# Patient Record
Sex: FEMALE | Race: WHITE | NOT HISPANIC OR LATINO
[De-identification: names, ages, dates, MRNs, and addresses within clinical notes are randomized per-mention and may not be internally consistent; named-entity substitution may affect disease eponyms.]

---

## 2018-06-21 ENCOUNTER — CHARTING TRANS (OUTPATIENT)
Dept: OTHER | Age: 48
End: 2018-06-21

## 2018-11-01 VITALS
HEIGHT: 69 IN | BODY MASS INDEX: 23.7 KG/M2 | OXYGEN SATURATION: 95 % | TEMPERATURE: 98.9 F | DIASTOLIC BLOOD PRESSURE: 80 MMHG | SYSTOLIC BLOOD PRESSURE: 124 MMHG | WEIGHT: 159.99 LBS | RESPIRATION RATE: 16 BRPM | HEART RATE: 83 BPM

## 2019-07-31 ENCOUNTER — OFFICE VISIT (OUTPATIENT)
Dept: FAMILY MEDICINE CLINIC | Facility: CLINIC | Age: 49
End: 2019-07-31
Payer: COMMERCIAL

## 2019-07-31 ENCOUNTER — TELEPHONE (OUTPATIENT)
Dept: FAMILY MEDICINE CLINIC | Facility: CLINIC | Age: 49
End: 2019-07-31

## 2019-07-31 VITALS
HEART RATE: 74 BPM | TEMPERATURE: 98 F | DIASTOLIC BLOOD PRESSURE: 90 MMHG | BODY MASS INDEX: 29.95 KG/M2 | HEIGHT: 67 IN | SYSTOLIC BLOOD PRESSURE: 146 MMHG | WEIGHT: 190.81 LBS

## 2019-07-31 DIAGNOSIS — F10.20 ALCOHOLISM (HCC): ICD-10-CM

## 2019-07-31 DIAGNOSIS — M79.641 RIGHT HAND PAIN: ICD-10-CM

## 2019-07-31 DIAGNOSIS — F32.A DEPRESSION, UNSPECIFIED DEPRESSION TYPE: ICD-10-CM

## 2019-07-31 DIAGNOSIS — G56.03 BILATERAL CARPAL TUNNEL SYNDROME: ICD-10-CM

## 2019-07-31 DIAGNOSIS — I10 ESSENTIAL HYPERTENSION: Primary | ICD-10-CM

## 2019-07-31 DIAGNOSIS — M65.332 TRIGGER MIDDLE FINGER OF LEFT HAND: ICD-10-CM

## 2019-07-31 PROCEDURE — 99203 OFFICE O/P NEW LOW 30 MIN: CPT | Performed by: FAMILY MEDICINE

## 2019-07-31 RX ORDER — METOPROLOL SUCCINATE 50 MG/1
50 TABLET, EXTENDED RELEASE ORAL DAILY
COMMUNITY
End: 2019-07-31

## 2019-07-31 RX ORDER — ALPRAZOLAM 0.5 MG/1
1 TABLET ORAL 3 TIMES DAILY PRN
Refills: 0 | COMMUNITY
Start: 2019-04-25 | End: 2019-07-31

## 2019-07-31 RX ORDER — ALPRAZOLAM 0.5 MG/1
0.5 TABLET ORAL 3 TIMES DAILY PRN
Qty: 30 TABLET | Refills: 0 | Status: SHIPPED
Start: 2019-07-31 | End: 2019-10-01

## 2019-07-31 RX ORDER — HYDROCHLOROTHIAZIDE 25 MG/1
25 TABLET ORAL DAILY
Qty: 90 TABLET | Refills: 0 | Status: SHIPPED | OUTPATIENT
Start: 2019-07-31 | End: 2019-10-10 | Stop reason: ALTCHOICE

## 2019-07-31 RX ORDER — ESCITALOPRAM OXALATE 10 MG/1
10 TABLET ORAL DAILY
Qty: 90 TABLET | Refills: 0 | Status: SHIPPED | OUTPATIENT
Start: 2019-07-31 | End: 2019-12-02

## 2019-07-31 RX ORDER — BUPROPION HYDROCHLORIDE 150 MG/1
150 TABLET ORAL DAILY
COMMUNITY
End: 2019-07-31

## 2019-07-31 RX ORDER — METOPROLOL SUCCINATE 50 MG/1
50 TABLET, EXTENDED RELEASE ORAL DAILY
Qty: 90 TABLET | Refills: 0 | Status: SHIPPED | OUTPATIENT
Start: 2019-07-31 | End: 2019-11-05

## 2019-07-31 RX ORDER — ESCITALOPRAM OXALATE 10 MG/1
10 TABLET ORAL DAILY
Refills: 0 | COMMUNITY
Start: 2019-03-22 | End: 2019-07-31

## 2019-07-31 RX ORDER — BUPROPION HYDROCHLORIDE 150 MG/1
150 TABLET ORAL DAILY
Qty: 90 TABLET | Refills: 0 | Status: SHIPPED | OUTPATIENT
Start: 2019-07-31 | End: 2019-11-07

## 2019-07-31 RX ORDER — HYDROCHLOROTHIAZIDE 25 MG/1
25 TABLET ORAL DAILY
COMMUNITY
End: 2019-07-31

## 2019-07-31 NOTE — PROGRESS NOTES
HPI: Melissa Grewal is a 46year old female who presents for establishment of care.  with 2 daughters. Due for physical. Got laid off. Diagnosed with HTN. Takes Metoprolol and HCTZ. She has been out of medication.      Followed with Orthopedics for trig medication. Refills given. Depression, unspecified depression type    Refills given. Advised to decrease alcohol use as it may interact with the medication. Trigger middle finger of left hand    Refer to Hand orthopedics.      Bilateral carpal mir

## 2019-10-01 NOTE — TELEPHONE ENCOUNTER
Review pended refill request as it does not fall under a protocol.     Last Rx: 07/31/2019 30  LOV: 07/31/2019

## 2019-10-02 RX ORDER — ALPRAZOLAM 0.5 MG/1
TABLET ORAL
Qty: 30 TABLET | Refills: 1 | Status: SHIPPED | OUTPATIENT
Start: 2019-10-02 | End: 2020-03-04

## 2019-10-10 ENCOUNTER — OFFICE VISIT (OUTPATIENT)
Dept: FAMILY MEDICINE CLINIC | Facility: CLINIC | Age: 49
End: 2019-10-10
Payer: COMMERCIAL

## 2019-10-10 ENCOUNTER — APPOINTMENT (OUTPATIENT)
Dept: LAB | Age: 49
End: 2019-10-10
Attending: FAMILY MEDICINE
Payer: COMMERCIAL

## 2019-10-10 VITALS
SYSTOLIC BLOOD PRESSURE: 100 MMHG | TEMPERATURE: 98 F | DIASTOLIC BLOOD PRESSURE: 66 MMHG | HEART RATE: 62 BPM | HEIGHT: 67.64 IN | BODY MASS INDEX: 28 KG/M2 | RESPIRATION RATE: 16 BRPM | WEIGHT: 182.63 LBS

## 2019-10-10 DIAGNOSIS — I10 ESSENTIAL HYPERTENSION: ICD-10-CM

## 2019-10-10 DIAGNOSIS — Z00.00 ROUTINE PHYSICAL EXAMINATION: ICD-10-CM

## 2019-10-10 DIAGNOSIS — Z12.39 SCREENING FOR BREAST CANCER: ICD-10-CM

## 2019-10-10 DIAGNOSIS — F10.21 ALCOHOL DEPENDENCE IN REMISSION (HCC): ICD-10-CM

## 2019-10-10 DIAGNOSIS — Z00.00 ROUTINE PHYSICAL EXAMINATION: Primary | ICD-10-CM

## 2019-10-10 PROCEDURE — 80053 COMPREHEN METABOLIC PANEL: CPT

## 2019-10-10 PROCEDURE — 90686 IIV4 VACC NO PRSV 0.5 ML IM: CPT | Performed by: FAMILY MEDICINE

## 2019-10-10 PROCEDURE — 82607 VITAMIN B-12: CPT

## 2019-10-10 PROCEDURE — 82746 ASSAY OF FOLIC ACID SERUM: CPT

## 2019-10-10 PROCEDURE — 80061 LIPID PANEL: CPT

## 2019-10-10 PROCEDURE — 84425 ASSAY OF VITAMIN B-1: CPT

## 2019-10-10 PROCEDURE — 84443 ASSAY THYROID STIM HORMONE: CPT

## 2019-10-10 PROCEDURE — 90471 IMMUNIZATION ADMIN: CPT | Performed by: FAMILY MEDICINE

## 2019-10-10 PROCEDURE — 82306 VITAMIN D 25 HYDROXY: CPT

## 2019-10-10 PROCEDURE — 36415 COLL VENOUS BLD VENIPUNCTURE: CPT

## 2019-10-10 PROCEDURE — 85027 COMPLETE CBC AUTOMATED: CPT

## 2019-10-10 PROCEDURE — 99396 PREV VISIT EST AGE 40-64: CPT | Performed by: FAMILY MEDICINE

## 2019-11-06 RX ORDER — METOPROLOL SUCCINATE 50 MG/1
TABLET, EXTENDED RELEASE ORAL
Qty: 90 TABLET | Refills: 1 | Status: SHIPPED | OUTPATIENT
Start: 2019-11-06 | End: 2020-08-03

## 2019-11-07 NOTE — TELEPHONE ENCOUNTER
Refill passed per [facility], [facility] protocol.   Hypertensive Medications  Protocol Criteria:  · Appointment scheduled in the past 6 months or in the next 3 months  · BMP or CMP in the past 12 months  · Creatinine result < 2  Recent Outpatient Visits

## 2019-11-08 RX ORDER — BUPROPION HYDROCHLORIDE 150 MG/1
150 TABLET ORAL DAILY
Qty: 90 TABLET | Refills: 0 | OUTPATIENT
Start: 2019-11-08

## 2019-11-08 RX ORDER — BUPROPION HYDROCHLORIDE 150 MG/1
TABLET ORAL
Qty: 90 TABLET | Refills: 1 | Status: SHIPPED | OUTPATIENT
Start: 2019-11-08 | End: 2020-04-13

## 2019-11-08 NOTE — TELEPHONE ENCOUNTER
Refill passed per [facility], [facility] protocol.   Refill Protocol Appointment Criteria  · Appointment scheduled in the past 6 months or in the next 3 months  Recent Outpatient Visits            4 weeks ago Routine physical examination    [facility]

## 2019-12-03 RX ORDER — ESCITALOPRAM OXALATE 10 MG/1
TABLET ORAL
Qty: 90 TABLET | Refills: 1 | Status: SHIPPED | OUTPATIENT
Start: 2019-12-03 | End: 2020-08-03

## 2019-12-04 RX ORDER — ESCITALOPRAM OXALATE 10 MG/1
10 TABLET ORAL DAILY
Qty: 90 TABLET | Refills: 0 | OUTPATIENT
Start: 2019-12-04

## 2019-12-04 NOTE — TELEPHONE ENCOUNTER
Sent to [facility] out-of-office    Review pended refill request as it does not fall under a protocol.     Last Rx: 7/31/19 #30#0  LOV: 10/10/19

## 2019-12-19 ENCOUNTER — OFFICE VISIT (OUTPATIENT)
Dept: FAMILY MEDICINE CLINIC | Facility: CLINIC | Age: 49
End: 2019-12-19
Payer: COMMERCIAL

## 2019-12-19 VITALS
HEART RATE: 56 BPM | DIASTOLIC BLOOD PRESSURE: 74 MMHG | TEMPERATURE: 98 F | BODY MASS INDEX: 29 KG/M2 | WEIGHT: 186 LBS | SYSTOLIC BLOOD PRESSURE: 111 MMHG | RESPIRATION RATE: 16 BRPM

## 2019-12-19 DIAGNOSIS — I10 ESSENTIAL HYPERTENSION: Primary | ICD-10-CM

## 2019-12-19 PROCEDURE — 99213 OFFICE O/P EST LOW 20 MIN: CPT | Performed by: FAMILY MEDICINE

## 2019-12-19 NOTE — PROGRESS NOTES
HPI: Jeri Bacon is a 44year old female who presents for follow-up. She is still getting some dizzy spells. Checking BP at home and readings have been low- systolic under 445. Eliminated HCTZ at last visit. Denies chest pain, SOB, palpitations, edema.

## 2020-03-04 RX ORDER — ALPRAZOLAM 0.5 MG/1
TABLET ORAL
Qty: 30 TABLET | Refills: 0 | Status: SHIPPED | OUTPATIENT
Start: 2020-03-04 | End: 2020-06-24

## 2020-03-04 NOTE — TELEPHONE ENCOUNTER
· Please advise on refill. Thanks.   Recent Outpatient Visits            2 months ago Essential hypertension    [facility], [facility], [address] , [place], [place]    Office Visit    4 months ago Routine physical examination    [address]

## 2020-04-13 RX ORDER — BUPROPION HYDROCHLORIDE 150 MG/1
TABLET ORAL
Qty: 30 TABLET | Refills: 0 | Status: SHIPPED | OUTPATIENT
Start: 2020-04-13 | End: 2020-06-03

## 2020-06-03 RX ORDER — BUPROPION HYDROCHLORIDE 150 MG/1
TABLET ORAL
Qty: 30 TABLET | Refills: 0 | Status: SHIPPED | OUTPATIENT
Start: 2020-06-03 | End: 2020-06-24

## 2020-06-24 RX ORDER — ALPRAZOLAM 0.5 MG/1
TABLET ORAL
Qty: 30 TABLET | Refills: 0 | Status: SHIPPED | OUTPATIENT
Start: 2020-06-24 | End: 2020-08-03

## 2020-06-24 RX ORDER — BUPROPION HYDROCHLORIDE 150 MG/1
TABLET ORAL
Qty: 30 TABLET | Refills: 0 | Status: SHIPPED | OUTPATIENT
Start: 2020-06-24 | End: 2020-08-03

## 2020-08-03 RX ORDER — ESCITALOPRAM OXALATE 10 MG/1
10 TABLET ORAL DAILY
Qty: 90 TABLET | Refills: 1 | Status: CANCELLED | OUTPATIENT
Start: 2020-08-03

## 2020-08-03 NOTE — TELEPHONE ENCOUNTER
Ladonna with [employer] called and advised she is looking for refills for the patient for the Alprazolam & the Bupropion, Patient is also asking about the Escitaloprm refill. They are looking for updates on these medications. Please Advise.

## 2020-08-06 RX ORDER — ALPRAZOLAM 0.5 MG/1
0.5 TABLET ORAL 3 TIMES DAILY PRN
Qty: 30 TABLET | Refills: 0 | Status: SHIPPED | OUTPATIENT
Start: 2020-08-06 | End: 2020-09-17

## 2020-08-06 RX ORDER — BUPROPION HYDROCHLORIDE 150 MG/1
150 TABLET ORAL DAILY
Qty: 30 TABLET | Refills: 0 | Status: SHIPPED | OUTPATIENT
Start: 2020-08-06 | End: 2020-09-17 | Stop reason: ALTCHOICE

## 2020-09-03 RX ORDER — BUPROPION HYDROCHLORIDE 150 MG/1
TABLET ORAL
Qty: 30 TABLET | Refills: 0 | Status: SHIPPED | OUTPATIENT
Start: 2020-09-03 | End: 2020-11-10

## 2020-09-03 RX ORDER — ALPRAZOLAM 0.5 MG/1
TABLET ORAL
Qty: 30 TABLET | Refills: 0 | Status: SHIPPED | OUTPATIENT
Start: 2020-09-03 | End: 2020-09-17 | Stop reason: CLARIF

## 2020-09-17 ENCOUNTER — OFFICE VISIT (OUTPATIENT)
Dept: FAMILY MEDICINE CLINIC | Facility: CLINIC | Age: 50
End: 2020-09-17
Payer: COMMERCIAL

## 2020-09-17 VITALS
TEMPERATURE: 97 F | DIASTOLIC BLOOD PRESSURE: 70 MMHG | BODY MASS INDEX: 28.7 KG/M2 | HEIGHT: 67.64 IN | OXYGEN SATURATION: 97 % | SYSTOLIC BLOOD PRESSURE: 110 MMHG | HEART RATE: 69 BPM | RESPIRATION RATE: 18 BRPM | WEIGHT: 187.19 LBS

## 2020-09-17 DIAGNOSIS — Z13.21 SCREENING FOR ENDOCRINE, NUTRITIONAL, METABOLIC AND IMMUNITY DISORDER: ICD-10-CM

## 2020-09-17 DIAGNOSIS — K63.5 POLYP OF COLON, UNSPECIFIED PART OF COLON, UNSPECIFIED TYPE: ICD-10-CM

## 2020-09-17 DIAGNOSIS — F41.9 ANXIETY: ICD-10-CM

## 2020-09-17 DIAGNOSIS — Z00.00 ANNUAL PHYSICAL EXAM: Primary | ICD-10-CM

## 2020-09-17 DIAGNOSIS — F51.04 PSYCHOPHYSIOLOGICAL INSOMNIA: ICD-10-CM

## 2020-09-17 DIAGNOSIS — Z12.4 SCREENING FOR CERVICAL CANCER: ICD-10-CM

## 2020-09-17 DIAGNOSIS — Z13.0 SCREENING FOR IRON DEFICIENCY ANEMIA: ICD-10-CM

## 2020-09-17 DIAGNOSIS — Z13.29 SCREENING FOR ENDOCRINE, NUTRITIONAL, METABOLIC AND IMMUNITY DISORDER: ICD-10-CM

## 2020-09-17 DIAGNOSIS — Z13.6 SCREENING FOR HEART DISEASE: ICD-10-CM

## 2020-09-17 DIAGNOSIS — Z12.31 ENCOUNTER FOR SCREENING MAMMOGRAM FOR MALIGNANT NEOPLASM OF BREAST: ICD-10-CM

## 2020-09-17 DIAGNOSIS — Z13.0 SCREENING FOR ENDOCRINE, NUTRITIONAL, METABOLIC AND IMMUNITY DISORDER: ICD-10-CM

## 2020-09-17 DIAGNOSIS — Z12.11 SCREEN FOR COLON CANCER: ICD-10-CM

## 2020-09-17 DIAGNOSIS — Z78.0 MENOPAUSE: ICD-10-CM

## 2020-09-17 DIAGNOSIS — Z13.228 SCREENING FOR ENDOCRINE, NUTRITIONAL, METABOLIC AND IMMUNITY DISORDER: ICD-10-CM

## 2020-09-17 DIAGNOSIS — Z23 NEED FOR VACCINATION: ICD-10-CM

## 2020-09-17 PROBLEM — L02.93 RECURRENT BOILS: Status: ACTIVE | Noted: 2018-02-14

## 2020-09-17 PROBLEM — M19.049 HAND ARTHRITIS: Status: ACTIVE | Noted: 2018-07-25

## 2020-09-17 PROBLEM — E66.3 OVERWEIGHT WITH BODY MASS INDEX (BMI) 25.0-29.9: Status: ACTIVE | Noted: 2017-02-27

## 2020-09-17 PROBLEM — M67.449 MUCOUS CYST OF FINGER: Status: ACTIVE | Noted: 2018-07-25

## 2020-09-17 PROBLEM — G56.03 BILATERAL CARPAL TUNNEL SYNDROME: Status: ACTIVE | Noted: 2018-07-25

## 2020-09-17 PROBLEM — L02.92 RECURRENT BOILS: Status: ACTIVE | Noted: 2018-02-14

## 2020-09-17 LAB
ALBUMIN SERPL-MCNC: 4.1 G/DL (ref 3.4–5)
ALBUMIN/GLOB SERPL: 1.1 {RATIO} (ref 1–2)
ALP LIVER SERPL-CCNC: 102 U/L (ref 41–108)
ALT SERPL-CCNC: 34 U/L (ref 13–56)
ANION GAP SERPL CALC-SCNC: 4 MMOL/L (ref 0–18)
AST SERPL-CCNC: 19 U/L (ref 15–37)
BASOPHILS # BLD AUTO: 0.04 X10(3) UL (ref 0–0.2)
BASOPHILS NFR BLD AUTO: 0.5 %
BILIRUB SERPL-MCNC: 0.4 MG/DL (ref 0.1–2)
BUN BLD-MCNC: 16 MG/DL (ref 7–18)
BUN/CREAT SERPL: 23.9 (ref 10–20)
CALCIUM BLD-MCNC: 10 MG/DL (ref 8.5–10.1)
CHLORIDE SERPL-SCNC: 107 MMOL/L (ref 98–112)
CHOLEST SMN-MCNC: 275 MG/DL (ref ?–200)
CO2 SERPL-SCNC: 27 MMOL/L (ref 21–32)
CREAT BLD-MCNC: 0.67 MG/DL (ref 0.55–1.02)
DEPRECATED RDW RBC AUTO: 42.5 FL (ref 35.1–46.3)
EOSINOPHIL # BLD AUTO: 0.09 X10(3) UL (ref 0–0.7)
EOSINOPHIL NFR BLD AUTO: 1 %
ERYTHROCYTE [DISTWIDTH] IN BLOOD BY AUTOMATED COUNT: 12.3 % (ref 11–15)
GLOBULIN PLAS-MCNC: 3.7 G/DL (ref 2.8–4.4)
GLUCOSE BLD-MCNC: 95 MG/DL (ref 70–99)
HCT VFR BLD AUTO: 45.1 % (ref 35–48)
HDLC SERPL-MCNC: 53 MG/DL (ref 40–59)
HGB BLD-MCNC: 14.2 G/DL (ref 12–16)
IMM GRANULOCYTES # BLD AUTO: 0.04 X10(3) UL (ref 0–1)
IMM GRANULOCYTES NFR BLD: 0.5 %
LDLC SERPL CALC-MCNC: 204 MG/DL (ref ?–100)
LYMPHOCYTES # BLD AUTO: 2.7 X10(3) UL (ref 1–4)
LYMPHOCYTES NFR BLD AUTO: 31.1 %
M PROTEIN MFR SERPL ELPH: 7.8 G/DL (ref 6.4–8.2)
MCH RBC QN AUTO: 29.2 PG (ref 26–34)
MCHC RBC AUTO-ENTMCNC: 31.5 G/DL (ref 31–37)
MCV RBC AUTO: 92.8 FL (ref 80–100)
MONOCYTES # BLD AUTO: 0.62 X10(3) UL (ref 0.1–1)
MONOCYTES NFR BLD AUTO: 7.2 %
NEUTROPHILS # BLD AUTO: 5.18 X10 (3) UL (ref 1.5–7.7)
NEUTROPHILS # BLD AUTO: 5.18 X10(3) UL (ref 1.5–7.7)
NEUTROPHILS NFR BLD AUTO: 59.7 %
NONHDLC SERPL-MCNC: 222 MG/DL (ref ?–130)
OSMOLALITY SERPL CALC.SUM OF ELEC: 287 MOSM/KG (ref 275–295)
PATIENT FASTING Y/N/NP: YES
PATIENT FASTING Y/N/NP: YES
PLATELET # BLD AUTO: 260 10(3)UL (ref 150–450)
POTASSIUM SERPL-SCNC: 4.7 MMOL/L (ref 3.5–5.1)
RBC # BLD AUTO: 4.86 X10(6)UL (ref 3.8–5.3)
SODIUM SERPL-SCNC: 138 MMOL/L (ref 136–145)
TRIGL SERPL-MCNC: 88 MG/DL (ref 30–149)
TSI SER-ACNC: 2.73 MIU/ML (ref 0.36–3.74)
VLDLC SERPL CALC-MCNC: 18 MG/DL (ref 0–30)
WBC # BLD AUTO: 8.7 X10(3) UL (ref 4–11)

## 2020-09-17 PROCEDURE — 3074F SYST BP LT 130 MM HG: CPT | Performed by: FAMILY MEDICINE

## 2020-09-17 PROCEDURE — 80050 GENERAL HEALTH PANEL: CPT | Performed by: FAMILY MEDICINE

## 2020-09-17 PROCEDURE — 87624 HPV HI-RISK TYP POOLED RSLT: CPT | Performed by: FAMILY MEDICINE

## 2020-09-17 PROCEDURE — 99386 PREV VISIT NEW AGE 40-64: CPT | Performed by: FAMILY MEDICINE

## 2020-09-17 PROCEDURE — 88175 CYTOPATH C/V AUTO FLUID REDO: CPT | Performed by: FAMILY MEDICINE

## 2020-09-17 PROCEDURE — 90686 IIV4 VACC NO PRSV 0.5 ML IM: CPT | Performed by: FAMILY MEDICINE

## 2020-09-17 PROCEDURE — 90732 PPSV23 VACC 2 YRS+ SUBQ/IM: CPT | Performed by: FAMILY MEDICINE

## 2020-09-17 PROCEDURE — 90471 IMMUNIZATION ADMIN: CPT | Performed by: FAMILY MEDICINE

## 2020-09-17 PROCEDURE — 36415 COLL VENOUS BLD VENIPUNCTURE: CPT | Performed by: FAMILY MEDICINE

## 2020-09-17 PROCEDURE — 80061 LIPID PANEL: CPT | Performed by: FAMILY MEDICINE

## 2020-09-17 PROCEDURE — 3008F BODY MASS INDEX DOCD: CPT | Performed by: FAMILY MEDICINE

## 2020-09-17 PROCEDURE — 3078F DIAST BP <80 MM HG: CPT | Performed by: FAMILY MEDICINE

## 2020-09-17 RX ORDER — TRAZODONE HYDROCHLORIDE 50 MG/1
50 TABLET ORAL NIGHTLY
Qty: 30 TABLET | Refills: 1 | Status: SHIPPED | OUTPATIENT
Start: 2020-09-17 | End: 2020-09-22 | Stop reason: SINTOL

## 2020-09-17 NOTE — PATIENT INSTRUCTIONS
Perform labs fasting 8 hours with water or black coffee or or black tea diet  soda only prior to exam.    -Encourage healthy diet of whole food and avoid processed food and sugary drinks and sodas.   Diet should include lean meats and vegetables including 5 Screening Who needs it How often   Type 2 diabetes or prediabetes All women beginning at age 39 and women without symptoms at any age who are overweight or obese and have 1 or more additional risk factors for diabetes.  At  least every 3 years   Type 2 diab Some people should be screened using a different schedule because of their personal or family health history. Talk with your healthcare provider about your health history.    Depression All women in this age group At routine exams   Gonorrhea Sexually activ Measles, mumps, rubella (MMR) Women in this age group through their late 46s who have no record of these infections or vaccines 1 dose   Meningococcal Women at increased risk for infection – talk with your healthcare provider 1 or more doses   Pneumococcal Colorectal Cancer Screening    Colorectal cancer is cancer in the colon or rectum. It's a leading cause of cancer deaths in the [place] But when this cancer is found and removed early, the chances of a full recovery are very good.  Because colorectal cancer ra Polyps are growths that form on the inner lining of the colon or rectum. Most are benign, which means they aren’t cancer. But over time, some polyps can become cancer (malignant). This happens when cells in these polyps start growing abnormally.  In time, milton If you have a family history of colon cancer or are at high risk for other reasons, you may need to have screening even earlier. Talk with your provider to find out about your risk factors. Screening advice varies among expert groups.  It's important to al Colonoscopy is the only screening test that lets your healthcare provider see the entire colon and rectum. This test also lets your healthcare provider remove any pieces of tissue that need to be looked at by a lab.  If something suspicious is found using a Talk with your healthcare provider about which tests might be right for you. No matter which test you choose, the most important thing is that you get screened.  Keep in mind that if cancer is found at an early stage during screening, treatment is more like Perimenopause is sometimes called the menopause transition. It happens in the months or years before menopause. It may begin when you reach your mid-40s. During this time, your estrogen levels go up and down and then decrease.  As a result, you may notice s © 6465-0399 [employer] [address]. [address] [facility], [address] [place]. All rights reserved. This information is not intended as a substitute for professional medical care. Always follow your healthcare professional's instructions.         Hormone A woman goes through many stages during her lifetime. These stages are a natural part of being a woman. Physical and emotional changes take place during the menstrual cycle, pregnancy, motherhood, and menopause.  These changes can affect sleep, even cause i · Many new mothers feel a little down for a few weeks. Share your feelings with your loved ones. Talk to your healthcare provider if your feelings get in the way of sleeping or eating. · Try to adjust your baby’s sleep to fit a day-night cycle.  At night, You may wonder how you can improve the health of your heart. If you’re thinking about exercise, you’re on the right track. You don’t need to become an athlete, but you do need a certain amount of brisk exercise to help strengthen your heart.  If you have be Choose one or more activities you enjoy. Walking is one of the easiest things you can do. You can also try swimming, riding a bike, dancing, or taking an exercise class.   Stop exercising and call your doctor if you:  · Have chest pain or feel dizzy or ligh · Reduce sodium (salt) intake. Eating too much salt may increase your blood pressure. Limit your sodium intake to 2,300 milligrams (mg) per day (the amount in 1 teaspoon of salt), or less if your healthcare provider recommends it.  Dining out less often and Healthy eating starts at the grocery store. Be sure to pay attention to food labels on packaged foods. Look for products that are high in fiber and protein, and low in saturated fat, cholesterol, and sodium. Avoid products that contain trans fat.  And pay c During young adulthood, bones become their strongest. This is called peak bone mass. The same good habits that kept bones healthy in childhood help keep bones healthy in adulthood. Age 27 to menopause  Bone mass declines slightly during these years.  Your Your body needs calcium to build and repair bones. But it can't make calcium on its own. That's why it's important to eat calcium-rich foods. Some foods are naturally rich in calcium. Others have calcium added (fortified).  It's best to get calcium from the 25-hydroxyvitamin D (25-high-DROX-ee-VIE-tuh-min D), 25(OH)D  What is this test?  Vitamin D is mainly found in fortified dairy foods, juice, breakfast cereal, and certain fish. This vitamin plays many roles in the body.  But because it helps the body absorb Test results may vary depending on your age, gender, health history, the method used for the test, and other things. Your test results may not mean you have a problem. Ask your healthcare provider what your test results mean for you.    Children and adults © 3795-8418 [employer] [address]. [address] [facility], [address] [place]. All rights reserved. This information is not intended as a substitute for professional medical care. Always follow your healthcare professional's instructions.           Preve If you have osteoporosis, exercise is vital for your health. It can prevent bone fractures and spine changes. It will slow bone loss. Exercise will strengthen your body. It can also be fun.  A variety of exercises is best. See below for exercises that can h

## 2020-09-17 NOTE — PROGRESS NOTES
REASON FOR VISIT:    Tracy Rosen is a 44year old female who [facility] History of anxiety/depression-worse with perimenopause/menopause in the past 2 years.   On escitalopram 10 mg and bupropion b • MAGNESIUM GLYCINATE OR Take 425 mg by mouth nightly. • traZODone HCl 50 MG Oral Tab Take 1 tablet (50 mg total) by mouth nightly.  30 tablet 1   • BUPROPION HCL ER, XL, 150 MG Oral Tablet 24 Hr TAKE ONE TABLET BY MOUTH ONE TIME DAILY  30 tablet 0   • Chlamydia Screening Screen Annually age<25, if sex active/on OCPs; >24 high risk No results found for: CHLAMYDIA    Colonoscopy Screen Every 10 years Colonoscopy due on 12/04/2018    Flex Sigmoidoscopy Screen  Every 5 years No results found for this or any LDL  Annually LDL Cholesterol (mg/dL)   Date Value   09/17/2020 204 (H)    No flowsheet data found. Dilated Eye exam  Annually No flowsheet data found. No flowsheet data found.     Asthma  (Annually between Nov. 1 & Dec. 31)    Date of last AAP/ACT an GI: denies abdominal pain, denies heartburn  : denies dysuria, vaginal discharge or itching.   menopausal, denies vaginal bleeding  MUSCULOSKELETAL: denies back pain  NEURO: Brain fog denies headaches  PSYCHE: Insomnia, fatigue denies depression or anxiet - TSH W REFLEX TO FREE T4; Future  - THINPREP PAP SMEAR B; Future  - HPV HIGH RISK , THIN PREP COLLECTION; Future  - [facility] LUAN 2D+3D SCREENING BILAT (CPT=77067/38525);  Future  - GASTRO - INTERNAL  - PNEUMOCOCCAL IMM (PNEUMOVAX)  - FLULAVAL INFLUENZA VACCINE Suspect anxiety and depression worsening due to loss of estrogen    5. Screen for colon cancer  6. Polyp of colon, unspecified part of colon, unspecified type  - GASTRO - INTERNAL  Schedule for repeat colonoscopy    7.  Need for vaccination  - PNEUMOCOCCAL

## 2020-09-17 NOTE — PROGRESS NOTES
Patient came in for draw of ordered fasting labs. Patient drawn out of left  AC, x 1 attempt and tolerated well. 1 lt grn 1 lav tube drawn.

## 2020-09-18 ENCOUNTER — PATIENT MESSAGE (OUTPATIENT)
Dept: FAMILY MEDICINE CLINIC | Facility: CLINIC | Age: 50
End: 2020-09-18

## 2020-09-18 ENCOUNTER — TELEPHONE (OUTPATIENT)
Dept: FAMILY MEDICINE CLINIC | Facility: CLINIC | Age: 50
End: 2020-09-18

## 2020-09-18 LAB — HPV I/H RISK 1 DNA SPEC QL NAA+PROBE: NEGATIVE

## 2020-09-18 NOTE — TELEPHONE ENCOUNTER
Rec'd flu shot ( left arm ) and pneumonia( left arm) shot 9/17/2020  New medication trazodone started 9/17/2020    Left arm and red and swollen and sensitive to touch yesterday  Today it is just is sore and a little red and warm and sensitive to touch

## 2020-09-18 NOTE — TELEPHONE ENCOUNTER
Patient called and said that she is having a reaction to the shots that she got yesterday or the medication is not sure.

## 2020-09-18 NOTE — TELEPHONE ENCOUNTER
Pneumonia vaccine-about 10% of patients can get of reaction at the injection site and typically its its redness and itching which resolves within a few days.   As long as there is no pus or drainage or the entire arm is not red just an area of a few centime

## 2020-09-18 NOTE — TELEPHONE ENCOUNTER
Patient states dizzy, nauseas and mild headache, body aches, but denies any symptoms of anaphylaxis. Agrees to go to emergency room if she is short of breath, feels like passing out or having chest pain, severe headache, wheezing or feeling worse today.

## 2020-09-21 NOTE — TELEPHONE ENCOUNTER
From: Dina Farrar  To: Nadia Beard DO  Sent: 9/18/2020 2:16 PM CDT  Subject: Visit Follow-up Question    Jarrett Varghese. I seem to be having a reaction to the injections yesterday.  Dizziness, nauseous, low fever, cold sweats/chills, body aches, the

## 2020-09-22 PROBLEM — F99 INSOMNIA DUE TO OTHER MENTAL DISORDER: Status: ACTIVE | Noted: 2020-09-22

## 2020-09-22 PROBLEM — F51.05 INSOMNIA DUE TO OTHER MENTAL DISORDER: Status: ACTIVE | Noted: 2020-09-22

## 2020-09-22 PROBLEM — R22.41 HIP MASS, RIGHT: Status: ACTIVE | Noted: 2020-09-22

## 2020-09-22 NOTE — PROGRESS NOTES
Due to COVID-19 ACTION PLAN, the patient's office visit was converted to a phone or video visit.   Time Spent: 31 min    Subjective     HPI:   Alexandre Alcantar is a 44year old female who presents for follow up side effect to medication- 30 minutes of • Sodium 09/17/2020 138    • Potassium 09/17/2020 4.7    • Chloride 09/17/2020 107    • CO2 09/17/2020 27.0    • Anion Gap 09/17/2020 4    • BUN 09/17/2020 16    • Creatinine 09/17/2020 0.67    • BUN/CREA Ratio 09/17/2020 23.9*   • Calcium, Total 09/17/2 tablet (20 mg total) by mouth daily.  -Risks and benefits of medication discussed including headache, bloating, constipation, diarrhea, weight gain, developing depression, severe depression, dizziness, not feeling like oneself.   Black box warning of becomi crisis/national emergency and because of restrictions of visitation. There are limitations of this visit as no physical exam could be performed. Every conscious effort was taken to allow for sufficient and adequate time.   This billing visit was spent on

## 2020-09-25 NOTE — PROGRESS NOTES
Called and spoke with pt. Informed her of lab and pap results. She states she is due for an appt in 1 month from her last visit and will address cholesterol medication at that visit.

## 2020-10-02 ENCOUNTER — TELEPHONE (OUTPATIENT)
Dept: FAMILY MEDICINE CLINIC | Facility: CLINIC | Age: 50
End: 2020-10-02

## 2020-10-02 ENCOUNTER — OFFICE VISIT (OUTPATIENT)
Dept: FAMILY MEDICINE CLINIC | Facility: CLINIC | Age: 50
End: 2020-10-02
Payer: COMMERCIAL

## 2020-10-02 VITALS
SYSTOLIC BLOOD PRESSURE: 104 MMHG | TEMPERATURE: 97 F | OXYGEN SATURATION: 98 % | HEIGHT: 67.7 IN | WEIGHT: 187.38 LBS | DIASTOLIC BLOOD PRESSURE: 66 MMHG | BODY MASS INDEX: 28.73 KG/M2 | RESPIRATION RATE: 18 BRPM | HEART RATE: 68 BPM

## 2020-10-02 DIAGNOSIS — F51.05 INSOMNIA DUE TO OTHER MENTAL DISORDER: ICD-10-CM

## 2020-10-02 DIAGNOSIS — R06.83 SNORING: ICD-10-CM

## 2020-10-02 DIAGNOSIS — R25.8 NOCTURNAL LEG MOVEMENTS: ICD-10-CM

## 2020-10-02 DIAGNOSIS — D17.1 LIPOMA OF TORSO: ICD-10-CM

## 2020-10-02 DIAGNOSIS — G47.00 FREQUENT NOCTURNAL AWAKENING: Primary | ICD-10-CM

## 2020-10-02 DIAGNOSIS — F51.04 PSYCHOPHYSIOLOGICAL INSOMNIA: ICD-10-CM

## 2020-10-02 DIAGNOSIS — F99 INSOMNIA DUE TO OTHER MENTAL DISORDER: ICD-10-CM

## 2020-10-02 PROCEDURE — 3074F SYST BP LT 130 MM HG: CPT | Performed by: FAMILY MEDICINE

## 2020-10-02 PROCEDURE — 3078F DIAST BP <80 MM HG: CPT | Performed by: FAMILY MEDICINE

## 2020-10-02 PROCEDURE — 99214 OFFICE O/P EST MOD 30 MIN: CPT | Performed by: FAMILY MEDICINE

## 2020-10-02 PROCEDURE — 3008F BODY MASS INDEX DOCD: CPT | Performed by: FAMILY MEDICINE

## 2020-10-02 RX ORDER — GABAPENTIN 300 MG/1
300 CAPSULE ORAL NIGHTLY
Qty: 30 CAPSULE | Refills: 1 | Status: SHIPPED | OUTPATIENT
Start: 2020-10-02 | End: 2020-11-30

## 2020-10-02 NOTE — PATIENT INSTRUCTIONS
As of October 6th 2014, the Drug Enforcement Agency [facility]) is reclassifying all hydrocodone combination medications from Schedule III to Schedule II. This includes medications such as Norco, Vicodin, Lortab, Zohydro, and Vicoprofen.      What this means for prompts. [phone]  Please call radiology in the next couple of days and an order for ultrasound of soft tissue on the abdomen will be placed.

## 2020-10-02 NOTE — TELEPHONE ENCOUNTER
Patient called stating she tried to make an appt with the sleep study dr, but the staff at that office said the referral was put in incorrectly. She said they told her to have us put the referral in under \"pulmonary\" instead of \"sleep study. \"

## 2020-10-02 NOTE — PROGRESS NOTES
CHIEF COMPLAINT: Patient presents with:  Lump: Rt hip, increasing in size, Denies any pain     HPI:     Hawkinsedilson Shipley is a 44year old female presents for right hip mass -  noticed 9/19/20- non tender, movable-feels it has become larger sinc Yes      Frequency: 4 or more times a week      Drinks per session: 5 or 6      Binge frequency: Less than monthly    Drug use: Never       Medications (Active prior to today's visit):  Current Outpatient Medications   Medication Sig Dispense Refill   • es bilaterally. Skin: no acute rashes, right lower abdomen upper hip 7 cm movable soft well-circumscribed mass, skin is pink and normal color no redness with palpation. Neuro: AOx3.   Normal gait  PSYCHE:mild fatigue, fatigued otherwise normal affect, very side effects or complications from the treatments as a result of today.      Problem List:   Patient Active Problem List:     Generalized anxiety disorder     Bilateral carpal tunnel syndrome     Essential hypertension, benign     Hand arthritis     Health

## 2020-10-04 NOTE — TELEPHONE ENCOUNTER
Patient is to complete the sleep study if it is positive she may need to see the sleep  that would be determined based on the results. Please direct her to call central scheduling at [phone] to schedule sleep study.   Thank you this w

## 2020-10-05 NOTE — TELEPHONE ENCOUNTER
LMOM to return call to the office. Provided pt office phone [phone] along with office hours given.     Detailed message left for patient to call  to schedule sleep study first and then if needed she would be referred to Pulmonology

## 2020-10-15 ENCOUNTER — OFFICE VISIT (OUTPATIENT)
Dept: SURGERY | Facility: CLINIC | Age: 50
End: 2020-10-15
Payer: COMMERCIAL

## 2020-10-15 VITALS — BODY MASS INDEX: 29.35 KG/M2 | TEMPERATURE: 97 F | HEIGHT: 67 IN | WEIGHT: 187 LBS

## 2020-10-15 DIAGNOSIS — D17.79 LIPOMA OF OTHER SPECIFIED SITES: Primary | ICD-10-CM

## 2020-10-15 PROCEDURE — 3008F BODY MASS INDEX DOCD: CPT | Performed by: SURGERY

## 2020-10-15 PROCEDURE — 99202 OFFICE O/P NEW SF 15 MIN: CPT | Performed by: SURGERY

## 2020-10-15 RX ORDER — ACETAMINOPHEN 500 MG
1000 TABLET ORAL ONCE
Status: CANCELLED | OUTPATIENT
Start: 2020-10-15 | End: 2020-10-15

## 2020-10-15 RX ORDER — CYANOCOBALAMIN (VITAMIN B-12) 5000 MCG
1 TABLET,DISINTEGRATING ORAL DAILY
COMMUNITY
End: 2021-11-29

## 2020-10-15 RX ORDER — IBUPROFEN 200 MG
200 TABLET ORAL EVERY 6 HOURS PRN
COMMUNITY
End: 2021-12-02

## 2020-10-15 RX ORDER — ACETAMINOPHEN 500 MG
1000 TABLET ORAL EVERY 6 HOURS PRN
COMMUNITY
End: 2021-02-01 | Stop reason: ALTCHOICE

## 2020-10-15 RX ORDER — CHOLECALCIFEROL (VITAMIN D3) 125 MCG
1 CAPSULE ORAL DAILY
COMMUNITY
End: 2021-11-29

## 2020-10-16 ENCOUNTER — APPOINTMENT (OUTPATIENT)
Dept: LAB | Facility: HOSPITAL | Age: 50
End: 2020-10-16
Attending: SURGERY
Payer: COMMERCIAL

## 2020-10-16 DIAGNOSIS — D17.9 LIPOMA: ICD-10-CM

## 2020-10-16 NOTE — H&P (VIEW-ONLY)
New Patient Visit Note       Active Problems      1. Lipoma of other specified sites        Chief Complaint   Patient presents with:  Lump: lipoma right hip consult -- States discomfort. States notices more when laying down for a long period of time.  Aj Barron •  Cyanocobalamin ([employer] VITAMIN B-12) 5000 MCG Sublingual SL Tab, Place 1 tablet under the tongue daily. , Disp: , Rfl:   •  Lactobacillus (PROBIOTIC ACIDOPHILUS OR), Take 1 tablet by mouth daily. , Disp: , Rfl:   •  ibuprofen 200 MG Oral Tab, Take 200 mg by Abdominal: Soft. Bowel sounds are normal. She exhibits no distension. There is no abdominal tenderness. Musculoskeletal: Normal range of motion. General: No deformity. Neurological: She is alert and oriented to person, place, and time.    Skin:

## 2020-10-16 NOTE — H&P
New Patient Visit Note       Active Problems      1. Lipoma of other specified sites        Chief Complaint   Patient presents with:  Lump: lipoma right hip consult -- States discomfort. States notices more when laying down for a long period of time.  Louretta Ring •  Cyanocobalamin ([employer] VITAMIN B-12) 5000 MCG Sublingual SL Tab, Place 1 tablet under the tongue daily. , Disp: , Rfl:   •  Lactobacillus (PROBIOTIC ACIDOPHILUS OR), Take 1 tablet by mouth daily. , Disp: , Rfl:   •  ibuprofen 200 MG Oral Tab, Take 200 mg by Abdominal: Soft. Bowel sounds are normal. She exhibits no distension. There is no abdominal tenderness. Musculoskeletal: Normal range of motion. General: No deformity. Neurological: She is alert and oriented to person, place, and time.    Skin:

## 2020-10-19 ENCOUNTER — HOSPITAL ENCOUNTER (OUTPATIENT)
Facility: HOSPITAL | Age: 50
Setting detail: HOSPITAL OUTPATIENT SURGERY
Discharge: HOME | End: 2020-10-19
Attending: SURGERY | Admitting: SURGERY
Payer: COMMERCIAL

## 2020-10-19 ENCOUNTER — ANESTHESIA (OUTPATIENT)
Dept: SURGERY | Facility: HOSPITAL | Age: 50
End: 2020-10-19
Payer: COMMERCIAL

## 2020-10-19 ENCOUNTER — ANESTHESIA EVENT (OUTPATIENT)
Dept: SURGERY | Facility: HOSPITAL | Age: 50
End: 2020-10-19
Payer: COMMERCIAL

## 2020-10-19 VITALS
TEMPERATURE: 97 F | WEIGHT: 187.19 LBS | BODY MASS INDEX: 29.38 KG/M2 | DIASTOLIC BLOOD PRESSURE: 59 MMHG | HEART RATE: 63 BPM | SYSTOLIC BLOOD PRESSURE: 101 MMHG | OXYGEN SATURATION: 97 % | RESPIRATION RATE: 18 BRPM | HEIGHT: 67 IN

## 2020-10-19 DIAGNOSIS — D17.9 LIPOMA: Primary | ICD-10-CM

## 2020-10-19 DIAGNOSIS — D17.79 LIPOMA OF OTHER SPECIFIED SITES: ICD-10-CM

## 2020-10-19 PROCEDURE — 0JBL0ZZ EXCISION OF RIGHT UPPER LEG SUBCUTANEOUS TISSUE AND FASCIA, OPEN APPROACH: ICD-10-PCS | Performed by: SURGERY

## 2020-10-19 PROCEDURE — 88304 TISSUE EXAM BY PATHOLOGIST: CPT | Performed by: SURGERY

## 2020-10-19 PROCEDURE — 81025 URINE PREGNANCY TEST: CPT | Performed by: SURGERY

## 2020-10-19 RX ORDER — LIDOCAINE HYDROCHLORIDE AND EPINEPHRINE 10; 10 MG/ML; UG/ML
INJECTION, SOLUTION INFILTRATION; PERINEURAL AS NEEDED
Status: DISCONTINUED | OUTPATIENT
Start: 2020-10-19 | End: 2020-10-19 | Stop reason: HOSPADM

## 2020-10-19 RX ORDER — ONDANSETRON 2 MG/ML
4 INJECTION INTRAMUSCULAR; INTRAVENOUS AS NEEDED
Status: DISCONTINUED | OUTPATIENT
Start: 2020-10-19 | End: 2020-10-19

## 2020-10-19 RX ORDER — BUPIVACAINE HYDROCHLORIDE 5 MG/ML
INJECTION, SOLUTION EPIDURAL; INTRACAUDAL AS NEEDED
Status: DISCONTINUED | OUTPATIENT
Start: 2020-10-19 | End: 2020-10-19 | Stop reason: HOSPADM

## 2020-10-19 RX ORDER — CEFAZOLIN SODIUM/WATER 2 G/20 ML
2 SYRINGE (ML) INTRAVENOUS ONCE
Status: COMPLETED | OUTPATIENT
Start: 2020-10-19 | End: 2020-10-19

## 2020-10-19 RX ORDER — HEPARIN SODIUM 5000 [USP'U]/ML
5000 INJECTION, SOLUTION INTRAVENOUS; SUBCUTANEOUS ONCE
Status: COMPLETED | OUTPATIENT
Start: 2020-10-19 | End: 2020-10-19

## 2020-10-19 RX ORDER — HYDROCODONE BITARTRATE AND ACETAMINOPHEN 5; 325 MG/1; MG/1
1 TABLET ORAL AS NEEDED
Status: DISCONTINUED | OUTPATIENT
Start: 2020-10-19 | End: 2020-10-19

## 2020-10-19 RX ORDER — LABETALOL HYDROCHLORIDE 5 MG/ML
5 INJECTION, SOLUTION INTRAVENOUS EVERY 5 MIN PRN
Status: DISCONTINUED | OUTPATIENT
Start: 2020-10-19 | End: 2020-10-19

## 2020-10-19 RX ORDER — METOCLOPRAMIDE HYDROCHLORIDE 5 MG/ML
10 INJECTION INTRAMUSCULAR; INTRAVENOUS AS NEEDED
Status: DISCONTINUED | OUTPATIENT
Start: 2020-10-19 | End: 2020-10-19

## 2020-10-19 RX ORDER — SODIUM CHLORIDE, SODIUM LACTATE, POTASSIUM CHLORIDE, CALCIUM CHLORIDE 600; 310; 30; 20 MG/100ML; MG/100ML; MG/100ML; MG/100ML
INJECTION, SOLUTION INTRAVENOUS CONTINUOUS
Status: DISCONTINUED | OUTPATIENT
Start: 2020-10-19 | End: 2020-10-19

## 2020-10-19 RX ORDER — HYDROMORPHONE HYDROCHLORIDE 1 MG/ML
0.4 INJECTION, SOLUTION INTRAMUSCULAR; INTRAVENOUS; SUBCUTANEOUS EVERY 5 MIN PRN
Status: DISCONTINUED | OUTPATIENT
Start: 2020-10-19 | End: 2020-10-19

## 2020-10-19 RX ORDER — LIDOCAINE HYDROCHLORIDE 10 MG/ML
INJECTION, SOLUTION EPIDURAL; INFILTRATION; INTRACAUDAL; PERINEURAL AS NEEDED
Status: DISCONTINUED | OUTPATIENT
Start: 2020-10-19 | End: 2020-10-19 | Stop reason: SURG

## 2020-10-19 RX ORDER — MIDAZOLAM HYDROCHLORIDE 1 MG/ML
INJECTION INTRAMUSCULAR; INTRAVENOUS AS NEEDED
Status: DISCONTINUED | OUTPATIENT
Start: 2020-10-19 | End: 2020-10-19 | Stop reason: SURG

## 2020-10-19 RX ORDER — NALOXONE HYDROCHLORIDE 0.4 MG/ML
80 INJECTION, SOLUTION INTRAMUSCULAR; INTRAVENOUS; SUBCUTANEOUS AS NEEDED
Status: DISCONTINUED | OUTPATIENT
Start: 2020-10-19 | End: 2020-10-19

## 2020-10-19 RX ORDER — HYDROCODONE BITARTRATE AND ACETAMINOPHEN 5; 325 MG/1; MG/1
2 TABLET ORAL AS NEEDED
Status: DISCONTINUED | OUTPATIENT
Start: 2020-10-19 | End: 2020-10-19

## 2020-10-19 RX ADMIN — CEFAZOLIN SODIUM/WATER 2 G: 2 G/20 ML SYRINGE (ML) INTRAVENOUS at 15:10:00

## 2020-10-19 RX ADMIN — MIDAZOLAM HYDROCHLORIDE 2 MG: 1 INJECTION INTRAMUSCULAR; INTRAVENOUS at 15:03:00

## 2020-10-19 RX ADMIN — LIDOCAINE HYDROCHLORIDE 30 MG: 10 INJECTION, SOLUTION EPIDURAL; INFILTRATION; INTRACAUDAL; PERINEURAL at 15:08:00

## 2020-10-19 RX ADMIN — SODIUM CHLORIDE, SODIUM LACTATE, POTASSIUM CHLORIDE, CALCIUM CHLORIDE: 600; 310; 30; 20 INJECTION, SOLUTION INTRAVENOUS at 15:03:00

## 2020-10-19 RX ADMIN — SODIUM CHLORIDE, SODIUM LACTATE, POTASSIUM CHLORIDE, CALCIUM CHLORIDE: 600; 310; 30; 20 INJECTION, SOLUTION INTRAVENOUS at 15:43:00

## 2020-10-19 NOTE — ANESTHESIA POSTPROCEDURE EVALUATION
[address] [facility] Patient Status:  Hospital Outpatient Surgery   Age/Gender 44year old female MRN [id number]   [facility] SURGERY Attending Earline Ng MD   Hosp Day # 0 PCP Reynold Jackson DO       Anesthesia Post-

## 2020-10-19 NOTE — ANESTHESIA PREPROCEDURE EVALUATION
PRE-OP EVALUATION    Patient Name: Shruthi Shipley    Pre-op Diagnosis: Lipoma of other specified sites [D17.79]    Procedure(s):  EXCISION LIPOMA ON RIGHT HIP    Surgeon(s) and Role:     Chuyita Moreno MD - Primary    Pre-op vitals reviewed. (BMI) 25.0-29.9     Recurrent boils     Insomnia due to other mental disorder     Hip mass, right     Lipoma of torso     Psychophysiological insomnia     Nocturnal leg movements     Snoring     Frequent nocturnal awakening          Past Surgical History:

## 2020-10-19 NOTE — OPERATIVE REPORT
[facility]  Operative Note    Silvana Erm Location: OR   [facility] [id number] MRN [id number]   Admission Date 10/19/2020 Operation Date 10/19/2020   Attending Physician Muriel Mancini MD Operating Physician Rich Angel MD     Date of procedur and the patient does not have any further questions at this time and wishes to proceed with surgery. The patient identified the lesion, confirmed the location of the lesion and was marked in the preoperative holding area. Summary of case:  The patient wa

## 2020-10-19 NOTE — INTERVAL H&P NOTE
Pre-op Diagnosis: Lipoma of other specified sites [D17.79]    The above referenced H&P was reviewed by Sarabjit Comer MD on 10/19/2020, the patient was examined and no significant changes have occurred in the patient's condition since the H&P was performe

## 2020-10-20 ENCOUNTER — PATIENT MESSAGE (OUTPATIENT)
Dept: FAMILY MEDICINE CLINIC | Facility: CLINIC | Age: 50
End: 2020-10-20

## 2020-10-20 NOTE — TELEPHONE ENCOUNTER
From: Nicolle Harrison  To: Suyapa Abdi DO  Sent: 10/20/2020 12:19 PM CDT  Subject: Non-Urgent Medical Question    Hi. I’m wondering if I still need to get an abdominal scan now that [facility] had my surgery to remove the Lipoma?  It’s on my list of thin

## 2020-11-03 ENCOUNTER — OFFICE VISIT (OUTPATIENT)
Dept: SURGERY | Facility: CLINIC | Age: 50
End: 2020-11-03

## 2020-11-03 VITALS
TEMPERATURE: 98 F | SYSTOLIC BLOOD PRESSURE: 125 MMHG | BODY MASS INDEX: 29.35 KG/M2 | WEIGHT: 187 LBS | HEIGHT: 67 IN | DIASTOLIC BLOOD PRESSURE: 71 MMHG | HEART RATE: 80 BPM

## 2020-11-03 DIAGNOSIS — D17.79 LIPOMA OF OTHER SPECIFIED SITES: Primary | ICD-10-CM

## 2020-11-03 PROCEDURE — 3074F SYST BP LT 130 MM HG: CPT | Performed by: PHYSICIAN ASSISTANT

## 2020-11-03 PROCEDURE — 3008F BODY MASS INDEX DOCD: CPT | Performed by: PHYSICIAN ASSISTANT

## 2020-11-03 PROCEDURE — 99024 POSTOP FOLLOW-UP VISIT: CPT | Performed by: PHYSICIAN ASSISTANT

## 2020-11-03 PROCEDURE — 3078F DIAST BP <80 MM HG: CPT | Performed by: PHYSICIAN ASSISTANT

## 2020-11-03 NOTE — PROGRESS NOTES
Post Operative Visit Note       Active Problems  1. Lipoma of other specified sites         Chief Complaint   Patient presents with:  Post-Op: follow up lipoma right hip 10/19/20. Pt states no pain no signs of infection.  Pt denies n/v.         History of P Sexual Activity      Alcohol use: Not Currently        Comment: no alcohol over 1 yr      Drug use: Never       Current Outpatient Medications:   •  Cholecalciferol (VITAMIN D3) 50 MCG (2000 UT) Oral Tab, Take 1 tablet by mouth daily. , Disp: , Rfl:   •  Cy change and rash. Neurological: Negative for tremors, syncope and weakness. Hematological: Negative for adenopathy. Does not bruise/bleed easily. Psychiatric/Behavioral: Negative for behavioral problems and sleep disturbance.        Physical Findings

## 2020-11-10 ENCOUNTER — PATIENT MESSAGE (OUTPATIENT)
Dept: FAMILY MEDICINE CLINIC | Facility: CLINIC | Age: 50
End: 2020-11-10

## 2020-11-10 DIAGNOSIS — F51.05 INSOMNIA DUE TO OTHER MENTAL DISORDER: ICD-10-CM

## 2020-11-10 DIAGNOSIS — F99 INSOMNIA DUE TO OTHER MENTAL DISORDER: ICD-10-CM

## 2020-11-10 DIAGNOSIS — F41.1 GENERALIZED ANXIETY DISORDER: ICD-10-CM

## 2020-11-10 RX ORDER — ESCITALOPRAM OXALATE 20 MG/1
20 TABLET ORAL DAILY
Qty: 90 TABLET | Refills: 0 | Status: SHIPPED | OUTPATIENT
Start: 2020-11-10 | End: 2021-03-08

## 2020-11-11 RX ORDER — BUPROPION HYDROCHLORIDE 150 MG/1
150 TABLET ORAL DAILY
Qty: 30 TABLET | Refills: 0 | Status: SHIPPED | OUTPATIENT
Start: 2020-11-11 | End: 2020-12-31

## 2020-11-13 ENCOUNTER — MED REC SCAN ONLY (OUTPATIENT)
Dept: FAMILY MEDICINE CLINIC | Facility: CLINIC | Age: 50
End: 2020-11-13

## 2020-11-22 ENCOUNTER — OFFICE VISIT (OUTPATIENT)
Dept: SLEEP CENTER | Age: 50
End: 2020-11-22
Attending: INTERNAL MEDICINE
Payer: COMMERCIAL

## 2020-11-22 DIAGNOSIS — G47.00 FREQUENT NOCTURNAL AWAKENING: ICD-10-CM

## 2020-11-22 DIAGNOSIS — R25.8 NOCTURNAL LEG MOVEMENTS: ICD-10-CM

## 2020-11-22 DIAGNOSIS — F51.04 PSYCHOPHYSIOLOGICAL INSOMNIA: ICD-10-CM

## 2020-11-22 DIAGNOSIS — R06.83 SNORING: ICD-10-CM

## 2020-11-22 PROCEDURE — 95810 POLYSOM 6/> YRS 4/> PARAM: CPT

## 2020-11-25 NOTE — TELEPHONE ENCOUNTER
From: Jose Ho  Sent: 11/24/2020 8:17 PM CST  To: [facility] Clinical Staff  Subject: RE: Non-Urgent Medical Question    But I did just have a surgery. Does nobody know my blood type?

## 2020-11-25 NOTE — PROCEDURES
[address]       Accredited by the [facility] ([facility])    PATIENT'S NAME:        Chato Francis  ATTENDING PHYSICIAN:   Tatiana Lauren M.D.   REFERRING PHYSICIAN:   HOLLIE Coombs with arousal index was 7.8. EEG:  With the limited montage recorded, no EEG abnormalities were observed. IMPRESSION:  This study demonstrates primary snoring without evidence of significant obstructive sleep apnea.     RECOMMENDATIONS:  The patient sh

## 2020-11-30 ENCOUNTER — PATIENT MESSAGE (OUTPATIENT)
Dept: FAMILY MEDICINE CLINIC | Facility: CLINIC | Age: 50
End: 2020-11-30

## 2020-11-30 RX ORDER — GABAPENTIN 300 MG/1
CAPSULE ORAL
Qty: 30 CAPSULE | Refills: 0 | OUTPATIENT
Start: 2020-11-30

## 2020-11-30 RX ORDER — ALPRAZOLAM 0.25 MG/1
0.25 TABLET ORAL NIGHTLY PRN
Qty: 30 TABLET | Refills: 1 | Status: SHIPPED | OUTPATIENT
Start: 2020-11-30 | End: 2021-01-28

## 2020-11-30 RX ORDER — GABAPENTIN 300 MG/1
300 CAPSULE ORAL NIGHTLY
Qty: 30 CAPSULE | Refills: 1 | Status: SHIPPED | OUTPATIENT
Start: 2020-11-30 | End: 2021-01-28

## 2020-11-30 RX ORDER — ALPRAZOLAM 0.5 MG/1
TABLET ORAL
Qty: 30 TABLET | Refills: 0 | OUTPATIENT
Start: 2020-11-30

## 2020-11-30 NOTE — TELEPHONE ENCOUNTER
Pt requesting refill of   Requested Prescriptions     Pending Prescriptions Disp Refills   • ALPRAZolam 0.25 MG Oral Tab 30 tablet 1     Sig: Take 1 tablet (0.25 mg total) by mouth nightly as needed for Sleep or Anxiety.    • gabapentin 300 MG Oral Cap 30 c

## 2020-12-01 NOTE — TELEPHONE ENCOUNTER
LM on pt's phone that there was a denial because we received a duplicate request so one was denied, the other approved.   meds sent to her pharmacy  MENDEZ to call and schedule appt for medication monitoring and f/up

## 2020-12-01 NOTE — TELEPHONE ENCOUNTER
From: Carline Messina  To: Ann Silva DO  Sent: 11/30/2020 10:59 PM CST  Subject: Prescription Question    Hi. I put in a request to have my Gabapentin and alprazolam refilled and got denials for both. I'm a little confused.  I called your office

## 2020-12-02 ENCOUNTER — HOSPITAL ENCOUNTER (OUTPATIENT)
Dept: MAMMOGRAPHY | Facility: HOSPITAL | Age: 50
Discharge: HOME | End: 2020-12-02
Attending: FAMILY MEDICINE
Payer: COMMERCIAL

## 2020-12-02 DIAGNOSIS — Z12.31 ENCOUNTER FOR SCREENING MAMMOGRAM FOR MALIGNANT NEOPLASM OF BREAST: ICD-10-CM

## 2020-12-02 DIAGNOSIS — Z00.00 ANNUAL PHYSICAL EXAM: ICD-10-CM

## 2020-12-02 PROCEDURE — 77067 SCR MAMMO BI INCL CAD: CPT | Performed by: FAMILY MEDICINE

## 2020-12-02 PROCEDURE — 77063 BREAST TOMOSYNTHESIS BI: CPT | Performed by: FAMILY MEDICINE

## 2020-12-28 ENCOUNTER — TELEPHONE (OUTPATIENT)
Dept: FAMILY MEDICINE CLINIC | Facility: CLINIC | Age: 50
End: 2020-12-28

## 2020-12-28 NOTE — TELEPHONE ENCOUNTER
Spoke to radiology dept mammogram  They have updated pt's phone number in Epic and are asking office to contact pt to schedule additional imaging after doing mammogram comparison from outside images. Please see mammogram report 12/2/2020.    Recommended pt

## 2020-12-28 NOTE — TELEPHONE ENCOUNTER
Spoke to pt and let her know need for additional mammogram imaging following comparison. Gave her number for central scheduling to call and make appt.  Patient voiced understanding and will call to make appt for additional imaging

## 2020-12-30 ENCOUNTER — PATIENT MESSAGE (OUTPATIENT)
Dept: FAMILY MEDICINE CLINIC | Facility: CLINIC | Age: 50
End: 2020-12-30

## 2020-12-30 NOTE — TELEPHONE ENCOUNTER
From: Jag Held  To: Kelli Boyd DO  Sent: 12/30/2020 4:42 PM CST  Subject: Non-Urgent Medical Question    Hi and Happy New Year! I'm wondering if a Keto diet would be ok for me to start.  I'm over weight and the only diet that has really wo

## 2020-12-31 RX ORDER — BUPROPION HYDROCHLORIDE 150 MG/1
TABLET ORAL
Qty: 30 TABLET | Refills: 0 | Status: SHIPPED | OUTPATIENT
Start: 2020-12-31 | End: 2021-02-13

## 2020-12-31 NOTE — TELEPHONE ENCOUNTER
No keto diet  Low carb less than 50g daily or less.  Mediterranean diet  Her cholesterol is severely elevated- high fat diet may worsen  Needs OV to address  Please inform

## 2021-01-05 ENCOUNTER — HOSPITAL ENCOUNTER (OUTPATIENT)
Dept: MAMMOGRAPHY | Facility: HOSPITAL | Age: 51
Discharge: HOME | End: 2021-01-05
Attending: FAMILY MEDICINE
Payer: COMMERCIAL

## 2021-01-05 DIAGNOSIS — R92.2 INCONCLUSIVE MAMMOGRAM: ICD-10-CM

## 2021-01-05 PROCEDURE — 77061 BREAST TOMOSYNTHESIS UNI: CPT | Performed by: FAMILY MEDICINE

## 2021-01-05 PROCEDURE — 76642 ULTRASOUND BREAST LIMITED: CPT | Performed by: FAMILY MEDICINE

## 2021-01-05 PROCEDURE — 77065 DX MAMMO INCL CAD UNI: CPT | Performed by: FAMILY MEDICINE

## 2021-01-28 RX ORDER — GABAPENTIN 300 MG/1
300 CAPSULE ORAL NIGHTLY
Qty: 30 CAPSULE | Refills: 0 | Status: SHIPPED | OUTPATIENT
Start: 2021-01-28 | End: 2021-02-25

## 2021-01-28 RX ORDER — ALPRAZOLAM 0.25 MG/1
0.25 TABLET ORAL NIGHTLY PRN
Qty: 30 TABLET | Refills: 0 | Status: SHIPPED | OUTPATIENT
Start: 2021-01-28

## 2021-01-28 NOTE — TELEPHONE ENCOUNTER
Pt requesting refill of   Requested Prescriptions     Pending Prescriptions Disp Refills   • GABAPENTIN 300 MG Oral Cap [Pharmacy Med Name: Gabapentin Oral Capsule 300 MG] 30 capsule 0     Sig: TAKE ONE CAPSULE BY MOUTH NIGHTLY AT BEDTIME     No protocol a

## 2021-01-28 NOTE — TELEPHONE ENCOUNTER
Pt called office stating she needs a refill on her sleep and anxiety medication. Pt states she only has 2 days left of it. Pt wants to speak to nurse to give her the names of the medications she needs. Pt has a video call schedule for 02/01/2021.

## 2021-01-29 RX ORDER — GABAPENTIN 300 MG/1
CAPSULE ORAL
Qty: 30 CAPSULE | Refills: 0 | OUTPATIENT
Start: 2021-01-29

## 2021-02-01 ENCOUNTER — TELEMEDICINE (OUTPATIENT)
Dept: FAMILY MEDICINE CLINIC | Facility: CLINIC | Age: 51
End: 2021-02-01
Payer: COMMERCIAL

## 2021-02-01 DIAGNOSIS — G25.81 RESTLESS LEG SYNDROME: ICD-10-CM

## 2021-02-01 DIAGNOSIS — R41.3 MEMORY DIFFICULTIES: ICD-10-CM

## 2021-02-01 DIAGNOSIS — F10.11 HISTORY OF ALCOHOL ABUSE: ICD-10-CM

## 2021-02-01 DIAGNOSIS — E78.00 PURE HYPERCHOLESTEROLEMIA: ICD-10-CM

## 2021-02-01 PROCEDURE — 99214 OFFICE O/P EST MOD 30 MIN: CPT | Performed by: FAMILY MEDICINE

## 2021-02-01 RX ORDER — ATORVASTATIN CALCIUM 40 MG/1
40 TABLET, FILM COATED ORAL NIGHTLY
Qty: 90 TABLET | Refills: 0 | Status: SHIPPED | OUTPATIENT
Start: 2021-02-01 | End: 2021-05-03

## 2021-02-01 RX ORDER — VALACYCLOVIR HYDROCHLORIDE 1 G/1
TABLET, FILM COATED ORAL EVERY 12 HOURS SCHEDULED
COMMUNITY
End: 2021-04-05

## 2021-02-01 RX ORDER — ROPINIROLE 0.5 MG/1
TABLET, FILM COATED ORAL
Qty: 28 TABLET | Refills: 0 | Status: SHIPPED | OUTPATIENT
Start: 2021-02-01 | End: 2021-02-25

## 2021-02-01 NOTE — PROGRESS NOTES
Due to COVID-19 ACTION PLAN, the patient's office visit was converted to a phone or video visit.   Time Spent: 32 mins    Subjective     HPI:   Rosa Gonzales is a 44year old female who presents for occultly losing weight-could not manage a Mediterr bed due to patient's movements. Had sleep study 11/24/2020 that was negative for sleep apnea but periodic limb movement was 38 and arousal index was 7.8.   Spoke with pulmonologist Dr. Toña Cox and patient was just under the threshold for diagnosis of perio SARS-CoV-2 (COVID-19) 10/16/2020 Not Detected    Office Visit on 09/17/2020   Component Date Value   • Glucose 09/17/2020 95    • Sodium 09/17/2020 138    • Potassium 09/17/2020 4.7    • Chloride 09/17/2020 107    • CO2 09/17/2020 27.0    • Anion Gap 09/17 lesion or malignancy    • Specimen Adequacy 09/17/2020 Satisfactory for evaluation.  Endocervical or metaplastic cells present    • General Categorization 09/17/2020 Negative for intraepithelial lesion or malignancy    • HPV High Risk mRNA 09/17/2020 days then increase 0.5mg po 1 hour prior to bedtime.  -     NEURO - INTERNAL  Trial of repairing all risks and benefits of medication reviewed.   If movement is affecting REM sleep, may cause memory issues since poor quality sleep  Sleep study was nadya worsening or changing symptoms. Patient is to call with any side effects or complications from the treatments as a result of today.      Please note that the following visit was completed using two-way, real-time interactive audio and/or video communicatio

## 2021-02-04 ENCOUNTER — LAB ENCOUNTER (OUTPATIENT)
Dept: LAB | Age: 51
End: 2021-02-04
Attending: NURSE PRACTITIONER
Payer: COMMERCIAL

## 2021-02-04 ENCOUNTER — OFFICE VISIT (OUTPATIENT)
Dept: INTERNAL MEDICINE CLINIC | Facility: CLINIC | Age: 51
End: 2021-02-04
Payer: COMMERCIAL

## 2021-02-04 VITALS
RESPIRATION RATE: 16 BRPM | DIASTOLIC BLOOD PRESSURE: 70 MMHG | HEART RATE: 86 BPM | WEIGHT: 192 LBS | SYSTOLIC BLOOD PRESSURE: 110 MMHG | HEIGHT: 67 IN | BODY MASS INDEX: 30.13 KG/M2

## 2021-02-04 DIAGNOSIS — Z51.81 THERAPEUTIC DRUG MONITORING: ICD-10-CM

## 2021-02-04 DIAGNOSIS — F41.1 GENERALIZED ANXIETY DISORDER: ICD-10-CM

## 2021-02-04 DIAGNOSIS — E78.00 PURE HYPERCHOLESTEROLEMIA: ICD-10-CM

## 2021-02-04 DIAGNOSIS — F10.11 HISTORY OF ALCOHOL ABUSE: ICD-10-CM

## 2021-02-04 DIAGNOSIS — E66.9 OBESITY WITH BODY MASS INDEX (BMI) OF 30.0 TO 39.9: ICD-10-CM

## 2021-02-04 DIAGNOSIS — I10 ESSENTIAL HYPERTENSION, BENIGN: ICD-10-CM

## 2021-02-04 DIAGNOSIS — Z51.81 THERAPEUTIC DRUG MONITORING: Primary | ICD-10-CM

## 2021-02-04 LAB
ALBUMIN SERPL-MCNC: 4 G/DL (ref 3.4–5)
ALP LIVER SERPL-CCNC: 100 U/L
ALT SERPL-CCNC: 30 U/L
AST SERPL-CCNC: 24 U/L (ref 15–37)
BILIRUB DIRECT SERPL-MCNC: 0.1 MG/DL (ref 0–0.2)
BILIRUB SERPL-MCNC: 0.4 MG/DL (ref 0.1–2)
CHOLEST SMN-MCNC: 228 MG/DL (ref ?–200)
EST. AVERAGE GLUCOSE BLD GHB EST-MCNC: 114 MG/DL (ref 68–126)
HBA1C MFR BLD HPLC: 5.6 % (ref ?–5.7)
HDLC SERPL-MCNC: 45 MG/DL (ref 40–59)
LDLC SERPL CALC-MCNC: 148 MG/DL (ref ?–100)
M PROTEIN MFR SERPL ELPH: 7.9 G/DL (ref 6.4–8.2)
NONHDLC SERPL-MCNC: 183 MG/DL (ref ?–130)
PATIENT FASTING Y/N/NP: NO
TRIGL SERPL-MCNC: 175 MG/DL (ref 30–149)
VIT B12 SERPL-MCNC: 1448 PG/ML (ref 193–986)
VIT D+METAB SERPL-MCNC: 35.9 NG/ML (ref 30–100)
VLDLC SERPL CALC-MCNC: 35 MG/DL (ref 0–30)

## 2021-02-04 PROCEDURE — 99204 OFFICE O/P NEW MOD 45 MIN: CPT | Performed by: NURSE PRACTITIONER

## 2021-02-04 PROCEDURE — 36415 COLL VENOUS BLD VENIPUNCTURE: CPT | Performed by: NURSE PRACTITIONER

## 2021-02-04 PROCEDURE — 80076 HEPATIC FUNCTION PANEL: CPT | Performed by: FAMILY MEDICINE

## 2021-02-04 PROCEDURE — 82607 VITAMIN B-12: CPT | Performed by: NURSE PRACTITIONER

## 2021-02-04 PROCEDURE — 3078F DIAST BP <80 MM HG: CPT | Performed by: NURSE PRACTITIONER

## 2021-02-04 PROCEDURE — 80061 LIPID PANEL: CPT | Performed by: FAMILY MEDICINE

## 2021-02-04 PROCEDURE — 82306 VITAMIN D 25 HYDROXY: CPT | Performed by: NURSE PRACTITIONER

## 2021-02-04 PROCEDURE — 3074F SYST BP LT 130 MM HG: CPT | Performed by: NURSE PRACTITIONER

## 2021-02-04 PROCEDURE — 83036 HEMOGLOBIN GLYCOSYLATED A1C: CPT | Performed by: NURSE PRACTITIONER

## 2021-02-04 PROCEDURE — 3008F BODY MASS INDEX DOCD: CPT | Performed by: NURSE PRACTITIONER

## 2021-02-04 NOTE — PATIENT INSTRUCTIONS
We are here to support you with weight loss, but please remember that you still need your primary care provider for your routine health maintenance.       PLAN:  intermittent fasting- Dr. Ana Ward 16:8  Go to the lab for blood work   Follow up with me in 1 pedrito ** Daily INPUT> Look at nutrition section-- \"nutrients\" and it will break down your macros for the day (ie. Protein, carbs, fibers, sugars and fats). Try to stay within these numbers daily     2.  \"7 minute workout\" to help with exercis

## 2021-02-04 NOTE — PROGRESS NOTES
HISTORY OF PRESENT ILLNESS  Patient presents with:  Weight Problem: referred by pcp, no previous meds    Boston Huddleston is a 44year old female new to our office today for initiation of medical weight loss program.  Patient presents today with c/o e meals/week    Social hx and lifestyle reviewed:    Work: unemployed   Marital status:     Support: yes  Tobacco use: none  ETOH use:   per/week  Supplements: vitamin e  Exercise: 15 min (stepper at home) 2-3 days per week  Stress level:5 /10  Sleep atraumatic, normocephalic, O/p: Mallampati score- 2  NECK: supple, non tender, no adenopathy, no thyromegaly  LUNGS: CTA in all fields, breathing non labored  CARDIO: RRR without murmur  GI: +BS, soft, no masses, HSM or tenderness  EXTREMITIES: no cyanosis 24 Hr, TAKE ONE TABLET BY MOUTH ONE TIME DAILY , Disp: 30 tablet, Rfl: 0    •  escitalopram 20 MG Oral Tab, Take 1 tablet (20 mg total) by mouth daily. , Disp: 90 tablet, Rfl: 0    •  Cholecalciferol (VITAMIN D3) 50 MCG (2000 UT) Oral Tab, Take 1 tablet by management. · Counseled on comprehensive weight loss plan including attention to nutrition, exercise and behavior/stress management for success. See patient instruction below for more details.     · Weight Loss Contract reviewed and signed today 2/4/2021

## 2021-02-05 ENCOUNTER — PATIENT MESSAGE (OUTPATIENT)
Dept: INTERNAL MEDICINE CLINIC | Facility: CLINIC | Age: 51
End: 2021-02-05

## 2021-02-05 NOTE — TELEPHONE ENCOUNTER
From: Carline Messina  To: PARMJIT Gramajo  Sent: 2/5/2021 7:47 AM CST  Subject: Test Results Question    Hi. I am currently taking a B12 supplement. Should I stop since my test results show it is high?     Thanks,  Kirsty Bill

## 2021-02-05 NOTE — PROGRESS NOTES
a1c 5.6%- normal  Vitamin b12 (over normal range) are you taking extra supplements?  vitamin D level (on lower end)  start daily maintenance vitamin D 2000 Units (OTC)

## 2021-02-07 NOTE — TELEPHONE ENCOUNTER
Patient is taking daily B12 5000 mcg. What do you want her to do? Collected:  2/4/2021  2:02 PM   Status:  Final result   Dx:  History of alcohol abuse; Therapeutic. ..   Component   Ref Range & Units 2/4/21  2:02 PM   Vitamin B12   193 - 986 pg/mL 1,44

## 2021-02-13 RX ORDER — BUPROPION HYDROCHLORIDE 150 MG/1
150 TABLET ORAL DAILY
Qty: 90 TABLET | Refills: 0 | Status: SHIPPED | OUTPATIENT
Start: 2021-02-13 | End: 2021-04-05

## 2021-02-25 DIAGNOSIS — G25.81 RESTLESS LEG SYNDROME: ICD-10-CM

## 2021-02-25 RX ORDER — GABAPENTIN 300 MG/1
CAPSULE ORAL
Qty: 30 CAPSULE | Refills: 0 | Status: SHIPPED | OUTPATIENT
Start: 2021-02-25 | End: 2021-03-31

## 2021-02-25 RX ORDER — ROPINIROLE 0.5 MG/1
TABLET, FILM COATED ORAL
Qty: 28 TABLET | Refills: 0 | Status: SHIPPED | OUTPATIENT
Start: 2021-02-25 | End: 2021-03-31

## 2021-02-25 NOTE — TELEPHONE ENCOUNTER
Pt requesting refill of   Requested Prescriptions     Pending Prescriptions Disp Refills   • GABAPENTIN 300 MG Oral Cap [Pharmacy Med Name: Gabapentin Oral Capsule 300 MG] 30 capsule 0     Sig: TAKE ONE CAPSULE BY MOUTH ONE TIME nightly   • rOPINIRole HCl

## 2021-03-07 DIAGNOSIS — F51.05 INSOMNIA DUE TO OTHER MENTAL DISORDER: ICD-10-CM

## 2021-03-07 DIAGNOSIS — F41.1 GENERALIZED ANXIETY DISORDER: ICD-10-CM

## 2021-03-07 DIAGNOSIS — F99 INSOMNIA DUE TO OTHER MENTAL DISORDER: ICD-10-CM

## 2021-03-08 RX ORDER — ESCITALOPRAM OXALATE 20 MG/1
TABLET ORAL
Qty: 90 TABLET | Refills: 0 | Status: SHIPPED | OUTPATIENT
Start: 2021-03-08 | End: 2021-04-05

## 2021-03-08 NOTE — TELEPHONE ENCOUNTER
Pt requesting refill of   Requested Prescriptions     Pending Prescriptions Disp Refills   • ESCITALOPRAM 20 MG Oral Tab [Pharmacy Med Name: Escitalopram Oxalate Oral Tablet 20 MG] 90 tablet 0     Sig: TAKE ONE TABLET BY MOUTH ONE TIME DAILY        No prot

## 2021-03-11 ENCOUNTER — OFFICE VISIT (OUTPATIENT)
Dept: NEUROLOGY | Facility: CLINIC | Age: 51
End: 2021-03-11
Payer: COMMERCIAL

## 2021-03-11 ENCOUNTER — LAB ENCOUNTER (OUTPATIENT)
Dept: LAB | Age: 51
End: 2021-03-11
Attending: Other
Payer: COMMERCIAL

## 2021-03-11 ENCOUNTER — OFFICE VISIT (OUTPATIENT)
Dept: INTERNAL MEDICINE CLINIC | Facility: CLINIC | Age: 51
End: 2021-03-11
Payer: COMMERCIAL

## 2021-03-11 VITALS
BODY MASS INDEX: 30 KG/M2 | RESPIRATION RATE: 16 BRPM | WEIGHT: 191 LBS | HEART RATE: 78 BPM | DIASTOLIC BLOOD PRESSURE: 76 MMHG | SYSTOLIC BLOOD PRESSURE: 130 MMHG

## 2021-03-11 VITALS
RESPIRATION RATE: 16 BRPM | WEIGHT: 196 LBS | HEART RATE: 76 BPM | DIASTOLIC BLOOD PRESSURE: 80 MMHG | SYSTOLIC BLOOD PRESSURE: 122 MMHG | BODY MASS INDEX: 30.76 KG/M2 | HEIGHT: 67 IN

## 2021-03-11 DIAGNOSIS — E78.00 PURE HYPERCHOLESTEROLEMIA: ICD-10-CM

## 2021-03-11 DIAGNOSIS — I10 ESSENTIAL HYPERTENSION, BENIGN: ICD-10-CM

## 2021-03-11 DIAGNOSIS — F41.9 ANXIETY: ICD-10-CM

## 2021-03-11 DIAGNOSIS — F10.11 HISTORY OF ALCOHOL ABUSE: ICD-10-CM

## 2021-03-11 DIAGNOSIS — G25.81 RESTLESS LEG SYNDROME: Primary | ICD-10-CM

## 2021-03-11 DIAGNOSIS — G47.00 INSOMNIA, UNSPECIFIED TYPE: ICD-10-CM

## 2021-03-11 DIAGNOSIS — R41.3 SHORT-TERM MEMORY LOSS: ICD-10-CM

## 2021-03-11 DIAGNOSIS — E66.9 OBESITY WITH BODY MASS INDEX (BMI) OF 30.0 TO 39.9: ICD-10-CM

## 2021-03-11 DIAGNOSIS — Z51.81 THERAPEUTIC DRUG MONITORING: Primary | ICD-10-CM

## 2021-03-11 DIAGNOSIS — G25.81 RESTLESS LEG SYNDROME: ICD-10-CM

## 2021-03-11 DIAGNOSIS — F41.1 GENERALIZED ANXIETY DISORDER: ICD-10-CM

## 2021-03-11 LAB
DEPRECATED HBV CORE AB SER IA-ACNC: 137.1 NG/ML
IRON SATURATION: 16 %
IRON SERPL-MCNC: 59 UG/DL
TOTAL IRON BINDING CAPACITY: 358 UG/DL (ref 240–450)
TRANSFERRIN SERPL-MCNC: 240 MG/DL (ref 200–360)

## 2021-03-11 PROCEDURE — 83540 ASSAY OF IRON: CPT | Performed by: OTHER

## 2021-03-11 PROCEDURE — 3078F DIAST BP <80 MM HG: CPT | Performed by: OTHER

## 2021-03-11 PROCEDURE — 83550 IRON BINDING TEST: CPT | Performed by: OTHER

## 2021-03-11 PROCEDURE — 99213 OFFICE O/P EST LOW 20 MIN: CPT | Performed by: NURSE PRACTITIONER

## 2021-03-11 PROCEDURE — 99244 OFF/OP CNSLTJ NEW/EST MOD 40: CPT | Performed by: OTHER

## 2021-03-11 PROCEDURE — 3074F SYST BP LT 130 MM HG: CPT | Performed by: NURSE PRACTITIONER

## 2021-03-11 PROCEDURE — 3075F SYST BP GE 130 - 139MM HG: CPT | Performed by: OTHER

## 2021-03-11 PROCEDURE — 82728 ASSAY OF FERRITIN: CPT | Performed by: OTHER

## 2021-03-11 PROCEDURE — 3008F BODY MASS INDEX DOCD: CPT | Performed by: NURSE PRACTITIONER

## 2021-03-11 PROCEDURE — 36415 COLL VENOUS BLD VENIPUNCTURE: CPT | Performed by: OTHER

## 2021-03-11 PROCEDURE — 3079F DIAST BP 80-89 MM HG: CPT | Performed by: NURSE PRACTITIONER

## 2021-03-11 RX ORDER — TOPIRAMATE 25 MG/1
25 TABLET ORAL 2 TIMES DAILY
Qty: 60 TABLET | Refills: 0 | Status: SHIPPED | OUTPATIENT
Start: 2021-03-11 | End: 2021-04-08

## 2021-03-11 NOTE — PROGRESS NOTES
Mikel [address]   Neurology    [facility] Record Marek Patient Status:  No patient class for patient encounter    1970 MRN [id number]   Location [address] [place] [address], [address] [place] [address] Drive, [address] [place], [place] [address]david,  B1 (THIAMINE), WHOLE B      70 - 180 nmol/L   397   FOLATE (FOLIC ACID), SERUM      >=8.7 ng/mL   19.1   Vitamin B12      193 - 986 pg/mL 1,448 (H)  655   TSH      0.358 - 3.740 mIU/mL  2.730      Past Medical History:  Past Medical History:   Diagnosis Da mouth nightly., Disp: 90 tablet, Rfl: 0  •  ALPRAZolam 0.25 MG Oral Tab, Take 1 tablet (0.25 mg total) by mouth nightly as needed for Sleep or Anxiety. , Disp: 30 tablet, Rfl: 0  •  Cholecalciferol (VITAMIN D3) 50 MCG (2000 UT) Oral Tab, Take 1 tablet by mo proximal and distal muscles of the arms and legs. Deep tendon reflexes were 2 at the biceps, brachioradialis, triceps, knee jerk, and ankle jerk. Plantar responses were flexor bilaterally. Sensory exam revealed normal light touch perception.  Vibratory p Keiry Kim DO  Neuromuscular and General Neurology  [facility]

## 2021-03-11 NOTE — PATIENT INSTRUCTIONS
We are here to support you with weight loss, but please remember that you still need your primary care provider for your routine health maintenance.       PLAN:  Topamax: take 1 tablet before dinner for the first week (to decrease side effects) and than the Lose 1.5-2 lbs per week                Activity level: not very active (can't count exercise towards calorie number per day)                   ** Daily INPUT> Look at nutrition section-- \"nutrients\" and it will break down your macros for the day (ie.  P

## 2021-03-11 NOTE — PROGRESS NOTES
HISTORY OF PRESENT ILLNESS  Patient presents with:  Weight Check: up 4 lbs     Vipul Palacios is a 44year old female here for follow up with medical weight loss program for the treatment of overweight, obesity, or morbid obesity.      Up #4 lbs  Was breathing non labored  CARDIO: RRR without murmur  GI: +BS, NT/ND, no masses or HSM  EXTREMITIES: no cyanosis, no clubbing, no edema  PSYCH: pleasant, cooperative, normal mood and affect    Lab Results   Component Value Date    GLU 95 09/17/2020    BUN 16 0  Cholecalciferol (VITAMIN D3) 50 MCG (2000 UT) Oral Tab, Take 1 tablet by mouth daily. , Disp: , Rfl:   Cyanocobalamin ([employer] VITAMIN B-12) 5000 MCG Sublingual SL Tab, Place 1 tablet under the tongue daily. , Disp: , Rfl:   Lactobacillus (PROBIOTIC ACIDOPHIL strategies to overcome barriers to successful weight loss and weight maintenance  · FITTE: ACSM recommendations (150-300 minutes/ week in active weight loss)   · Weight Loss Consent to treat reviewed and signed.     There are no Patient Instructions on file

## 2021-03-17 ENCOUNTER — HOSPITAL ENCOUNTER (OUTPATIENT)
Dept: ULTRASOUND IMAGING | Facility: HOSPITAL | Age: 51
Discharge: HOME | End: 2021-03-17
Attending: FAMILY MEDICINE
Payer: COMMERCIAL

## 2021-03-17 DIAGNOSIS — D17.1 LIPOMA OF TORSO: ICD-10-CM

## 2021-03-17 PROCEDURE — 76705 ECHO EXAM OF ABDOMEN: CPT | Performed by: FAMILY MEDICINE

## 2021-03-25 ENCOUNTER — NURSE ONLY (OUTPATIENT)
Dept: ELECTROPHYSIOLOGY | Facility: HOSPITAL | Age: 51
End: 2021-03-25
Attending: Other
Payer: COMMERCIAL

## 2021-03-25 ENCOUNTER — APPOINTMENT (OUTPATIENT)
Dept: MRI IMAGING | Facility: HOSPITAL | Age: 51
End: 2021-03-25
Attending: Other
Payer: COMMERCIAL

## 2021-03-25 DIAGNOSIS — R41.3 SHORT-TERM MEMORY LOSS: ICD-10-CM

## 2021-03-25 PROCEDURE — 95816 EEG AWAKE AND DROWSY: CPT | Performed by: OTHER

## 2021-03-25 NOTE — PROCEDURES
ELECTROENCEPHALOGRAM REPORT      Patient Name: Silvana Neville   : 1970   Requesting Physician: Dr. Martha Mcgarry   Date of Test: 3/25/2021   History: 44year old female with a few year history of short term memory loss.     TECHNICAL ASPECTS OF EE

## 2021-03-27 DIAGNOSIS — G25.81 RESTLESS LEG SYNDROME: ICD-10-CM

## 2021-03-29 DIAGNOSIS — G25.81 RESTLESS LEG SYNDROME: ICD-10-CM

## 2021-03-29 NOTE — TELEPHONE ENCOUNTER
Pt requesting refill of   Requested Prescriptions     Pending Prescriptions Disp Refills   • rOPINIRole HCl 0.5 MG Oral Tab [Pharmacy Med Name: rOPINIRole HCl Oral Tablet 0.5 MG] 28 tablet 0     Sig: TAKE 1/2 TABLET BY MOUTH ONE HOUR BEFORE BEDTIME FOR 2 D

## 2021-03-30 RX ORDER — GABAPENTIN 300 MG/1
300 CAPSULE ORAL NIGHTLY
Qty: 30 CAPSULE | Refills: 0 | OUTPATIENT
Start: 2021-03-30

## 2021-03-30 NOTE — TELEPHONE ENCOUNTER
Please verify and pain to 1 mg dose if patient taking-patient was started on titrating dose.   Or is taking half milligram

## 2021-03-30 NOTE — TELEPHONE ENCOUNTER
Pt requesting refill of   Requested Prescriptions     Pending Prescriptions Disp Refills   • GABAPENTIN 300 MG Oral Cap [Pharmacy Med Name: Gabapentin Oral Capsule 300 MG] 30 capsule 0     Sig: TAKE ONE CAPSULE BY MOUTH ONE TIME DAILY NIGHTLY        No pro

## 2021-03-31 ENCOUNTER — PATIENT MESSAGE (OUTPATIENT)
Dept: FAMILY MEDICINE CLINIC | Facility: CLINIC | Age: 51
End: 2021-03-31

## 2021-03-31 DIAGNOSIS — G25.81 RESTLESS LEG SYNDROME: ICD-10-CM

## 2021-03-31 RX ORDER — GABAPENTIN 300 MG/1
300 CAPSULE ORAL NIGHTLY
Qty: 30 CAPSULE | Refills: 0 | Status: CANCELLED | OUTPATIENT
Start: 2021-03-31

## 2021-03-31 RX ORDER — ROPINIROLE 0.5 MG/1
TABLET, FILM COATED ORAL
Qty: 28 TABLET | Refills: 2 | OUTPATIENT
Start: 2021-03-31

## 2021-03-31 RX ORDER — ROPINIROLE 0.5 MG/1
TABLET, FILM COATED ORAL
Qty: 28 TABLET | Refills: 0 | Status: CANCELLED | OUTPATIENT
Start: 2021-03-31

## 2021-03-31 RX ORDER — ROPINIROLE 1 MG/1
1 TABLET, FILM COATED ORAL NIGHTLY
Qty: 30 TABLET | Refills: 0 | Status: SHIPPED | OUTPATIENT
Start: 2021-03-31 | End: 2021-04-05

## 2021-03-31 RX ORDER — GABAPENTIN 300 MG/1
CAPSULE ORAL
Qty: 30 CAPSULE | Refills: 0 | Status: SHIPPED | OUTPATIENT
Start: 2021-03-31 | End: 2021-04-05 | Stop reason: ALTCHOICE

## 2021-03-31 RX ORDER — ROPINIROLE 0.5 MG/1
TABLET, FILM COATED ORAL
Qty: 28 TABLET | Refills: 0 | OUTPATIENT
Start: 2021-03-31

## 2021-03-31 NOTE — TELEPHONE ENCOUNTER
Pt requesting refill of   Requested Prescriptions     Pending Prescriptions Disp Refills   • gabapentin 300 MG Oral Cap 30 capsule 0     Sig: Take 1 capsule (300 mg total) by mouth nightly.    • rOPINIRole HCl 0.5 MG Oral Tab 28 tablet 0     Sig: TAKE ONE-H

## 2021-03-31 NOTE — TELEPHONE ENCOUNTER
From: Eduardo Doe  To: Serina Suarez  Sent: 3/31/2021 8:35 AM CDT  Subject: Appointment      Dear Ms. Jara,     We have received a request from your pharmacy for a refill on your medication.  Following a review of your records, we have determined th

## 2021-03-31 NOTE — TELEPHONE ENCOUNTER
Signed Rx ropinirole 1 mg 1 hour prior to bedtime #30-needs virtual visit if blood pressure normal at prior and office visit. This is new medication for patient and needs follow-up.   Please call

## 2021-04-05 ENCOUNTER — TELEMEDICINE (OUTPATIENT)
Dept: FAMILY MEDICINE CLINIC | Facility: CLINIC | Age: 51
End: 2021-04-05

## 2021-04-05 VITALS — DIASTOLIC BLOOD PRESSURE: 69 MMHG | SYSTOLIC BLOOD PRESSURE: 121 MMHG

## 2021-04-05 DIAGNOSIS — E66.9 OBESITY WITH BODY MASS INDEX (BMI) OF 30.0 TO 39.9: ICD-10-CM

## 2021-04-05 DIAGNOSIS — F41.1 GENERALIZED ANXIETY DISORDER: ICD-10-CM

## 2021-04-05 DIAGNOSIS — I10 ESSENTIAL HYPERTENSION, BENIGN: ICD-10-CM

## 2021-04-05 DIAGNOSIS — Z12.11 SCREEN FOR COLON CANCER: ICD-10-CM

## 2021-04-05 DIAGNOSIS — F51.05 INSOMNIA DUE TO OTHER MENTAL DISORDER: ICD-10-CM

## 2021-04-05 DIAGNOSIS — Z51.81 THERAPEUTIC DRUG MONITORING: ICD-10-CM

## 2021-04-05 DIAGNOSIS — G25.81 RESTLESS LEG SYNDROME: ICD-10-CM

## 2021-04-05 DIAGNOSIS — F99 INSOMNIA DUE TO OTHER MENTAL DISORDER: ICD-10-CM

## 2021-04-05 DIAGNOSIS — B00.1 RECURRENT COLD SORES: Primary | ICD-10-CM

## 2021-04-05 DIAGNOSIS — E78.00 PURE HYPERCHOLESTEROLEMIA: ICD-10-CM

## 2021-04-05 PROBLEM — R25.8 NOCTURNAL LEG MOVEMENTS: Status: RESOLVED | Noted: 2020-10-02 | Resolved: 2021-04-05

## 2021-04-05 PROCEDURE — 99214 OFFICE O/P EST MOD 30 MIN: CPT | Performed by: FAMILY MEDICINE

## 2021-04-05 PROCEDURE — 3078F DIAST BP <80 MM HG: CPT | Performed by: FAMILY MEDICINE

## 2021-04-05 PROCEDURE — 3074F SYST BP LT 130 MM HG: CPT | Performed by: FAMILY MEDICINE

## 2021-04-05 RX ORDER — BUPROPION HYDROCHLORIDE 150 MG/1
150 TABLET ORAL DAILY
Qty: 90 TABLET | Refills: 1 | Status: SHIPPED | OUTPATIENT
Start: 2021-04-05 | End: 2021-11-12

## 2021-04-05 RX ORDER — VALACYCLOVIR HYDROCHLORIDE 1 G/1
2 TABLET, FILM COATED ORAL EVERY 12 HOURS SCHEDULED
Qty: 4 TABLET | Refills: 3 | Status: SHIPPED | OUTPATIENT
Start: 2021-04-05 | End: 2021-12-02

## 2021-04-05 RX ORDER — ROPINIROLE 1 MG/1
1 TABLET, FILM COATED ORAL NIGHTLY
Qty: 90 TABLET | Refills: 1 | Status: SHIPPED | OUTPATIENT
Start: 2021-04-05 | End: 2021-11-02

## 2021-04-05 RX ORDER — ESCITALOPRAM OXALATE 20 MG/1
20 TABLET ORAL DAILY
Qty: 90 TABLET | Refills: 1 | Status: SHIPPED | OUTPATIENT
Start: 2021-04-05 | End: 2021-11-29

## 2021-04-05 NOTE — PROGRESS NOTES
Due to COVID-19 ACTION PLAN, the patient's office visit was converted to a video visit.   Time Spent: 21 mins    Patient presents with:  Medication Follow-Up  Hypertension    Subjective     HPI:   Carline Messina is a 44year old female who presents f effects to medications.       Blood Pressure 4/5/2021 4/5/2021 3/11/2021 3/11/2021 2/4/2021   /69 140/94 122/80 130/76 110/70       Chief Complaint Reviewed and Verified  No Further Nursing Notes to Review    Tobacco Reviewed  Allergies Reviewed  Medi alert and cooperative, well-nourished/well-hydrated, no no pallor or tachypnea, appropriately groomed, speaking in full sentences comfortably and Normal work of breathing, answers questions appropriately      [facility] Lab Encounter on 03/11/2021   Component 10/19/2020                      Value: This result contains rich text formatting which cannot be displayed here. • Clinical Information 10/19/2020                      Value: This result contains rich text formatting which cannot be displayed here.    • [facility] nearest ER and call office  Exercise 3 x weekly x 30-60min  Continue Wellbutrin  Sleeps well since restless leg controlled    Obesity with body mass index (BMI) of 30.0 to 39.9  Continue follow-up at weight loss clinic    Screen for colon cancer  -     GAS

## 2021-04-08 ENCOUNTER — OFFICE VISIT (OUTPATIENT)
Dept: INTERNAL MEDICINE CLINIC | Facility: CLINIC | Age: 51
End: 2021-04-08
Payer: COMMERCIAL

## 2021-04-08 ENCOUNTER — LAB ENCOUNTER (OUTPATIENT)
Dept: LAB | Age: 51
End: 2021-04-08
Attending: FAMILY MEDICINE
Payer: COMMERCIAL

## 2021-04-08 VITALS
DIASTOLIC BLOOD PRESSURE: 70 MMHG | WEIGHT: 192 LBS | HEIGHT: 67 IN | SYSTOLIC BLOOD PRESSURE: 110 MMHG | HEART RATE: 66 BPM | BODY MASS INDEX: 30.13 KG/M2

## 2021-04-08 DIAGNOSIS — I10 ESSENTIAL HYPERTENSION, BENIGN: ICD-10-CM

## 2021-04-08 DIAGNOSIS — E66.9 OBESITY WITH BODY MASS INDEX (BMI) OF 30.0 TO 39.9: ICD-10-CM

## 2021-04-08 DIAGNOSIS — E78.00 PURE HYPERCHOLESTEROLEMIA: ICD-10-CM

## 2021-04-08 DIAGNOSIS — Z51.81 THERAPEUTIC DRUG MONITORING: Primary | ICD-10-CM

## 2021-04-08 DIAGNOSIS — F41.1 GENERALIZED ANXIETY DISORDER: ICD-10-CM

## 2021-04-08 PROCEDURE — 3074F SYST BP LT 130 MM HG: CPT | Performed by: NURSE PRACTITIONER

## 2021-04-08 PROCEDURE — 80061 LIPID PANEL: CPT | Performed by: FAMILY MEDICINE

## 2021-04-08 PROCEDURE — 3008F BODY MASS INDEX DOCD: CPT | Performed by: NURSE PRACTITIONER

## 2021-04-08 PROCEDURE — 3078F DIAST BP <80 MM HG: CPT | Performed by: NURSE PRACTITIONER

## 2021-04-08 PROCEDURE — 36415 COLL VENOUS BLD VENIPUNCTURE: CPT | Performed by: FAMILY MEDICINE

## 2021-04-08 PROCEDURE — 99214 OFFICE O/P EST MOD 30 MIN: CPT | Performed by: NURSE PRACTITIONER

## 2021-04-08 RX ORDER — TOPIRAMATE 50 MG/1
50 TABLET, FILM COATED ORAL 2 TIMES DAILY
Qty: 60 TABLET | Refills: 1 | Status: SHIPPED | OUTPATIENT
Start: 2021-04-08 | End: 2021-05-08

## 2021-04-08 NOTE — PATIENT INSTRUCTIONS
We are here to support you with weight loss, but please remember that you still need your primary care provider for your routine health maintenance.       PLAN:  Will increase topamax 50mg bid  Check out intermittent fasting- dr. Crawford Days 16:8  Follow up with milton number per day)                   ** Daily INPUT> Look at nutrition section-- \"nutrients\" and it will break down your macros for the day (ie. Protein, carbs, fibers, sugars and fats). Try to stay within these numbers daily     2.  \"7 minute workout\" to

## 2021-04-08 NOTE — PROGRESS NOTES
HISTORY OF PRESENT ILLNESS  Patient presents with:  Weight Check: down 4, pt states PCP suggested increase topiramate.  Pt states she no longer has a desire to drink soda water or any diet sodas    Soumya Dias is a 44year old female here for foll rashes, no suspicious lesions  HEENT: conjunctiva pink, sclera non icteric, PERRLA  NECK: supple, no adenopathy  LUNGS: CTA in all fields, breathing non labored  CARDIO: RRR without murmur  GI: +BS, NT/ND, no masses or HSM  EXTREMITIES: no cyanosis, no clu nightly., Disp: 90 tablet, Rfl: 0  ALPRAZolam 0.25 MG Oral Tab, Take 1 tablet (0.25 mg total) by mouth nightly as needed for Sleep or Anxiety. , Disp: 30 tablet, Rfl: 0  Cholecalciferol (VITAMIN D3) 50 MCG (2000 UT) Oral Tab, Take 1 tablet by mouth daily. , including attention to nutrition, exercise and behavior/stress management for success. See patient instruction below for more details.   · Discussed strategies to overcome barriers to successful weight loss and weight maintenance  · FITTE: ACSM recommendati good night of sleep  7. Try to decrease stress in life     Please download apps:  1.  \"My Fitness Pal\" (other option is Lose it)) to help you to monitor daily dietary intake and you will be able to see if you are eating the right amount of calories, prote

## 2021-04-10 DIAGNOSIS — G25.81 RESTLESS LEG SYNDROME: ICD-10-CM

## 2021-04-10 DIAGNOSIS — E66.9 OBESITY WITH BODY MASS INDEX (BMI) OF 30.0 TO 39.9: ICD-10-CM

## 2021-04-10 DIAGNOSIS — Z51.81 THERAPEUTIC DRUG MONITORING: ICD-10-CM

## 2021-04-10 RX ORDER — TOPIRAMATE 50 MG/1
50 TABLET, FILM COATED ORAL 2 TIMES DAILY
Qty: 60 TABLET | Refills: 1 | Status: CANCELLED | OUTPATIENT
Start: 2021-04-10 | End: 2021-05-10

## 2021-04-12 RX ORDER — VALACYCLOVIR HYDROCHLORIDE 1 G/1
2 TABLET, FILM COATED ORAL EVERY 12 HOURS SCHEDULED
Qty: 4 TABLET | Refills: 3 | OUTPATIENT
Start: 2021-04-12

## 2021-04-12 RX ORDER — TOPIRAMATE 25 MG/1
TABLET ORAL
Qty: 60 TABLET | Refills: 0 | OUTPATIENT
Start: 2021-04-12

## 2021-04-12 NOTE — TELEPHONE ENCOUNTER
Pt requesting refill of   Requested Prescriptions     Pending Prescriptions Disp Refills   • valACYclovir HCl 1 G Oral Tab 4 tablet 3     Sig: Take 2 tablets (2,000 mg total) by mouth every 12 (twelve) hours.        Passed protocol, refill denied - refill

## 2021-04-12 NOTE — TELEPHONE ENCOUNTER
Requesting topiramate 25 mg  LOV: 4/8/21  RTC: one month  Last Relevant Labs: na  Filled: DENIED THIS DOSE SINCE 50 MG WAS SENT ON 4/8/21 TO [facility]    Future Appointments   Date Time Provider Jhonny Meraz   5/21/2021 11:40 AM PARMJIT Christianson

## 2021-05-03 DIAGNOSIS — E78.00 PURE HYPERCHOLESTEROLEMIA: ICD-10-CM

## 2021-05-04 RX ORDER — ATORVASTATIN CALCIUM 40 MG/1
40 TABLET, FILM COATED ORAL NIGHTLY
Qty: 90 TABLET | Refills: 0 | Status: SHIPPED | OUTPATIENT
Start: 2021-05-04 | End: 2021-08-04

## 2021-05-04 RX ORDER — ATORVASTATIN CALCIUM 40 MG/1
TABLET, FILM COATED ORAL
Qty: 90 TABLET | Refills: 0 | OUTPATIENT
Start: 2021-05-04

## 2021-05-04 NOTE — TELEPHONE ENCOUNTER
Pt requesting refill of   Requested Prescriptions     Signed Prescriptions Disp Refills   • atorvastatin 40 MG Oral Tab 90 tablet 0     Sig: Take 1 tablet (40 mg total) by mouth nightly.      Authorizing Provider: Kaylynn Krishnan     Ordering User: Daniel Helms

## 2021-05-21 ENCOUNTER — PATIENT MESSAGE (OUTPATIENT)
Dept: FAMILY MEDICINE CLINIC | Facility: CLINIC | Age: 51
End: 2021-05-21

## 2021-05-21 ENCOUNTER — TELEMEDICINE (OUTPATIENT)
Dept: INTERNAL MEDICINE CLINIC | Facility: CLINIC | Age: 51
End: 2021-05-21

## 2021-05-21 VITALS — WEIGHT: 186 LBS | BODY MASS INDEX: 29 KG/M2

## 2021-05-21 DIAGNOSIS — F41.1 GENERALIZED ANXIETY DISORDER: ICD-10-CM

## 2021-05-21 DIAGNOSIS — E78.00 PURE HYPERCHOLESTEROLEMIA: ICD-10-CM

## 2021-05-21 DIAGNOSIS — Z51.81 THERAPEUTIC DRUG MONITORING: Primary | ICD-10-CM

## 2021-05-21 DIAGNOSIS — I10 ESSENTIAL HYPERTENSION, BENIGN: ICD-10-CM

## 2021-05-21 DIAGNOSIS — E66.9 OBESITY WITH BODY MASS INDEX (BMI) OF 30.0 TO 39.9: ICD-10-CM

## 2021-05-21 DIAGNOSIS — Z12.11 SCREEN FOR COLON CANCER: Primary | ICD-10-CM

## 2021-05-21 PROCEDURE — 99213 OFFICE O/P EST LOW 20 MIN: CPT | Performed by: NURSE PRACTITIONER

## 2021-05-21 RX ORDER — TOPIRAMATE 50 MG/1
50 TABLET, FILM COATED ORAL 2 TIMES DAILY
Qty: 60 TABLET | Refills: 1 | Status: SHIPPED | OUTPATIENT
Start: 2021-05-21 | End: 2021-06-20

## 2021-05-21 NOTE — TELEPHONE ENCOUNTER
From: Nicole Dang  To: Tita Goodman DO  Sent: 5/21/2021 1:41 PM CDT  Subject: Other    Hi Dr. Isaiah Bernabe.   I had to cancel my colonoscopy because my insurance was going to charge me $1800 out of pocket and a possible $4k more on top of that to me

## 2021-05-21 NOTE — PATIENT INSTRUCTIONS
We are here to support you with weight loss, but please remember that you still need your primary care provider for your routine health maintenance.       PLAN:  Will continue with topamax 50mg   Follow up with me in 1-2 month  Schedule follow up appointmen INPUT> Look at nutrition section-- \"nutrients\" and it will break down your macros for the day (ie. Protein, carbs, fibers, sugars and fats). Try to stay within these numbers daily     2.  \"7 minute workout\" to help with exercise/activity which takes 7 m

## 2021-05-21 NOTE — PROGRESS NOTES
[facility] WEIGHT MANAGEMENT VIRTUAL ENCOUNTER     Tracy Rosen verbally consents to a Virtual/Telephone Check-In service on 05/21/21   Patient understands and accepts financial responsibility for any deductible, co-insurance and/or co-pays a comfortably   SKIN: warm, pink, dry without rashes to exposed area   EYES: conjunctiva pink  HEENT: atraumatic, normocephalic  LUNGS: normal work of breathing, non labored  CARDIO: normal work, no exertion  EXTREMITIES: no cyanosis, no clubbing, no edema tablet (20 mg total) by mouth daily. , Disp: 90 tablet, Rfl: 1  buPROPion HCl ER, XL, 150 MG Oral Tablet 24 Hr, Take 1 tablet (150 mg total) by mouth daily. , Disp: 90 tablet, Rfl: 1  rOPINIRole HCl 1 MG Oral Tab, Take 1 tablet (1 mg total) by mouth nightly. to eat breakfast daily/ regular protein intake  · Follow up with dietitian and psychologist as recommended. · Discussed the role of sleep and stress in weight management.   · Counseled on comprehensive weight loss plan including attention to nutrition, exe

## 2021-05-21 NOTE — TELEPHONE ENCOUNTER
And should complete FIT testing which is a sampling of her stool    Please mail her the cards and will need to complete annually.     Please inform

## 2021-06-12 DIAGNOSIS — F99 INSOMNIA DUE TO OTHER MENTAL DISORDER: ICD-10-CM

## 2021-06-12 DIAGNOSIS — F41.1 GENERALIZED ANXIETY DISORDER: ICD-10-CM

## 2021-06-12 DIAGNOSIS — F51.05 INSOMNIA DUE TO OTHER MENTAL DISORDER: ICD-10-CM

## 2021-06-14 ENCOUNTER — PATIENT MESSAGE (OUTPATIENT)
Dept: NEUROLOGY | Facility: CLINIC | Age: 51
End: 2021-06-14

## 2021-06-14 RX ORDER — ESCITALOPRAM OXALATE 20 MG/1
20 TABLET ORAL DAILY
Qty: 90 TABLET | Refills: 1 | OUTPATIENT
Start: 2021-06-14

## 2021-06-14 NOTE — TELEPHONE ENCOUNTER
Pt requesting refill of   Requested Prescriptions     Pending Prescriptions Disp Refills   • escitalopram 20 MG Oral Tab 90 tablet 1     Sig: Take 1 tablet (20 mg total) by mouth daily.         No protocol available    Last Time Medication was Filled:  4/5/

## 2021-06-15 NOTE — TELEPHONE ENCOUNTER
From: Blanka Briceno  To: Adrianne Corral DO  Sent: 6/14/2021 2:56 PM CDT  Subject: Visit Sae Ruggiero. I have a follow up appointment with you this Thursday however I have not had two of the three tests you asked for.

## 2021-06-25 ENCOUNTER — TELEMEDICINE (OUTPATIENT)
Dept: INTERNAL MEDICINE CLINIC | Facility: CLINIC | Age: 51
End: 2021-06-25

## 2021-06-25 VITALS — BODY MASS INDEX: 29 KG/M2 | WEIGHT: 184 LBS

## 2021-06-25 DIAGNOSIS — F41.1 GENERALIZED ANXIETY DISORDER: ICD-10-CM

## 2021-06-25 DIAGNOSIS — Z51.81 THERAPEUTIC DRUG MONITORING: Primary | ICD-10-CM

## 2021-06-25 DIAGNOSIS — F10.11 HISTORY OF ALCOHOL ABUSE: ICD-10-CM

## 2021-06-25 DIAGNOSIS — E66.9 OBESITY WITH BODY MASS INDEX (BMI) OF 30.0 TO 39.9: ICD-10-CM

## 2021-06-25 DIAGNOSIS — E78.00 PURE HYPERCHOLESTEROLEMIA: ICD-10-CM

## 2021-06-25 DIAGNOSIS — I10 ESSENTIAL HYPERTENSION, BENIGN: ICD-10-CM

## 2021-06-25 PROCEDURE — 99213 OFFICE O/P EST LOW 20 MIN: CPT | Performed by: NURSE PRACTITIONER

## 2021-06-25 NOTE — PATIENT INSTRUCTIONS
We are here to support you with weight loss, but please remember that you still need your primary care provider for your routine health maintenance.       PLAN:  Will continue with topamax 50mg, but if you want to increase- can do 2 tablets= 100mg (in the A Activity level: not very active (can't count exercise towards calorie number per day)                   ** Daily INPUT> Look at nutrition section-- \"nutrients\" and it will break down your macros for the day (ie. Protein, carbs, fibers, sugars and fats).

## 2021-06-25 NOTE — PROGRESS NOTES
[facility] WEIGHT MANAGEMENT VIRTUAL ENCOUNTER     John Sellers verbally consents to a Virtual/Telephone Check-In service on 06/25/21   Patient understands and accepts financial responsibility for any deductible, co-insurance and/or co-pays a distress, speaking in full sentences comfortably   SKIN: warm, pink, dry without rashes to exposed area   EYES: conjunctiva pink  HEENT: atraumatic, normocephalic  LUNGS: normal work of breathing, non labored  CARDIO: normal work, no exertion  EXTREMITIES: 3  escitalopram 20 MG Oral Tab, Take 1 tablet (20 mg total) by mouth daily. , Disp: 90 tablet, Rfl: 1  buPROPion HCl ER, XL, 150 MG Oral Tablet 24 Hr, Take 1 tablet (150 mg total) by mouth daily. , Disp: 90 tablet, Rfl: 1  rOPINIRole HCl 1 MG Oral Tab, Take and pulse at home/ given parameters to review and contact provider. · Nutrition: low carb diet/ recommended to eat breakfast daily/ regular protein intake  · Follow up with dietitian and psychologist as recommended.   · Discussed the role of sleep and stre

## 2021-07-05 ENCOUNTER — LAB ENCOUNTER (OUTPATIENT)
Dept: LAB | Facility: HOSPITAL | Age: 51
End: 2021-07-05
Attending: FAMILY MEDICINE
Payer: COMMERCIAL

## 2021-07-05 DIAGNOSIS — Z12.11 SCREEN FOR COLON CANCER: ICD-10-CM

## 2021-07-05 PROCEDURE — 82274 ASSAY TEST FOR BLOOD FECAL: CPT

## 2021-07-08 ENCOUNTER — PATIENT MESSAGE (OUTPATIENT)
Dept: INTERNAL MEDICINE CLINIC | Facility: CLINIC | Age: 51
End: 2021-07-08

## 2021-07-08 NOTE — TELEPHONE ENCOUNTER
Patient is asking to increase her topamax to 100 mg from 50 mg bid. As discussed. I have pended new dose with a refill if okay?

## 2021-07-08 NOTE — TELEPHONE ENCOUNTER
From: Jack Golden  To: PARMJIT Mccain  Sent: 7/8/2021 11:45 AM CDT  Subject: Prescription Question    Hi Dr. Nickolas Slade.   When we talked a couple of weeks ago you asked me to let you know if I'd like to go up to a 100mg prescription, and I w

## 2021-07-09 LAB — HEMOCCULT STL QL: NEGATIVE

## 2021-07-09 RX ORDER — TOPIRAMATE 100 MG/1
100 TABLET, FILM COATED ORAL 2 TIMES DAILY
Qty: 60 TABLET | Refills: 1 | Status: SHIPPED | OUTPATIENT
Start: 2021-07-09 | End: 2021-08-08

## 2021-07-12 ENCOUNTER — TELEPHONE (OUTPATIENT)
Dept: FAMILY MEDICINE CLINIC | Facility: CLINIC | Age: 51
End: 2021-07-12

## 2021-07-12 NOTE — TELEPHONE ENCOUNTER
Occult blood, fecal results     LMOM to return call to the office.  Provided pt office phone [phone]

## 2021-07-12 NOTE — TELEPHONE ENCOUNTER
----- Message from Derrek Livingston MD sent at 7/12/2021  8:04 AM CDT -----  Results reviewed. Screening negative, repeat in 1 year please inform patient.

## 2021-07-15 NOTE — TELEPHONE ENCOUNTER
Spoke to pt and let them know results and recommendation per Dr Wagner Po covering for Dr Silvaan Scott. Patient voiced understanding.

## 2021-07-27 ENCOUNTER — TELEPHONE (OUTPATIENT)
Dept: FAMILY MEDICINE CLINIC | Facility: CLINIC | Age: 51
End: 2021-07-27

## 2021-07-27 NOTE — TELEPHONE ENCOUNTER
Detailed message left on pt's voicemail letting her know referral for her to see GI Dr Ashkan Elena was entered as screening for colon cancer, which is considered preventative.  Call back with any other questions

## 2021-07-27 NOTE — TELEPHONE ENCOUNTER
Pt wants to know if order for colonoscopy was a preventative or diagnostic for her insurance company

## 2021-08-03 DIAGNOSIS — E78.00 PURE HYPERCHOLESTEROLEMIA: ICD-10-CM

## 2021-08-04 RX ORDER — ATORVASTATIN CALCIUM 40 MG/1
TABLET, FILM COATED ORAL
Qty: 90 TABLET | Refills: 0 | Status: SHIPPED | OUTPATIENT
Start: 2021-08-04 | End: 2021-11-12

## 2021-08-04 NOTE — TELEPHONE ENCOUNTER
Pt requesting refill of   Requested Prescriptions     Pending Prescriptions Disp Refills   • ATORVASTATIN 40 MG Oral Tab [Pharmacy Med Name: Atorvastatin Calcium Oral Tablet 40 MG] 90 tablet 0     Sig: TAKE ONE TABLET BY MOUTH EVERY NIGHT     Passed prot

## 2021-10-01 ENCOUNTER — TELEMEDICINE (OUTPATIENT)
Dept: INTERNAL MEDICINE CLINIC | Facility: CLINIC | Age: 51
End: 2021-10-01

## 2021-10-01 DIAGNOSIS — I10 ESSENTIAL HYPERTENSION, BENIGN: ICD-10-CM

## 2021-10-01 DIAGNOSIS — E66.9 OBESITY WITH BODY MASS INDEX (BMI) OF 30.0 TO 39.9: ICD-10-CM

## 2021-10-01 DIAGNOSIS — F41.1 GENERALIZED ANXIETY DISORDER: ICD-10-CM

## 2021-10-01 DIAGNOSIS — E78.00 PURE HYPERCHOLESTEROLEMIA: ICD-10-CM

## 2021-10-01 DIAGNOSIS — Z51.81 THERAPEUTIC DRUG MONITORING: Primary | ICD-10-CM

## 2021-10-01 PROCEDURE — 99213 OFFICE O/P EST LOW 20 MIN: CPT | Performed by: NURSE PRACTITIONER

## 2021-10-01 RX ORDER — TOPIRAMATE 100 MG/1
100 TABLET, FILM COATED ORAL 2 TIMES DAILY
Qty: 180 TABLET | Refills: 0 | Status: SHIPPED | OUTPATIENT
Start: 2021-10-01 | End: 2021-10-31

## 2021-10-01 NOTE — PROGRESS NOTES
[facility] WEIGHT MANAGEMENT VIRTUAL ENCOUNTER     Willy Avendaño verbally consents to a Virtual/Telephone Check-In service on 10/01/21   Patient understands and accepts financial responsibility for any deductible, co-insurance and/or co-pays a distress, speaking in full sentences comfortably   SKIN: warm, pink, dry without rashes to exposed area   EYES: conjunctiva pink  HEENT: atraumatic, normocephalic  LUNGS: normal work of breathing, non labored  CARDIO: normal work, no exertion  EXTREMITIES: 20 MG Oral Tab, Take 1 tablet (20 mg total) by mouth daily. , Disp: 90 tablet, Rfl: 1  buPROPion HCl ER, XL, 150 MG Oral Tablet 24 Hr, Take 1 tablet (150 mg total) by mouth daily. , Disp: 90 tablet, Rfl: 1  rOPINIRole HCl 1 MG Oral Tab, Take 1 tablet (1 mg t as recommended. · Discussed the role of sleep and stress in weight management. · Counseled on comprehensive weight loss plan including attention to nutrition, exercise and behavior/stress management for success.  See patient instruction below for more det

## 2021-10-01 NOTE — PATIENT INSTRUCTIONS
We are here to support you with weight loss, but please remember that you still need your primary care provider for your routine health maintenance.       PLAN:  Will continue with topamax 100mg bid  Follow up with me in 12 weeks  Schedule follow up appoint INPUT> Look at nutrition section-- \"nutrients\" and it will break down your macros for the day (ie. Protein, carbs, fibers, sugars and fats). Try to stay within these numbers daily     2.  \"7 minute workout\" to help with exercise/activity which takes 7 m

## 2021-10-27 DIAGNOSIS — E78.00 PURE HYPERCHOLESTEROLEMIA: ICD-10-CM

## 2021-10-28 RX ORDER — ATORVASTATIN CALCIUM 40 MG/1
TABLET, FILM COATED ORAL
Qty: 90 TABLET | Refills: 0 | OUTPATIENT
Start: 2021-10-28

## 2021-10-28 NOTE — TELEPHONE ENCOUNTER
Refill requested too soon:     Pt requesting refill of   Requested Prescriptions     Refused Prescriptions Disp Refills   • ATORVASTATIN 40 MG Oral Tab [Pharmacy Med Name: Atorvastatin Calcium Oral Tablet 40 MG] 90 tablet 0     Sig: TAKE ONE TABLET BY MOUT

## 2021-11-02 DIAGNOSIS — G25.81 RESTLESS LEG SYNDROME: ICD-10-CM

## 2021-11-03 RX ORDER — ROPINIROLE 1 MG/1
1 TABLET, FILM COATED ORAL NIGHTLY
Qty: 90 TABLET | Refills: 0 | Status: SHIPPED | OUTPATIENT
Start: 2021-11-03 | End: 2021-11-29

## 2021-11-12 DIAGNOSIS — E78.00 PURE HYPERCHOLESTEROLEMIA: ICD-10-CM

## 2021-11-12 RX ORDER — BUPROPION HYDROCHLORIDE 150 MG/1
TABLET ORAL
Qty: 90 TABLET | Refills: 0 | Status: SHIPPED | OUTPATIENT
Start: 2021-11-12 | End: 2021-11-29

## 2021-11-12 RX ORDER — ATORVASTATIN CALCIUM 40 MG/1
TABLET, FILM COATED ORAL
Qty: 90 TABLET | Refills: 0 | Status: SHIPPED | OUTPATIENT
Start: 2021-11-12 | End: 2022-02-07

## 2021-11-12 NOTE — TELEPHONE ENCOUNTER
Refill Passed Protocol:     Pt requesting refill of   Requested Prescriptions     Pending Prescriptions Disp Refills   • BUPROPION 150 MG Oral Tablet 24 Hr [Pharmacy Med Name: buPROPion HCl ER (XL) Oral Tablet Extended Release 24 Hour 150 MG] 90 tablet 0

## 2021-11-29 ENCOUNTER — OFFICE VISIT (OUTPATIENT)
Dept: FAMILY MEDICINE CLINIC | Facility: CLINIC | Age: 51
End: 2021-11-29
Payer: COMMERCIAL

## 2021-11-29 VITALS
TEMPERATURE: 97 F | OXYGEN SATURATION: 96 % | HEIGHT: 67 IN | RESPIRATION RATE: 16 BRPM | WEIGHT: 183 LBS | DIASTOLIC BLOOD PRESSURE: 76 MMHG | BODY MASS INDEX: 28.72 KG/M2 | SYSTOLIC BLOOD PRESSURE: 126 MMHG | HEART RATE: 76 BPM

## 2021-11-29 DIAGNOSIS — F99 INSOMNIA DUE TO OTHER MENTAL DISORDER: ICD-10-CM

## 2021-11-29 DIAGNOSIS — F51.05 INSOMNIA DUE TO OTHER MENTAL DISORDER: ICD-10-CM

## 2021-11-29 DIAGNOSIS — F41.1 GENERALIZED ANXIETY DISORDER: ICD-10-CM

## 2021-11-29 DIAGNOSIS — E78.00 PURE HYPERCHOLESTEROLEMIA: Primary | ICD-10-CM

## 2021-11-29 DIAGNOSIS — Z12.11 SCREEN FOR COLON CANCER: ICD-10-CM

## 2021-11-29 DIAGNOSIS — Z12.31 BREAST CANCER SCREENING BY MAMMOGRAM: ICD-10-CM

## 2021-11-29 DIAGNOSIS — G25.81 RESTLESS LEG SYNDROME: ICD-10-CM

## 2021-11-29 PROBLEM — M67.449 MUCOUS CYST OF FINGER: Status: RESOLVED | Noted: 2018-07-25 | Resolved: 2021-11-29

## 2021-11-29 PROBLEM — G47.00 FREQUENT NOCTURNAL AWAKENING: Status: RESOLVED | Noted: 2020-10-02 | Resolved: 2021-11-29

## 2021-11-29 PROBLEM — Z00.00 HEALTH CARE MAINTENANCE: Status: RESOLVED | Noted: 2017-02-27 | Resolved: 2021-11-29

## 2021-11-29 PROCEDURE — 3008F BODY MASS INDEX DOCD: CPT | Performed by: FAMILY MEDICINE

## 2021-11-29 PROCEDURE — 3078F DIAST BP <80 MM HG: CPT | Performed by: FAMILY MEDICINE

## 2021-11-29 PROCEDURE — 3074F SYST BP LT 130 MM HG: CPT | Performed by: FAMILY MEDICINE

## 2021-11-29 PROCEDURE — 99214 OFFICE O/P EST MOD 30 MIN: CPT | Performed by: FAMILY MEDICINE

## 2021-11-29 RX ORDER — ROPINIROLE 1 MG/1
1 TABLET, FILM COATED ORAL NIGHTLY
Qty: 90 TABLET | Refills: 1 | Status: SHIPPED | OUTPATIENT
Start: 2021-11-29

## 2021-11-29 RX ORDER — BUPROPION HYDROCHLORIDE 150 MG/1
150 TABLET ORAL DAILY
Qty: 90 TABLET | Refills: 0 | Status: SHIPPED | OUTPATIENT
Start: 2021-11-29

## 2021-11-29 RX ORDER — TOPIRAMATE 100 MG/1
TABLET, FILM COATED ORAL
COMMUNITY
Start: 2021-11-26

## 2021-11-29 RX ORDER — ESCITALOPRAM OXALATE 20 MG/1
20 TABLET ORAL DAILY
Qty: 90 TABLET | Refills: 1 | Status: SHIPPED | OUTPATIENT
Start: 2021-11-29

## 2021-11-29 RX ORDER — ATORVASTATIN CALCIUM 40 MG/1
40 TABLET, FILM COATED ORAL NIGHTLY
Qty: 90 TABLET | Refills: 0 | Status: CANCELLED | OUTPATIENT
Start: 2021-11-29

## 2021-11-29 NOTE — PROGRESS NOTES
CHIEF COMPLAINT: Patient presents with:  Medication Follow-Up      HPI:     Serina Suarez is a 44year old female presents for medication monitoring. Pt presents for recheck of hyperlipidemia.  Patient reports taking medications as instructed, no Past Surgical History:   Procedure Laterality Date   • COLONOSCOPY  01/01/2014   • COLONOSCOPY  Tummy problems    Getting better   • HAND/FINGER SURGERY UNLISTED  2009   • OTHER SURGICAL HISTORY  Finger-pinky.  Lipoma removed      Family History   Adop elements reviewed from today and agreed except as otherwise stated in HPI.   PHYSICAL EXAM:   /76 (BP Location: Left arm, Patient Position: Sitting, Cuff Size: adult)   Pulse 76   Temp 97.1 °F (36.2 °C) (Temporal)   Resp 16   Ht 5' 7\" (1.702 m)   Wt 30-60min    3. Restless leg syndrome  -Continue rOPINIRole HCl 1 MG Oral Tab; Take 1 tablet (1 mg total) by mouth nightly. ONE HOUR BEFORE BEDTIME  Dispense: 90 tablet; Refill: 1  Controlled    4.  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

## 2021-11-29 NOTE — PATIENT INSTRUCTIONS
Understanding Restless Legs Syndrome  Are you ever annoyed by a creeping or itching feeling in your legs? Do you often feel an urge to move your legs while sitting or lying in bed? This can keep you from falling asleep at night.  You may then feel tired d Do  Symptoms of restless leg syndrome (RLS) can be treated. Together, you and your healthcare provider can work on your treatment plan. If needed, medicines may be prescribed. Also learn what you can do to ease your discomfort.  Good sleep habits and a heal Nighttime exercise may affect how well you sleep. Jean-Claude last reviewed this educational content on 3/1/2020  © 9041-1796 [employer]. All rights reserved. This information is not intended as a substitute for professional medical care.  Racquel Vidal vitamins and minerals, you may also need to take supplements. · Manage stress and learn ways to relax. Deep breathing techniques and visualization can help to relax your muscles and calm your mind. · Exercise regularly. It can help reduce stress.  Also, y

## 2021-12-03 ENCOUNTER — NURSE ONLY (OUTPATIENT)
Dept: FAMILY MEDICINE CLINIC | Facility: CLINIC | Age: 51
End: 2021-12-03
Payer: COMMERCIAL

## 2021-12-03 DIAGNOSIS — E78.00 PURE HYPERCHOLESTEROLEMIA: ICD-10-CM

## 2021-12-03 PROCEDURE — 36415 COLL VENOUS BLD VENIPUNCTURE: CPT | Performed by: FAMILY MEDICINE

## 2021-12-03 PROCEDURE — 80061 LIPID PANEL: CPT | Performed by: FAMILY MEDICINE

## 2021-12-03 PROCEDURE — 80053 COMPREHEN METABOLIC PANEL: CPT | Performed by: FAMILY MEDICINE

## 2021-12-03 NOTE — PROGRESS NOTES
Patient came in for draw of ordered fasting labs. Patient drawn out of R AC, x 1 attempt and tolerated well. 1 lt grn tube drawn.

## 2021-12-07 ENCOUNTER — LAB ENCOUNTER (OUTPATIENT)
Dept: LAB | Facility: HOSPITAL | Age: 51
End: 2021-12-07
Attending: INTERNAL MEDICINE
Payer: COMMERCIAL

## 2021-12-07 DIAGNOSIS — Z01.818 PRE-OP TESTING: ICD-10-CM

## 2021-12-10 PROBLEM — D12.3 BENIGN NEOPLASM OF TRANSVERSE COLON: Status: ACTIVE | Noted: 2021-12-10

## 2021-12-10 PROBLEM — D12.5 BENIGN NEOPLASM OF SIGMOID COLON: Status: ACTIVE | Noted: 2021-12-10

## 2021-12-10 PROBLEM — Z86.0101 HISTORY OF ADENOMATOUS POLYP OF COLON: Status: ACTIVE | Noted: 2021-12-10

## 2021-12-10 PROBLEM — Z86.010 HISTORY OF ADENOMATOUS POLYP OF COLON: Status: ACTIVE | Noted: 2021-12-10

## 2021-12-14 ENCOUNTER — OFFICE VISIT (OUTPATIENT)
Dept: INTERNAL MEDICINE CLINIC | Facility: CLINIC | Age: 51
End: 2021-12-14
Payer: COMMERCIAL

## 2021-12-14 VITALS
SYSTOLIC BLOOD PRESSURE: 122 MMHG | HEIGHT: 67 IN | RESPIRATION RATE: 16 BRPM | DIASTOLIC BLOOD PRESSURE: 80 MMHG | WEIGHT: 176 LBS | OXYGEN SATURATION: 96 % | BODY MASS INDEX: 27.62 KG/M2 | HEART RATE: 64 BPM

## 2021-12-14 DIAGNOSIS — E78.00 PURE HYPERCHOLESTEROLEMIA: ICD-10-CM

## 2021-12-14 DIAGNOSIS — E66.3 OVERWEIGHT (BMI 25.0-29.9): ICD-10-CM

## 2021-12-14 DIAGNOSIS — I10 ESSENTIAL HYPERTENSION, BENIGN: ICD-10-CM

## 2021-12-14 DIAGNOSIS — Z51.81 THERAPEUTIC DRUG MONITORING: Primary | ICD-10-CM

## 2021-12-14 PROCEDURE — 3008F BODY MASS INDEX DOCD: CPT | Performed by: NURSE PRACTITIONER

## 2021-12-14 PROCEDURE — 3074F SYST BP LT 130 MM HG: CPT | Performed by: NURSE PRACTITIONER

## 2021-12-14 PROCEDURE — 99214 OFFICE O/P EST MOD 30 MIN: CPT | Performed by: NURSE PRACTITIONER

## 2021-12-14 PROCEDURE — 3079F DIAST BP 80-89 MM HG: CPT | Performed by: NURSE PRACTITIONER

## 2021-12-14 RX ORDER — TOPIRAMATE 100 MG/1
TABLET, FILM COATED ORAL
Refills: 0 | Status: CANCELLED | OUTPATIENT
Start: 2021-12-14

## 2021-12-14 NOTE — PATIENT INSTRUCTIONS
We are here to support you with weight loss, but please remember that you still need your primary care provider for your routine health maintenance. PLAN:  Will continue with topamax 100mg bid   Try to add in strength training- ie.  Yoga, barre, piliat towards calorie number per day)                   ** Daily INPUT> Look at nutrition section-- \"nutrients\" and it will break down your macros for the day (ie. Protein, carbs, fibers, sugars and fats). Try to stay within these numbers daily     2.  \"7 kacie

## 2021-12-14 NOTE — PROGRESS NOTES
HISTORY OF PRESENT ILLNESS  Patient presents with:  Weight Check: down 3    Vipul Palacios is a 46year old female here for follow up with medical weight loss program for the treatment of overweight, obesity, or morbid obesity.      Down 3 lbs  Compl labored  CARDIO: RRR without murmur  GI: +BS, NT/ND, no masses or HSM  EXTREMITIES: no cyanosis, no clubbing, no edema    Lab Results   Component Value Date     (H) 12/03/2021    BUN 17 12/03/2021    BUNCREA 23.9 (H) 09/17/2020    CREATSERUM 0.84 12 Loss:  Initial consult: #192 lbs on 2/2021  Weight Calculations  Initial Weight: 192 lbs  Initial Weight Date: 02/04/21  Today's Weight: 176 lbs  5% Goal: 9.6  10% Goal: 19.2  Total Weight Loss: 16 lbs  Total weight loss: Down 3 lbs total, Net loss 16 lbs

## 2021-12-24 NOTE — TELEPHONE ENCOUNTER
Refills may have been denied either were duplicates or perhaps is due for appointment. Patient was due for medication monitoring and follow-up. Refills have been sent but needs virtual visit or in person either are okay.   Thank you [facility]requent PVCs

## 2022-01-12 ENCOUNTER — PATIENT MESSAGE (OUTPATIENT)
Dept: FAMILY MEDICINE CLINIC | Facility: CLINIC | Age: 52
End: 2022-01-12

## 2022-01-12 DIAGNOSIS — G25.81 RESTLESS LEG SYNDROME: ICD-10-CM

## 2022-01-12 RX ORDER — VALACYCLOVIR HYDROCHLORIDE 1 G/1
2000 TABLET, FILM COATED ORAL EVERY 12 HOURS SCHEDULED
Qty: 4 TABLET | Refills: 3 | Status: SHIPPED | OUTPATIENT
Start: 2022-01-12

## 2022-01-12 NOTE — TELEPHONE ENCOUNTER
Refill Passed Protocol:     Pt requesting refill of   Requested Prescriptions     Pending Prescriptions Disp Refills   • valACYclovir 1 G Oral Tab 4 tablet 3     Sig: Take 2 tablets (2,000 mg total) by mouth every 12 (twelve) hours.          Refill was appr

## 2022-01-12 NOTE — TELEPHONE ENCOUNTER
From: Jeronimo Bonilla  To: Juanita Acevedo DO  Sent: 1/12/2022 12:20 PM CST  Subject: Otilia acyclovir Hcl    Hi! I’m in need of a refill dosage of my Otilia acyclovir and for some reason I don’t see it on my list of medications.  Is there any way you could

## 2022-02-07 RX ORDER — ATORVASTATIN CALCIUM 40 MG/1
TABLET, FILM COATED ORAL
Qty: 90 TABLET | Refills: 0 | Status: SHIPPED | OUTPATIENT
Start: 2022-02-07

## 2022-02-25 ENCOUNTER — TELEMEDICINE (OUTPATIENT)
Dept: INTERNAL MEDICINE CLINIC | Facility: CLINIC | Age: 52
End: 2022-02-25

## 2022-02-25 DIAGNOSIS — Z51.81 THERAPEUTIC DRUG MONITORING: Primary | ICD-10-CM

## 2022-02-25 DIAGNOSIS — I10 ESSENTIAL HYPERTENSION, BENIGN: ICD-10-CM

## 2022-02-25 DIAGNOSIS — E78.00 PURE HYPERCHOLESTEROLEMIA: ICD-10-CM

## 2022-02-25 DIAGNOSIS — E66.3 OVERWEIGHT (BMI 25.0-29.9): ICD-10-CM

## 2022-02-25 PROCEDURE — 99213 OFFICE O/P EST LOW 20 MIN: CPT | Performed by: NURSE PRACTITIONER

## 2022-02-25 RX ORDER — TOPIRAMATE 100 MG/1
100 TABLET, FILM COATED ORAL DAILY
Qty: 180 TABLET | Refills: 1 | Status: SHIPPED | OUTPATIENT
Start: 2022-02-25

## 2022-02-25 NOTE — PATIENT INSTRUCTIONS
We are here to support you with weight loss, but please remember that you still need your primary care provider for your routine health maintenance. PLAN:  Will continue with topamax 100mg bid  Try out yoga  Can think about trying out interm. Fasting (dr. Emeka Street)  Follow up with me in 8-12 weeks  Schedule follow up appointments: Juan Jose Lemons (dietitian) or Jerrell Ruiz (presurgery dietitian)   Check for insurance coverage for dietitian and labwork prior to scheduling appointment. Please try to work on the following dietary changes:  1. Goals: Aim for 20-30 grams of protein/ meal  i. Aim for 100 grams of carbohydrates/day  ii. Eat 4-6 vegetables/day  iii. Avoid skipping meals- eat every 4-5 hours  iv. Aim for 3 meals/day  2. Drink lots of water and cut down on soda/juice consumption if soda/juice drinker  3. Focus on protein: (15-30 grams with each meal) ie. greek yogurt, cottage cheese, string cheese, hard boiled eggs  4. Healthy snacks: peanut butter and apples, hummus and carrots, berries, nuts (1/4 cup), tuna and crackers                 Protein Shakes: Premier protein or Core Power                Protein Bars: Rx Bars, Oatmega, Power Crunch                 [place] balanced breaks (cheese and nuts)- without chocolate  5. Reduce carbohydrates which includes sweets as well as rice, pasta, potatoes, bread, corn and instead choose whole grain options or more protein or vegetables (4-6 servings of vegetables per day)  6. Get a good night of sleep  7. Try to decrease stress in life     Please download apps:  1. \"My Fitness Pal\" (other option is Lose it)) to help you to monitor daily dietary intake and you will be able to see if you are eating the right amount of calories, protein, carbs                With My Fitness Pal-->When you set-up the eva or need to adjust settings:                Goals should include:                  Lose 1.5-2 lbs per week                Activity level: not very active (can't count exercise towards calorie number per day)                   ** Daily INPUT> Look at nutrition section-- \"nutrients\" and it will break down your macros for the day (ie. Protein, carbs, fibers, sugars and fats). Try to stay within these numbers daily     2. \"7 minute workout\" to help with exercise/activity which takes 7 minutes of your day and that you can do at home! 3. \"Calm\" or \"Headspace\" which helps with mindfulness, meditation, clarity, sleep, and kell to your daily life. 4. [employer] blog for healthy recipe ideas  5. [employer] for low carb resources    HIGH PROTEIN SNACK IDEAS  -cottage cheese  -plain yogurt  -kefir  -hard-boiled eggs  -natural cheeses  -nuts (measure portion size)   -unsweetened nut butters  -dried edamame   -alfonso seeds soaked in water or almond milk  -soy nuts  -cured meats (monitor for sodium issues)   -hummus with vegetables  -bean dip with vegetables     FRUIT  Low carb fruit options   Raspberries: Half a cup (60 grams) contains 3 grams of carbs. Blackberries: Half a cup (70 grams) contains 4 grams of carbs. Strawberries: Half a cup (100 grams) contains 6 grams of carbs. Blueberries: Half a cup (50 grams) contains 6 grams of carbs. Plum: One medium-sized (80 grams) contains 6 grams of carbs.      VEGETABLES  Low carb vegetables

## 2022-03-24 ENCOUNTER — HOSPITAL ENCOUNTER (OUTPATIENT)
Dept: MAMMOGRAPHY | Facility: HOSPITAL | Age: 52
Discharge: HOME | End: 2022-03-24
Attending: FAMILY MEDICINE
Payer: COMMERCIAL

## 2022-03-24 DIAGNOSIS — Z12.31 BREAST CANCER SCREENING BY MAMMOGRAM: ICD-10-CM

## 2022-03-24 PROCEDURE — 77063 BREAST TOMOSYNTHESIS BI: CPT | Performed by: FAMILY MEDICINE

## 2022-03-24 PROCEDURE — 77067 SCR MAMMO BI INCL CAD: CPT | Performed by: FAMILY MEDICINE

## 2022-05-06 RX ORDER — ATORVASTATIN CALCIUM 40 MG/1
TABLET, FILM COATED ORAL
Qty: 90 TABLET | Refills: 0 | Status: SHIPPED | OUTPATIENT
Start: 2022-05-06

## 2022-05-17 RX ORDER — BUPROPION HYDROCHLORIDE 150 MG/1
TABLET ORAL
Qty: 90 TABLET | Refills: 0 | Status: SHIPPED | OUTPATIENT
Start: 2022-05-17

## 2022-05-24 ENCOUNTER — OFFICE VISIT (OUTPATIENT)
Dept: FAMILY MEDICINE CLINIC | Facility: CLINIC | Age: 52
End: 2022-05-24
Payer: COMMERCIAL

## 2022-05-24 VITALS
RESPIRATION RATE: 16 BRPM | DIASTOLIC BLOOD PRESSURE: 64 MMHG | BODY MASS INDEX: 27.88 KG/M2 | OXYGEN SATURATION: 98 % | HEART RATE: 71 BPM | HEIGHT: 67 IN | WEIGHT: 177.63 LBS | TEMPERATURE: 97 F | SYSTOLIC BLOOD PRESSURE: 118 MMHG

## 2022-05-24 DIAGNOSIS — M25.561 CHRONIC PAIN OF BOTH KNEES: ICD-10-CM

## 2022-05-24 DIAGNOSIS — Z13.0 SCREENING FOR ENDOCRINE, NUTRITIONAL, METABOLIC AND IMMUNITY DISORDER: ICD-10-CM

## 2022-05-24 DIAGNOSIS — F41.9 ANXIETY AND DEPRESSION: ICD-10-CM

## 2022-05-24 DIAGNOSIS — F32.A ANXIETY AND DEPRESSION: ICD-10-CM

## 2022-05-24 DIAGNOSIS — M22.41 CHONDROMALACIA OF BOTH PATELLAE: ICD-10-CM

## 2022-05-24 DIAGNOSIS — Z13.21 SCREENING FOR ENDOCRINE, NUTRITIONAL, METABOLIC AND IMMUNITY DISORDER: ICD-10-CM

## 2022-05-24 DIAGNOSIS — H69.82 ACUTE DYSFUNCTION OF LEFT EUSTACHIAN TUBE: ICD-10-CM

## 2022-05-24 DIAGNOSIS — G89.29 CHRONIC PAIN OF BOTH KNEES: ICD-10-CM

## 2022-05-24 DIAGNOSIS — Z12.31 SCREENING MAMMOGRAM, ENCOUNTER FOR: ICD-10-CM

## 2022-05-24 DIAGNOSIS — Z12.11 SCREEN FOR COLON CANCER: ICD-10-CM

## 2022-05-24 DIAGNOSIS — M22.42 CHONDROMALACIA OF BOTH PATELLAE: ICD-10-CM

## 2022-05-24 DIAGNOSIS — G25.81 RESTLESS LEG SYNDROME: ICD-10-CM

## 2022-05-24 DIAGNOSIS — Z00.00 ANNUAL PHYSICAL EXAM: Primary | ICD-10-CM

## 2022-05-24 DIAGNOSIS — E78.00 PURE HYPERCHOLESTEROLEMIA: ICD-10-CM

## 2022-05-24 DIAGNOSIS — M25.562 CHRONIC PAIN OF BOTH KNEES: ICD-10-CM

## 2022-05-24 DIAGNOSIS — Z13.228 SCREENING FOR ENDOCRINE, NUTRITIONAL, METABOLIC AND IMMUNITY DISORDER: ICD-10-CM

## 2022-05-24 DIAGNOSIS — Z13.29 SCREENING FOR ENDOCRINE, NUTRITIONAL, METABOLIC AND IMMUNITY DISORDER: ICD-10-CM

## 2022-05-24 DIAGNOSIS — F51.04 PSYCHOPHYSIOLOGICAL INSOMNIA: ICD-10-CM

## 2022-05-24 PROBLEM — H69.92 ACUTE DYSFUNCTION OF LEFT EUSTACHIAN TUBE: Status: ACTIVE | Noted: 2022-05-24

## 2022-05-24 PROCEDURE — 3074F SYST BP LT 130 MM HG: CPT | Performed by: FAMILY MEDICINE

## 2022-05-24 PROCEDURE — 3078F DIAST BP <80 MM HG: CPT | Performed by: FAMILY MEDICINE

## 2022-05-24 PROCEDURE — 99396 PREV VISIT EST AGE 40-64: CPT | Performed by: FAMILY MEDICINE

## 2022-05-24 PROCEDURE — 3008F BODY MASS INDEX DOCD: CPT | Performed by: FAMILY MEDICINE

## 2022-05-24 RX ORDER — ROPINIROLE 1 MG/1
1 TABLET, FILM COATED ORAL NIGHTLY
Qty: 90 TABLET | Refills: 3 | Status: SHIPPED | OUTPATIENT
Start: 2022-05-24

## 2022-05-24 RX ORDER — FLUTICASONE PROPIONATE 50 MCG
2 SPRAY, SUSPENSION (ML) NASAL DAILY
Qty: 18.2 ML | Refills: 0 | Status: SHIPPED | OUTPATIENT
Start: 2022-05-24 | End: 2023-05-19

## 2022-05-24 RX ORDER — HYDROCODONE BITARTRATE AND ACETAMINOPHEN 5; 325 MG/1; MG/1
TABLET ORAL
COMMUNITY
Start: 2022-04-14 | End: 2022-05-24

## 2022-05-24 RX ORDER — BUPROPION HYDROCHLORIDE 150 MG/1
150 TABLET ORAL DAILY
Qty: 90 TABLET | Refills: 3 | Status: SHIPPED | OUTPATIENT
Start: 2022-05-24

## 2022-05-24 RX ORDER — ESCITALOPRAM OXALATE 20 MG/1
20 TABLET ORAL DAILY
Qty: 90 TABLET | Refills: 1 | Status: SHIPPED | OUTPATIENT
Start: 2022-05-24 | End: 2022-05-24

## 2022-05-24 RX ORDER — CHLORHEXIDINE GLUCONATE 0.12 MG/ML
RINSE ORAL
COMMUNITY
Start: 2022-04-14 | End: 2022-05-24

## 2022-05-24 RX ORDER — AMOXICILLIN 500 MG/1
CAPSULE ORAL
COMMUNITY
Start: 2022-04-14 | End: 2022-05-24

## 2022-05-24 RX ORDER — ESCITALOPRAM OXALATE 20 MG/1
20 TABLET ORAL DAILY
Qty: 90 TABLET | Refills: 3 | Status: SHIPPED | OUTPATIENT
Start: 2022-05-24

## 2022-05-25 RX ORDER — TOPIRAMATE 100 MG/1
100 TABLET, FILM COATED ORAL DAILY
Qty: 180 TABLET | Refills: 1 | OUTPATIENT
Start: 2022-05-25

## 2022-08-07 DIAGNOSIS — E78.00 PURE HYPERCHOLESTEROLEMIA: ICD-10-CM

## 2022-08-08 RX ORDER — ATORVASTATIN CALCIUM 40 MG/1
TABLET, FILM COATED ORAL
Qty: 90 TABLET | Refills: 0 | Status: SHIPPED | OUTPATIENT
Start: 2022-08-08

## 2022-09-06 ENCOUNTER — TELEPHONE (OUTPATIENT)
Dept: ORTHOPEDICS CLINIC | Facility: CLINIC | Age: 52
End: 2022-09-06

## 2022-09-06 DIAGNOSIS — M25.562 LEFT KNEE PAIN, UNSPECIFIED CHRONICITY: Primary | ICD-10-CM

## 2022-09-06 DIAGNOSIS — M25.561 RIGHT KNEE PAIN, UNSPECIFIED CHRONICITY: ICD-10-CM

## 2022-09-06 NOTE — TELEPHONE ENCOUNTER
Patient is scheduled with Dr. Ignacio Shafer for bilateral knee pain. Please advise if imaging is needed.

## 2022-09-06 NOTE — TELEPHONE ENCOUNTER
Reviewed patients chart, xray orders are required. Order placed for bilateral knee xrays.  Please contact patient advise to arrive 30 mins prior to patients appt to complete x-ray order and schedule patients xray appt-Thank you

## 2022-10-05 ENCOUNTER — OFFICE VISIT (OUTPATIENT)
Dept: ORTHOPEDICS CLINIC | Facility: CLINIC | Age: 52
End: 2022-10-05
Payer: COMMERCIAL

## 2022-10-05 ENCOUNTER — TELEPHONE (OUTPATIENT)
Dept: ORTHOPEDICS CLINIC | Facility: CLINIC | Age: 52
End: 2022-10-05

## 2022-10-05 ENCOUNTER — HOSPITAL ENCOUNTER (OUTPATIENT)
Dept: GENERAL RADIOLOGY | Age: 52
Discharge: HOME | End: 2022-10-05
Attending: ORTHOPAEDIC SURGERY
Payer: COMMERCIAL

## 2022-10-05 VITALS — HEIGHT: 69 IN | BODY MASS INDEX: 26.69 KG/M2 | WEIGHT: 180.19 LBS | OXYGEN SATURATION: 99 % | HEART RATE: 66 BPM

## 2022-10-05 DIAGNOSIS — M25.562 LEFT KNEE PAIN, UNSPECIFIED CHRONICITY: ICD-10-CM

## 2022-10-05 DIAGNOSIS — M17.0 PRIMARY OSTEOARTHRITIS OF BOTH KNEES: Primary | ICD-10-CM

## 2022-10-05 DIAGNOSIS — M25.561 RIGHT KNEE PAIN, UNSPECIFIED CHRONICITY: ICD-10-CM

## 2022-10-05 PROCEDURE — 73564 X-RAY EXAM KNEE 4 OR MORE: CPT | Performed by: ORTHOPAEDIC SURGERY

## 2022-10-05 PROCEDURE — 3008F BODY MASS INDEX DOCD: CPT | Performed by: ORTHOPAEDIC SURGERY

## 2022-10-05 PROCEDURE — 20610 DRAIN/INJ JOINT/BURSA W/O US: CPT | Performed by: ORTHOPAEDIC SURGERY

## 2022-10-05 PROCEDURE — 99213 OFFICE O/P EST LOW 20 MIN: CPT | Performed by: ORTHOPAEDIC SURGERY

## 2022-10-05 RX ORDER — TRIAMCINOLONE ACETONIDE 40 MG/ML
80 INJECTION, SUSPENSION INTRA-ARTICULAR; INTRAMUSCULAR ONCE
Status: COMPLETED | OUTPATIENT
Start: 2022-10-05 | End: 2022-10-05

## 2022-10-05 RX ORDER — MELOXICAM 7.5 MG/1
7.5 TABLET ORAL DAILY PRN
Qty: 30 TABLET | Refills: 0 | Status: SHIPPED | OUTPATIENT
Start: 2022-10-05

## 2022-10-05 RX ADMIN — TRIAMCINOLONE ACETONIDE 80 MG: 40 INJECTION, SUSPENSION INTRA-ARTICULAR; INTRAMUSCULAR at 09:21:00

## 2022-10-05 NOTE — PROCEDURES
After informed consent, the patient's right and left knees were marked, locally anesthetized with skin refrigerant, prepped with topical antiseptic, and injected with a mixture of 1mL 40mg/mL Kenalog, 2mL 1% lidocaine and 2mL 0.5% marcaine through the inferolateral portal.  A band-aid was applied. The patient tolerated the procedure well.     Waldemar Ricci MD, [address] [facility]  Phone [phone]  Fax [phone]

## 2022-10-05 NOTE — TELEPHONE ENCOUNTER
Meloxicam was prescribed today and they are calling to make Dr. Eric Carter aware of a possible drug interaction with another medication the patient is taking:  Escitalopram.  There in an Increased risk of stomach bleeding     Please call back [facility] , [phone]

## 2022-11-21 DIAGNOSIS — E78.00 PURE HYPERCHOLESTEROLEMIA: ICD-10-CM

## 2022-11-22 RX ORDER — ATORVASTATIN CALCIUM 40 MG/1
TABLET, FILM COATED ORAL
Qty: 90 TABLET | Refills: 0 | Status: SHIPPED | OUTPATIENT
Start: 2022-11-22

## 2022-12-05 ENCOUNTER — APPOINTMENT (OUTPATIENT)
Dept: URGENT CARE | Age: 52
End: 2022-12-05

## 2022-12-19 ENCOUNTER — TELEPHONE (OUTPATIENT)
Dept: ORTHOPEDICS CLINIC | Facility: CLINIC | Age: 52
End: 2022-12-19

## 2023-02-20 DIAGNOSIS — E78.00 PURE HYPERCHOLESTEROLEMIA: ICD-10-CM

## 2023-02-21 RX ORDER — ATORVASTATIN CALCIUM 40 MG/1
TABLET, FILM COATED ORAL
Qty: 90 TABLET | Refills: 0 | OUTPATIENT
Start: 2023-02-21

## 2023-05-17 DIAGNOSIS — G25.81 RESTLESS LEG SYNDROME: ICD-10-CM

## 2023-05-17 RX ORDER — ROPINIROLE 1 MG/1
1 TABLET, FILM COATED ORAL NIGHTLY
Qty: 30 TABLET | Refills: 1 | Status: SHIPPED | OUTPATIENT
Start: 2023-05-17

## 2023-05-17 NOTE — TELEPHONE ENCOUNTER
Patient requesting ropinirole 1 mg nightly. LOV was 5/24/2022. It is standard of care patient be seen annually with her physical and if any chronic medications that are stable that need to be refilled can be addressed at that time. Given 30-day supply and 1 refill but no further refills to be given until she is seen. Please inform.

## 2023-07-12 DIAGNOSIS — G25.81 RESTLESS LEG SYNDROME: ICD-10-CM

## 2023-07-12 RX ORDER — ROPINIROLE 1 MG/1
TABLET, FILM COATED ORAL
Qty: 30 TABLET | Refills: 0 | OUTPATIENT
Start: 2023-07-12

## 2023-07-12 NOTE — TELEPHONE ENCOUNTER
Pt requested this in May 2023 and was told at that time no more refills until seen:      From that TE:   Given 30-day supply and 1 refill but no further refills to be given until seen. No refills sent today. Needs to schedule appt with Dr. Jesse Garner. Thanks.

## 2023-07-12 NOTE — TELEPHONE ENCOUNTER
Refill no protocol available:     Pt requesting refill of   Requested Prescriptions     Pending Prescriptions Disp Refills    ROPINIROLE 1 MG Oral Tab [Pharmacy Med Name: rOPINIRole HCl Oral Tablet 1 MG] 30 tablet 0     Sig: TAKE ONE TABLET BY MOUTH EVERY NIGHT. MUST SEE MD FOR FURTHER REFILLS     Sent to Provider for review:    Last Time Medication was Filled:  5/17/2023    Last Office Visit with Provider: 5/24/2023    No future appointments.

## 2023-07-12 NOTE — TELEPHONE ENCOUNTER
Pt informed that she needs appt before more refills can be sent. Pt v/u. Will call us back to set up appt.

## 2024-01-23 PROBLEM — M75.02 ADHESIVE CAPSULITIS OF LEFT SHOULDER: Status: ACTIVE | Noted: 2024-01-23

## 2024-01-23 PROBLEM — E04.1 THYROID NODULE: Status: ACTIVE | Noted: 2023-06-27

## 2024-01-23 PROBLEM — B35.1 ONYCHOMYCOSIS OF LEFT GREAT TOE: Status: ACTIVE | Noted: 2024-01-23

## 2024-01-23 PROBLEM — R41.3 MEMORY LOSS: Status: ACTIVE | Noted: 2023-03-02

## 2024-01-23 PROBLEM — B00.9 HERPES SIMPLEX: Status: ACTIVE | Noted: 2023-03-02

## 2024-01-23 PROBLEM — L60.0 INGROWN TOENAIL: Status: ACTIVE | Noted: 2023-04-06

## 2024-01-23 PROBLEM — E78.2 MIXED HYPERLIPIDEMIA: Status: ACTIVE | Noted: 2023-03-02

## 2024-01-23 PROBLEM — E55.9 VITAMIN D DEFICIENCY: Status: ACTIVE | Noted: 2023-06-27

## 2024-01-23 PROBLEM — M54.32 LEFT SIDED SCIATICA: Status: ACTIVE | Noted: 2024-01-23

## 2024-01-25 ENCOUNTER — TELEPHONE (OUTPATIENT)
Dept: FAMILY MEDICINE CLINIC | Facility: CLINIC | Age: 54
End: 2024-01-25

## 2024-01-25 NOTE — TELEPHONE ENCOUNTER
LMOM to return call to the office. Provided pt office phone [phone] along with office hours.        Patent would need a nurse visit for flu vaccine.

## 2024-01-27 ENCOUNTER — HOSPITAL ENCOUNTER (OUTPATIENT)
Dept: BONE DENSITY | Age: 54
Discharge: HOME | End: 2024-01-27
Attending: FAMILY MEDICINE
Payer: COMMERCIAL

## 2024-01-27 DIAGNOSIS — Z78.0 ASYMPTOMATIC MENOPAUSE: ICD-10-CM

## 2024-01-27 PROCEDURE — 77080 DXA BONE DENSITY AXIAL: CPT | Performed by: FAMILY MEDICINE

## 2024-02-02 NOTE — PROGRESS NOTES
Results reviewed.  Notified by Kishore Jara,  DEXA scan indicates osteopenia.  Treatment of osteopenia includes taking vitamin D3 1000 international units or 25 mcg daily, 600 mg of calcium carbonate or Os-Niranjan twice daily and weightbearing exercises 30 minutes 3 times a week.  DEXA scan should be repeated to monitor every 2 years.  Please let me know if you have any questions.  Sincerely,  Dr. Galindo

## 2024-02-13 RX ORDER — ATORVASTATIN CALCIUM 40 MG/1
TABLET, FILM COATED ORAL
COMMUNITY

## 2024-02-15 ENCOUNTER — OFFICE VISIT (OUTPATIENT)
Dept: FAMILY MEDICINE CLINIC | Facility: CLINIC | Age: 54
End: 2024-02-15
Payer: COMMERCIAL

## 2024-02-15 VITALS
OXYGEN SATURATION: 97 % | BODY MASS INDEX: 25.17 KG/M2 | WEIGHT: 160.38 LBS | HEART RATE: 70 BPM | TEMPERATURE: 97 F | SYSTOLIC BLOOD PRESSURE: 116 MMHG | HEIGHT: 67 IN | RESPIRATION RATE: 20 BRPM | DIASTOLIC BLOOD PRESSURE: 70 MMHG

## 2024-02-15 DIAGNOSIS — Z13.0 SCREENING FOR ENDOCRINE, NUTRITIONAL, METABOLIC AND IMMUNITY DISORDER: ICD-10-CM

## 2024-02-15 DIAGNOSIS — R41.3 SHORT-TERM MEMORY LOSS: ICD-10-CM

## 2024-02-15 DIAGNOSIS — Z12.4 SCREENING FOR CERVICAL CANCER: ICD-10-CM

## 2024-02-15 DIAGNOSIS — F41.9 ANXIETY AND DEPRESSION: ICD-10-CM

## 2024-02-15 DIAGNOSIS — Z13.21 SCREENING FOR ENDOCRINE, NUTRITIONAL, METABOLIC AND IMMUNITY DISORDER: ICD-10-CM

## 2024-02-15 DIAGNOSIS — Z13.228 SCREENING FOR ENDOCRINE, NUTRITIONAL, METABOLIC AND IMMUNITY DISORDER: ICD-10-CM

## 2024-02-15 DIAGNOSIS — B00.1 RECURRENT COLD SORES: ICD-10-CM

## 2024-02-15 DIAGNOSIS — E78.00 PURE HYPERCHOLESTEROLEMIA: ICD-10-CM

## 2024-02-15 DIAGNOSIS — F32.A ANXIETY AND DEPRESSION: ICD-10-CM

## 2024-02-15 DIAGNOSIS — Z00.00 ANNUAL PHYSICAL EXAM: Primary | ICD-10-CM

## 2024-02-15 DIAGNOSIS — Z13.29 SCREENING FOR ENDOCRINE, NUTRITIONAL, METABOLIC AND IMMUNITY DISORDER: ICD-10-CM

## 2024-02-15 DIAGNOSIS — E04.1 THYROID NODULE: ICD-10-CM

## 2024-02-15 DIAGNOSIS — F51.04 PSYCHOPHYSIOLOGICAL INSOMNIA: ICD-10-CM

## 2024-02-15 DIAGNOSIS — G25.81 RESTLESS LEG SYNDROME: ICD-10-CM

## 2024-02-15 DIAGNOSIS — Z12.31 ENCOUNTER FOR SCREENING MAMMOGRAM FOR MALIGNANT NEOPLASM OF BREAST: ICD-10-CM

## 2024-02-15 DIAGNOSIS — M85.859 OSTEOPENIA OF NECK OF FEMUR, UNSPECIFIED LATERALITY: ICD-10-CM

## 2024-02-15 PROBLEM — R22.41 HIP MASS, RIGHT: Status: RESOLVED | Noted: 2020-09-22 | Resolved: 2024-02-15

## 2024-02-15 PROBLEM — L60.0 INGROWN TOENAIL: Status: RESOLVED | Noted: 2023-04-06 | Resolved: 2024-02-15

## 2024-02-15 PROBLEM — B35.1 ONYCHOMYCOSIS OF LEFT GREAT TOE: Status: RESOLVED | Noted: 2024-01-23 | Resolved: 2024-02-15

## 2024-02-15 PROBLEM — B00.9 HERPES SIMPLEX: Status: RESOLVED | Noted: 2023-03-02 | Resolved: 2024-02-15

## 2024-02-15 PROCEDURE — 99396 PREV VISIT EST AGE 40-64: CPT | Performed by: FAMILY MEDICINE

## 2024-02-15 PROCEDURE — 90471 IMMUNIZATION ADMIN: CPT | Performed by: FAMILY MEDICINE

## 2024-02-15 PROCEDURE — 87624 HPV HI-RISK TYP POOLED RSLT: CPT | Performed by: FAMILY MEDICINE

## 2024-02-15 PROCEDURE — 90686 IIV4 VACC NO PRSV 0.5 ML IM: CPT | Performed by: FAMILY MEDICINE

## 2024-02-15 PROCEDURE — 88175 CYTOPATH C/V AUTO FLUID REDO: CPT | Performed by: FAMILY MEDICINE

## 2024-02-15 RX ORDER — VALACYCLOVIR HYDROCHLORIDE 1 G/1
2000 TABLET, FILM COATED ORAL EVERY 12 HOURS SCHEDULED
Qty: 4 TABLET | Refills: 3 | Status: SHIPPED | OUTPATIENT
Start: 2024-02-15

## 2024-02-15 RX ORDER — ESCITALOPRAM OXALATE 20 MG/1
20 TABLET ORAL DAILY
Qty: 90 TABLET | Refills: 3 | Status: SHIPPED | OUTPATIENT
Start: 2024-02-15

## 2024-02-15 RX ORDER — ROPINIROLE 1 MG/1
1 TABLET, FILM COATED ORAL NIGHTLY
Qty: 90 TABLET | Refills: 3 | Status: SHIPPED | OUTPATIENT
Start: 2024-02-15

## 2024-02-15 RX ORDER — BUPROPION HYDROCHLORIDE 300 MG/1
300 TABLET ORAL DAILY
Qty: 90 TABLET | Refills: 3 | Status: SHIPPED | OUTPATIENT
Start: 2024-02-15

## 2024-02-15 NOTE — PATIENT INSTRUCTIONS
Perform labs fasting 8 hours with water or black coffee or or black tea diet  soda only prior to exam.    -Encourage healthy diet of whole food and avoid processed food and sugary drinks and sodas.  Diet should include lean meats and vegetables including 5-7 servings of fruit and vegetables total in 1 day.  Never skip breakfast.  -Encouraged exercise 30 minutes to 60 minutes 3-5 times weekly for 150minutes or more to prevent obesity and chronic disease and eliminate stress and its effect on the body.  -encouraged to continue not smoking or vaping  - recommend condom use per CDC recommendation for all  or unmarried couples  -mammogram order given if 40years old or older  - immunizations-annual flu shot recommended  -Vitamin D3  2000 units daily recommended- buy Over-the-counter  -Recommend 1000mg of calcium daily for osteoporosis prevention discussed. Need to ingest 1000mg of calcium daily to prevent osteoporosis later in life.  I.e. one 8 ounce glass of silk Evant milk has 450 mg of calcium and label states 45%.  Labels list calcium percentages not milligrams.  To calculate milligrams per serving remove the percentage and add a zero (0).  I.e. 9% calcium equals 90 mg  -thin prep pap recommended every 3 years-If previous pap was normal  sooner as directed by your doctor.  If vaginal bleeding occurs after menopause or 1 year after your period stopped, please contact your primary care physician or OB/GYN and schedule an appointment as soon as possible to rule out endometrial cancer    Exercise for a Healthier Heart     Exercise with a friend. When activity is fun, you're more likely to stick with it.   You may wonder how you can improve the health of your heart. If you’re thinking about exercise, you’re on the right track. You don’t need to become an athlete, but you do need a certain amount of brisk exercise to help strengthen your heart. If you have been diagnosed with a heart condition, your doctor may  recommend exercise to help stabilize your condition. To help make exercise a habit, choose safe, fun activities.  Be sure to check with your healthcare provider before starting an exercise program.   Why exercise?  Exercising regularly offers many healthy rewards. It can help you do all of the following:  Improve your blood cholesterol level to help prevent further heart trouble  Lower your blood pressure to help prevent a stroke or heart attack  Control diabetes, or reduce your risk of getting this disease  Improve your heart and lung function  Reach and maintain a healthy weight  Make your muscles stronger and more limber so you can stay active  Prevent falls and fractures by slowing the loss of bone mass (osteoporosis)  Manage stress better  Reduce your blood pressure  Improve your sense of self and your body image  Exercise tips  Ease into your routine. Set small goals. Then build on them.  Exercise on most days. Aim for a total of 150 or more minutes of moderate to  vigorous intensity activity each week. Consider 40 minutes, 3 to 4 times a week. For best results, activity should last for 40 minutes on average. It is OK to work up to the 40 minute period over time. Examples of moderate-intensity activity is walking 1 mile in 15 minutes or 30 to 45 minutes of yard work.  Step up your daily activity level. Along with your exercise program, try being more active throughout the day. Walk instead of drive. Do more household tasks or yard work.  Choose one or more activities you enjoy. Walking is one of the easiest things you can do. You can also try swimming, riding a bike, dancing, or taking an exercise class.  Stop exercising and call your doctor if you:  Have chest pain or feel dizzy or lightheaded  Feel burning, tightness, pressure, or heaviness in your chest, neck, shoulders, back, or arms  Have unusual shortness of breath  Have increased joint or muscle pain  Have palpitations or an irregular heartbeat   Date  Last Reviewed: 5/1/2016  © 9684-2420 [employer]. [address] [place] [address], [place] [address]. All rights reserved. This information is not intended as a substitute for professional medical care. Always follow your healthcare professional's instructions.      Eating Heart-Healthy Foods  Eating has a big impact on your heart health. In fact, eating healthier can improve several of your heart risks at once. For instance, it helps you manage weight, cholesterol, and blood pressure. Here are ideas to help you make heart-healthy changes without giving up all the foods and flavors you love.  Getting started  Talk with your healthcare provider about eating plans, such as the DASH or Mediterranean diet. You may also be referred to a dietitian.  Change a few things at a time. Give yourself time to get used to a few eating changes before adding more.  Work to create a tasty, healthy eating plan that you can stick to for the rest of your life.    Goals for healthy eating  Below are some tips to improve your eating habits:  Limit saturated fats and trans fats. Saturated fats raise your levels of cholesterol, so keep these fats to a minimum. They are found in foods such as fatty meats, whole milk, cheese, and palm and coconut oils. Avoid trans fats because they lower good cholesterol as well as raise bad cholesterol. Trans fats are most often found in processed foods.  Reduce sodium (salt) intake. Eating too much salt may increase your blood pressure. Limit your sodium intake to 2,300 milligrams (mg) per day (the amount in 1 teaspoon of salt), or less if your healthcare provider recommends it. Dining out less often and eating fewer processed foods are two great ways to decrease the amount of salt you consume.  Managing calories. A calorie is a unit of energy. Your body burns calories for fuel, but if you eat more calories than your body burns, the extras are stored as fat. Your healthcare provider can help you create  a diet plan to manage your calories. This will likely include eating healthier foods as well as exercising regularly. To help you track your progress, keep a diary to record what you eat and how often you exercise.  Choose the right foods  Aim to make these foods staples of your diet. If you have diabetes, you may have different recommendations than what is listed here:  Fruits and vegetables provide plenty of nutrients without a lot of calories. At meals, fill half your plate with these foods. Split the other half of your plate between whole grains and lean protein.  Whole grains are high in fiber and rich in vitamins and nutrients. Good choices include whole-wheat bread, pasta, and brown rice.  Lean proteins give you nutrition with less fat. Good choices include fish, skinless chicken, and beans.  Low-fat or nonfat dairy provides nutrients without a lot of fat. Try low-fat or nonfat milk, cheese, or yogurt.  Healthy fats can be good for you in small amounts. These are unsaturated fats, such as olive oil, nuts, and fish. Try to have at least 2 servings per week of fatty fish, such as salmon, sardines, mackerel, rainbow trout, and albacore tuna. These contain omega-3 fatty acids, which are good for your heart. Flaxseed is another source of a heart-healthy fat.  More on heart-healthy eating  Read food labels  Healthy eating starts at the grocery store. Be sure to pay attention to food labels on packaged foods. Look for products that are high in fiber and protein, and low in saturated fat, cholesterol, and sodium. Avoid products that contain trans fat. And pay close attention to serving size. For instance, if you plan to eat two servings, double all the numbers on the label.  Prepare food right  A key part of healthy cooking is cutting down on added fat and salt. Look on the internet for lower-fat, lower-sodium recipes. Also, try these tips:  Remove fat from meat and skin from poultry before cooking.  Skim fat from  the surface of soups and sauces.  Broil, boil, bake, steam, grill, and microwave food without added fats.  Choose ingredients that spice up your food without adding calories, fat, or sodium. Try these items: horseradish, hot sauce, lemon, mustard, nonfat salad dressings, and vinegar. For salt-free herbs and spices, try basil, cilantro, cinnamon, pepper, and rosemary.  Date Last Reviewed: 10/1/2017  © 6594-7728 [employer]. [address] [place] [id number]. All rights reserved. This information is not intended as a substitute for professional medical care. Always follow your healthcare professional's instructions.       What Is Osteoporosis?  Osteoporosis is a disease that weakens the bones. Weakened bones are more likely to break (fracture). Osteoporosis affects both men and women. But postmenopausal women are most at risk. To help prevent osteoporosis, you need to exercise and nourish your bones throughout your life.    Childhood  The body builds the most bone during these years. That's why boys and girls need foods rich in calcium. They also need plenty of exercise. A healthy diet and exercise helps bones grow strong.  Young adulthood to age 30  During young adulthood, bones become their strongest. This is called peak bone mass. The same good habits that kept bones healthy in childhood help keep bones healthy in adulthood.  Age 30 to menopause  Bone mass declines slightly during these years. Your body makes just enough new bone to maintain peak bone mass. To keep your bones at their peak mass, be sure to exercise and get plenty of calcium.  After menopause  Menopause is when a woman stops having monthly periods. After menopause, the body makes less estrogen (female hormone). This increases bone loss. At this point, treatment may be needed to reduce the risk for fracture. Exercise and calcium can also help keep your bones strong.  Later in life  In later years, both men and women need to take  extra care of their bones. By this point, the body loses more bone than it makes. If too much bone is lost, you may be at increased risk for fractures. With age, the quality and quantity of bone declines. You can lessen bone loss by staying active and increasing your calcium intake. Calcium supplements and other osteoporosis treatments do have risks. So talk with your healthcare provider if you have concerns. If you have osteoporosis, you can also learn ways to increase everyday safety.  Date Last Reviewed: 5/1/2018 © 2000-2018 [employer]. [address] [place] [id number]. All rights reserved. This information is not intended as a substitute for professional medical care. Always follow your healthcare professional's instructions.          Preventing Osteoporosis: Meeting Your Calcium Needs    Your body needs calcium to build and repair bones. But it can't make calcium on its own. That's why it's important to eat calcium-rich foods. Some foods are naturally rich in calcium. Others have calcium added (fortified). It's best to get calcium from the foods you eat. But if you can't get enough, you may want to take calcium supplements. To meet your daily calcium needs, try the foods listed below.               Dairy Fish & beans Other sources   Source   Calcium (mg) per serving   Source   Calcium (mg) per serving   Source   Calcium (mg) per serving   Low-fat yogurt, plain   415 mg/8 oz.   Sardines, Atlantic, canned, with bones   351 mg/3 oz.   Oatmeal, instant, fortified   215 mg/1 cup   Nonfat milk   302 mg/1 cup   [place], sockeye, canned, with bones   239 mg/3 oz.   Tofu made with calcium sulfate   204 mg/3 oz.   Low-fat milk   297 mg/1 cup   Soybeans, fresh, boiled   131 mg/1/2 cup   Collards   179 mg/1/2 cup   Swiss cheese   272 mg/1 oz.   White beans, cooked   81 mg/1/2 cup   English muffin, whole wheat   175 mg/1 muffin   Cheddar cheese   205 mg/1 oz.   Navy beans, cooked   79 mg/1/2 cup    Kale   90 mg/1/2 cup   Ice cream strawberry   79 mg/1/2 cup           Orange, navel   56 mg/1 medium   Note: Calcium levels may vary depending on brand and size.  Daily calcium needs  14 to 18 years old: 1,300 mg  19 to 30 years old: 1,000 mg  31 to 50 years old: 1,000 mg  51 to 70 years old, women: 1,200 mg  51 to 70 years old, men: 1,000 mg  Pregnant or nursin to 18 years old: 1,300 mg, 19 to 50 years old: 1,000 mg  Older than 70 (women and men): 1,200 mg   Date Last Reviewed: 2018  © 2264-3782 [employer]. [address] [place] [address], [place] [address]. All rights reserved. This information is not intended as a substitute for professional medical care. Always follow your healthcare professional's instructions.          Vitamin D  Does this test have other names?  25-hydroxyvitamin D (25-high-DROX-ee-VIE-tuh-min D), 25(OH)D  What is this test?  Vitamin D is mainly found in fortified dairy foods, juice, breakfast cereal, and certain fish. This vitamin plays many roles in the body. But because it helps the body absorb calcium from foods and supplements, it's particularly important for bone health. Vitamin D has many additional roles in the body.  Vitamin D comes in several forms. When ultraviolet light, such as sunlight, hits your skin, it creates vitamin D3. D2 is used to fortify dairy foods. Both of these are further processed by your liver and kidneys into a form your body can use. Most tests for vitamin D check the level of a form circulating in the body called 25-hydroxyvitamin D, also called 25(OH)D.   Why do I need this test?  You may need this test if your healthcare provider wants to check your vitamin D levels to find out if you have any risks to bone health. These might be:  Low calcium  Soft bones caused by low vitamin D or problems using it (osteomalacia)  Osteopenia  Osteoporosis  Rickets, in children  You may also need this test if you are at risk for low vitamin D levels. Risks  include:  Being an older adult  Having difficulty absorbing fat from your diet  Having chronic kidney disease  Have dark skin pigmentation  Being a  baby  Vitamin D has many effects in the body. You may need this test to help your healthcare provider diagnose or treat:  Problems with the parathyroid gland  Cancer  Autoimmune diseases, such as multiple sclerosis and Crohn's disease  Psoriasis  Asthma  Weakness or falls    What other tests might I have along with this test?  A healthcare provider may also want to check your parathyroid hormone levels and your calcium levels.   What do my test results mean?  Test results may vary depending on your age, gender, health history, the method used for the test, and other things. Your test results may not mean you have a problem. Ask your healthcare provider what your test results mean for you.   Children and adults need more than 30 nanograms per milliliter (ng/ml) of vitamin D. The optimal level of 25(OH)D is usually between 30 and 60 ng/mL. Recommended daily amounts range from 400 to 800 international units (IU) per day based on your age.  Levels lower than normal can mean you are:  Not making enough vitamin D on your own  Not getting enough vitamin D in your diet  Not absorbing vitamin D from your food as you should  Lower levels may also mean that your body is not converting the vitamin as it should. This might be because of kidney or liver disease.  Above-normal levels may be a sign that you're taking too much in supplement form.   How is this test done?  The test is done with a blood sample. A needle is used to draw blood from a vein in your arm or hand.   Does this test pose any risks?  Having a blood test with a needle carries some risks. These include bleeding, infection, bruising, and feeling lightheaded. When the needle pricks your arm or hand, you may feel a slight sting or pain. Afterward, the site may be sore.   What might affect my test results?  The  amount of time you spend in the sunlight, your diet, and whether you take vitamin D in supplement form can affect your vitamin D levels. Ask your healthcare provider if any health conditions you have or medicines you take could affect your results.  How do I get ready for this test?  Tell your healthcare provider if you take vitamin D supplements. Be sure your healthcare provider knows about all medicines, herbs, vitamins, and supplements you are taking. This includes medicines that don't need a prescription and any illicit drugs you may use.   © 1482-0949 [employer]. [address] [place] [address]. All rights reserved. This information is not intended as a substitute for professional medical care. Always follow your healthcare professional's instructions.          Preventing Osteoporosis: Avoiding Bone Loss  Certain factors can speed up bone loss or decrease bone growth. For example, alcohol, cigarettes, and certain medicines reduce bone mass. Some foods make it hard for your body to absorb calcium.    Things to avoid  Here are things to avoid to help prevent osteoporosis:  Alcohol. This is toxic to bones. It is a major cause of bone loss. Heavy drinking can cause osteoporosis even if you have no other risk factors.  Smoking. This reduces bone mass. Smoking may also interfere with estrogen levels and cause early menopause.  Inactivity. Not being active makes your bones lose strength and become thinner. Over time, thin bones may break. Women who aren't active are at a high risk for osteoporosis.  Certain medicines. Some medicines, such as cortisone, increase bone loss. They also decrease bone growth. Ask your healthcare provider about any side effects of your medicines, and how to prevent them.  Protein-rich or salty foods. Eaten in large amounts, these foods may deplete calcium.  Caffeine. This increases calcium loss. People who drink a lot of coffee, tea, or soda lose more calcium than  those who don't.  Date Last Reviewed: 5/1/2018 © 2000-2018 [employer]. [address] [place] [address]. All rights reserved. This information is not intended as a substitute for professional medical care. Always follow your healthcare professional's instructions.          Living with Osteoporosis: Regular Exercise  If you have osteoporosis, exercise is vital for your health. It can prevent bone fractures and spine changes. It will slow bone loss. Exercise will strengthen your body. It can also be fun. A variety of exercises is best. See below for exercises that can help you. But before you start, talk with your healthcare provider to be sure these exercises are right for you.    Resistance exercises. These build muscle strength and maintain bone mass. They also make you less prone to injury. Exercises include lifting small weights, doing push-ups and sit-ups, using elastic exercise bands, and using weight machines.  Weight-bearing activities. These help your whole body. They also help you maintain bone mass. Activities include walking, dancing, and housework.  Non-weight-bearing exercises. These help prevent back strain and pain. They do this by building the trunk and leg muscles. Exercises that help with flexibility can prevent falls. Examples include swimming, water exercise, and stretching.  Staying safe  Here are tips to stay safe:   Always check with your healthcare provider before starting any new exercise program.  Use weights only as instructed.  Stop any exercise that causes pain.   Date Last Reviewed: 5/1/2018  © 5871-7466 [employer]. [address] [place] [address]. All rights reserved. This information is not intended as a substitute for professional medical care. Always follow your healthcare professional's instructions.

## 2024-02-15 NOTE — PROGRESS NOTES
REASON FOR VISIT:    Felicita Jara is a 53 year old female who presents for an Annual Health Assessment.      Pt presents for recheck of hyperlipidemia. Patient reports taking medications as instructed, no medication side effects noted. Denies any generalized muscle aches.  Taking rosuvastatin.  Overdue for lab work glues to complete in the next week for further refills    History of anxiety/depression-worse with perimenopause/menopause in the past 3-4 years.  On escitalopram 20 mg and bupropion- feels controlled.  Denies feeling depressed, hopeless or sad.  Denies anhedonia and brain fog.  Sleeping well.   Denies SI or HI.      History of restless leg syndrome on ropinirole nightly.  Sleeps well.  Denied medication side effects.  Wants to continue.  Has no insomnia when taking ropinirole.      Thyroid nodule-had ultrasound in 3/23/2023 in [facility] had a subcentimeter nodule.  No difficulty swallowing.  Has family history of thyroid disease no known cancer.    , monogamous    menses: Menopause age 47-denies vaginal bleeding.  Hot flashes controlled  Last pap: 2020--normal, negative HPV.    History of abnormal pap: Denies  On vit D daily -1000IU daily   MVI- no  Calcium-not monitoring, some dairy  Colonoscopy-12/10/2021-recall in 10 years-hyperplastic polyp-performed by Dr. Prieto,\                          2014 3-4 polyps-[facility]  DEXA: 2024-osteopenia in the femoral neck with a T-score of -2.0-other parameters normal  Mammogram 3/24/9421-JK-IYIN 2-scheduled 2023  Exercise-none   Diet- healthy keto lean proteins, fish 2x weekly, chicken, rare red meat  Dentist regularly- yes  Annual eye exam-yes  Etoh: Abstinent from alcohol  Cigs: former smoker quit 2017-15-pack-year history  Illicits: Denies, no marijuana  Immunizations: Needs COVID-19 booster, Shingrix  FH significant: Adopted    Patient Active Problem List   Diagnosis     Bilateral carpal tunnel syndrome    Hand arthritis    Hot flashes    BMI 27.0-27.9,adult    Recurrent boils    Insomnia due to other mental disorder    Hip mass, right    Lipoma of torso    Psychophysiological insomnia    Snoring    Restless leg syndrome    Short-term memory loss    History of alcohol abuse    Pure hypercholesterolemia    Recurrent cold sores    History of adenomatous polyp of colon    Benign neoplasm of transverse colon    Benign neoplasm of sigmoid colon    Anxiety and depression    Chronic pain of both knees    Acute dysfunction of left eustachian tube    Herpes simplex    Ingrown toenail    Memory loss    Mixed hyperlipidemia    Thyroid nodule    Vitamin D deficiency    BMI 23.0-23.9, adult    Onychomycosis of left great toe    Adhesive capsulitis of left shoulder    Left sided sciatica     Current Outpatient Medications   Medication Sig Dispense Refill    atorvastatin 40 MG Oral Tab       Cholecalciferol 50 MCG (2000 UT) Oral Cap Take by mouth daily.      rOPINIRole 1 MG Oral Tab Take 1 tablet (1 mg total) by mouth nightly. 90 tablet 1    buPROPion  MG Oral Tablet 24 Hr Take 1 tablet (300 mg total) by mouth daily. 90 tablet 1    escitalopram 20 MG Oral Tab Take 1 tablet (20 mg total) by mouth daily. 90 tablet 1    topiramate 100 MG Oral Tab Take 1 tablet (100 mg total) by mouth daily. 180 tablet 1    rosuvastatin 20 MG Oral Tab Take 1 tablet (20 mg total) by mouth nightly. 90 tablet 0    valACYclovir 1 G Oral Tab Take 2 tablets (2,000 mg total) by mouth every 12 (twelve) hours. 4 tablet 3     Wt Readings from Last 6 Encounters:   02/15/24 160 lb 6.4 oz (72.8 kg)   01/23/24 162 lb (73.5 kg)   10/05/22 180 lb 3.2 oz (81.7 kg)   05/24/22 177 lb 9.6 oz (80.6 kg)   12/14/21 176 lb (79.8 kg)   12/02/21 180 lb (81.6 kg)     Body mass index is 25.12 kg/m².    No results found for: \"GLUCOSE\"  Lab Results   Component Value Date    CHOLEST 144 12/03/2021    CHOLEST 145 04/08/2021    CHOLEST 228 (H)  02/04/2021     Lab Results   Component Value Date    HDL 48 12/03/2021    HDL 49 04/08/2021    HDL 45 02/04/2021     No results found for: \"TRIGLY\"  Lab Results   Component Value Date    LDL 84 12/03/2021    LDL 83 04/08/2021     (H) 02/04/2021     Lab Results   Component Value Date    AST 22 12/03/2021    AST 24 02/04/2021    AST 19 09/17/2020     Lab Results   Component Value Date    ALT 40 12/03/2021    ALT 30 02/04/2021    ALT 34 09/17/2020     Lab Results   Component Value Date    TSH 2.730 09/17/2020    TSH 1.640 10/10/2019     Lab Results   Component Value Date    BUN 17 12/03/2021    BUN 16 09/17/2020    BUN 19 (H) 10/10/2019    CREATSERUM 0.84 12/03/2021    CREATSERUM 0.67 09/17/2020    CREATSERUM 0.82 10/10/2019       General Health     How would you describe your current health state?: Good    Type of Diet: Balanced    How do you maintain positive mental well-being?: Visiting Friends;Visiting Family    How would you describe your daily physical activity?: Moderate    If you are a male age 45-79 or a female age 55-79, do you take aspirin?: No    Have you had any immunizations at another office such as Influenza, Hepatitis B, Tetanus, or Pneumococcal?: No    At any time do you feel concerned for the safety/well-being of yourself and/or your children, in your home or elsewhere?: No     CAGE:     Cut: Have you ever felt you should Cut down on your drinking?: No    Annoyed: Have people Annoyed you by criticizing your drinking?: No    Guilty: Have you ever felt bad or Guilty about your drinking?: No    Eye Opener: Have you ever had a drink first thing in the morning to steady your nerves or to get rid of a hangover (Eye opener)?: No    Scoring  Total Score: 0     Depression Screening (PHQ-2/PHQ-9): Over the LAST 2 WEEKS   Little interest or pleasure in doing things (over the last two weeks)?: Not at all    Feeling down, depressed, or hopeless (over the last two weeks)?: Not at all    PHQ-2 SCORE: 0         PREVENTATIVE SERVICES  INDICATIONS AND SCHEDULE Recommendation Internal Lab or Procedure External Lab or Procedure   Breast Cancer Screening   Every 2 yrs age 50-74 Health Maintenance   Topic Date Due    Mammogram  03/24/2023       Pap Every 3 yrs age 21-65 or Pap and HPV every 5 yrs age 30-65 Health Maintenance   Topic Date Due    Pap Smear  09/17/2023       Chlamydia Screening Screen Annually age<25, if sex active/on OCPs; >24 high risk No results found for: \"CHLAMYDIA\"    Colonoscopy Screen Every 10 years Health Maintenance   Topic Date Due    Colorectal Cancer Screening  12/10/2031       Flex Sigmoidoscopy Screen  Every 5 years No results found for this or any previous visit.    Fecal Occult Blood  Annually Occult Blood (no units)   Date Value   07/05/2021 Negative       Obesity Screening Screen all adults annually Body mass index is 25.12 kg/m².      Preventive Services for Which Recommendations Vary with Risk Recommendation Internal Lab or Procedure External Lab or Procedure   Cholesterol Screening Recommended screening varies with age, risk and gender LDL Cholesterol (mg/dL)   Date Value   12/03/2021 84       Diabetes Screening  if history of high blood pressure or other  risk factors HgbA1C (%)   Date Value   02/04/2021 5.6     Glucose (mg/dL)   Date Value   12/03/2021 103 (H)         Gonorrhea Screening if high risk No results found for: \"GONOCOCCUS\"    HIV Screening For all adults age 18-65, older adults at increased risk No results found for: \"HIV\"    Syphilis Screening Screen if pregnant or high risk No results found for: \"RPR\"    Hepatitis C Screening Screen those at high risk plus screen one time for adults born 1947-1 965 No results found for: \"HCVAB\"    Tuberculosis Screen if high risk No components found for: \"PPDINDURAT\"      Disease Monitoring:    SPECIFIC DISEASE MONITORING Internal Lab or Procedure External Lab or Procedure   Annual Monitoring of Persistent     Medications (ACE/ARB, digoxin,  diuretics)    Potassium  Annually Potassium (mmol/L)   Date Value   12/03/2021 4.2         No data to display                Creatinine  Annually Creatinine (mg/dL)   Date Value   12/03/2021 0.84         No data to display                Digoxin Serum Conc  Annually No results found for: \"DIGOXIN\"      No data to display                Diabetes      HgbA1C  Annually HgbA1C (%)   Date Value   02/04/2021 5.6         No data to display                Creat/alb ratio  Annually Creatinine (mg/dL)   Date Value   12/03/2021 0.84        LDL  Annually LDL Cholesterol (mg/dL)   Date Value   12/03/2021 84         No data to display                 Dilated Eye exam  Annually      No data to display                   No data to display                Asthma  (Annually between Nov. 1 & Dec. 31)    Date of last AAP/ACT and counseling given on importance of controller meds.                 ALLERGIES:     Allergies   Allergen Reactions    Niacin FACE FLUSHING    Gluten Meal DIARRHEA     Gluten sensitivity        CURRENT MEDICATIONS:   Current Outpatient Medications   Medication Sig Dispense Refill    atorvastatin 40 MG Oral Tab       Cholecalciferol 50 MCG (2000 UT) Oral Cap Take by mouth daily.      rOPINIRole 1 MG Oral Tab Take 1 tablet (1 mg total) by mouth nightly. 90 tablet 1    buPROPion  MG Oral Tablet 24 Hr Take 1 tablet (300 mg total) by mouth daily. 90 tablet 1    escitalopram 20 MG Oral Tab Take 1 tablet (20 mg total) by mouth daily. 90 tablet 1    topiramate 100 MG Oral Tab Take 1 tablet (100 mg total) by mouth daily. 180 tablet 1    rosuvastatin 20 MG Oral Tab Take 1 tablet (20 mg total) by mouth nightly. 90 tablet 0    valACYclovir 1 G Oral Tab Take 2 tablets (2,000 mg total) by mouth every 12 (twelve) hours. 4 tablet 3      MEDICAL INFORMATION:   Past Medical History:   Diagnosis Date    Abdominal distention     Alcoholism (HCC)     Allergic rhinitis 2 years +    Sinus headaches    Anxiety     Arthritis      Bloating     Colon polyp     Decorative tattoo     Depression     Diarrhea, unspecified     Essential hypertension     Hemorrhoids     IBS (irritable bowel syndrome)     Irregular bowel habits     Nausea     Obesity     Im working on it    Osteoarthritis Fingers    Stress     Trigger finger     Visual impairment     glasses    Weight gain       Past Surgical History:   Procedure Laterality Date    COLONOSCOPY  2014    COLONOSCOPY  Tummy problems    Getting better    HAND/FINGER SURGERY UNLISTED      OTHER SURGICAL HISTORY  Finger-pinky. Lipoma removed      Family History   Adopted: Yes   Problem Relation Age of Onset    Thyroid Disorder Mother     Thyroid disease Mother     Other (gallbladder removal) Mother     Other (TIA) Mother     Thyroid Disorder Maternal Grandmother     Brain Cancer Maternal Grandmother     Breast Cancer Maternal Grandmother     Other (smoker) Maternal Grandmother     Heart Disease Maternal Grandfather     Brain Cancer Maternal Aunt     Cancer Maternal Aunt         lung    Other (smoker) Maternal Aunt     Cancer Maternal Aunt     No Known Problems Maternal Aunt       SOCIAL HISTORY:   Social History     Socioeconomic History    Marital status:    Tobacco Use    Smoking status: Former     Packs/day: 0.25     Years: 15.00     Additional pack years: 0.00     Total pack years: 3.75     Types: Cigarettes     Quit date: 2017     Years since quittin.5    Smokeless tobacco: Never   Vaping Use    Vaping Use: Never used   Substance and Sexual Activity    Alcohol use: Not Currently     Alcohol/week: 0.0 standard drinks of alcohol     Comment: Quit 2019    Drug use: Never   Other Topics Concern    Caffeine Concern Yes     Comment: Trying to quit drinking diet coke    Stress Concern No    Weight Concern Yes     Comment: Hard time losing weight    Special Diet Yes     Comment: Not doing well on Mediterranean diet    Exercise No    Seat Belt No     Occ:    : yes       REVIEW OF SYSTEMS:   GENERAL: feels well otherwise  SKIN: denies any unusual skin lesions  EYES: denies blurred vision or double vision  HEENT: denies nasal congestion, sinus pain or ST  LUNGS: denies shortness of breath with exertion  CARDIOVASCULAR: denies chest pain on exertion  GI: denies abdominal pain, denies heartburn  : denies dysuria, vaginal discharge or itching.  menopausal, denies vaginal bleeding  MUSCULOSKELETAL: denies back pain  NEURO: Brain fog denies headaches  PSYCHE: Insomnia, fatigue denies depression or anxiety  HEMATOLOGIC: denies hx of anemia  ENDOCRINE: Vasomotor symptoms hot then cold denies thyroid history  ALL/ASTHMA: denies hx of allergy or asthma    EXAM:   /70 (BP Location: Left arm, Patient Position: Sitting, Cuff Size: adult)   Pulse 70   Temp 97.3 °F (36.3 °C) (Temporal)   Resp 20   Ht 5' 7\" (1.702 m)   Wt 160 lb 6.4 oz (72.8 kg)   LMP  (LMP Unknown)   SpO2 97%   BMI 25.12 kg/m²    No LMP recorded (lmp unknown). (Menstrual status: Menopause).   GENERAL: well developed, well nourished, in no apparent distress  SKIN: no rashes, no suspicious lesions  HEENT: atraumatic, normocephalic, ears clear bilateral-left TM is intact slightly retracted no redness.  EACs are clear bilateral tragus nontender bilateral  EYES:PERRLA, EOMI, normal optic disk, conjunctiva are clear  NECK: supple, no adenopathy, no bruits  CHEST: no chest tenderness  BREAST: no dominant or suspicious mass bilateral, axilla clear bilateral  LUNGS: clear to auscultation  CARDIO: RRR without murmur  GI: good BS's, no masses, HSM or tenderness  : introitus is normal, scant discharge, no adnexal masses or tenderness bilateral,   RECTAL: good rectal tone  MUSCULOSKELETAL: back is not tender, FROM of the back  EXTREMITIES: no cyanosis, clubbing or edema  NEURO: Oriented times three, cranial nerves are intact, motor and sensory are grossly intact  Psych: Slightly flat affect, very friendly,  appropriate answers to questions.  Good insight.  Well-groomed, normal speech, denies SI or HI  ASSESSMENT AND OTHER RELEVANT CHRONIC CONDITIONS:   Felicita Jara is a 53 year old female who presents for an Annual Health Assessment.     PLAN SUMMARY:   1. Annual physical exam  -Encourage Mediterranean diet  -Encouraged exercise 30 minutes to 60 minutes daily x 3-5x weekly for 150 minutes or more to prevent obesity and chronic disease and eliminate stress and its effect on the body.  -encouraged to continue not smoking  -safe sex practices - recommend condom use  -mammogram order placed- if age 40y or older, recommend annual  -self breast exams encouraged monthly  -immunizations- needs flu shot, Shingrix, recommend annual influenza shot in fall  -Vitamin D3  2000 units daily recommended  -Needs 1000 mg of calcium daily for osteoporosis prevention discussed  -thin prep pap recommended every 3 years if normal  - CBC With Differential With Platelet  - Comp Metabolic Panel (14)  - Lipid Panel  - TSH W Reflex To Free T4  - ThinPrep PAP Smear; Future  - Hpv Dna  High Risk , Thin Prep Collect; Future  - GUSTAVO LUAN 2D+3D SCREENING BILAT (CPT=77067/36452); Future  - Fluzone Quadrivalent 6mo+ 0.5mL  - ThinPrep PAP Smear  - Hpv Dna  High Risk , Thin Prep Collect    2. Pure hypercholesterolemia  - atorvastatin 40 MG Oral Tab  - Lipid Panel  The patient's ASCVD 10 year risk score is 1.3.  Continue rosuvastatin-overdue for labs  encourage mediterranean diet  Exercise brisk walk 30-60 mins at least 5 days weekly  Lose weight if overweight  Recheck fasting labs annually if repeat labs are stable    3. Anxiety and depression  - escitalopram 20 MG Oral Tab; Take 1 tablet (20 mg total) by mouth daily.  Dispense: 90 tablet; Refill: 3  - buPROPion  MG Oral Tablet 24 Hr; Take 1 tablet (300 mg total) by mouth daily.  Dispense: 90 tablet; Refill: 3  -contracts for safety- if suicidal or homicidal, call 911 or go to nearest ER and  call office  -increased suicide risk on  SNRI   Exercise 3 x weekly x 30-60min  Doing well-controlled  Follow-up annually sooner if symptoms worsen or change    4. Restless leg syndrome  - rOPINIRole 1 MG Oral Tab; Take 1 tablet (1 mg total) by mouth nightly.  Dispense: 90 tablet; Refill: 3  Controlled  Repeat annually    5. Recurrent cold sores  - valACYclovir 1 G Oral Tab; Take 2 tablets (2,000 mg total) by mouth every 12 (twelve) hours.  Dispense: 4 tablet; Refill: 3  Controlled    6. Osteopenia of neck of femur, unspecified laterality  -Start Calcium Carbonate-Vit D-Min (CALCIUM-VITAMIN D-MINERALS) 600-800 MG-UNIT Oral Chew Tab; Chew 1 tablet by mouth in the morning and 1 tablet before bedtime.  Dispense: 180 tablet; Refill: 3  Start MVI nightly  Weightbearing exercises at least 30 minutes 3 times a week  Repeat DEXA scan every to 2 years not on treatment  Abstinent from alcohol    7. BMI 25.0-25.9,adult   normal  encouraged regular exercise 100 5300 minutes weekly, Mediterranean diet    8. Short-term memory loss  - PSYCHOLOGY - INTERNAL   schedule neuropsych testing  Hx of ADHD  Sleep is well-controlled since restless leg is controlled    9. Psychophysiological insomnia  - escitalopram 20 MG Oral Tab; Take 1 tablet (20 mg total) by mouth daily.  Dispense: 90 tablet; Refill: 3  - rOPINIRole 1 MG Oral Tab; Take 1 tablet (1 mg total) by mouth nightly.  Dispense: 90 tablet; Refill: 3  Controlled  Continue sleep hygiene    10. Thyroid nodule  - US THYROID (CPT=76536); Future  Due annual Bruno 3/2024    11. Screening for endocrine, nutritional, metabolic and immunity disorder  - CBC With Differential With Platelet  - Comp Metabolic Panel (14)  - TSH W Reflex To Free T4    12. Screening for cervical cancer  - ThinPrep PAP Smear; Future  - Hpv Dna  High Risk , Thin Prep Collect; Future  - ThinPrep PAP Smear  - Hpv Dna  High Risk , Thin Prep Collect    13. Encounter for screening mammogram for malignant neoplasm of  breast  - GUSTAVO LUAN 2D+3D SCREENING BILAT (CPT=77067/30102); Future      The patient indicates understanding of these issues and agrees to the plan.  Return in about 1 year (around 2/15/2025) for annual physical, medication monitoring,. Complete fasting labs ASAP for refill of rosuvastatin BP.    Diet counseling perfomed  Exercise counseling perfomed  Endometrial cancer awareness counseling performed    SUGGESTED VACCINATIONS - Influenza, Pneumococcal, Zoster, Tetanus     Immunization History   Administered Date(s) Administered    Covid-19 Vaccine Moderna 100 mcg/0.5 ml 04/05/2021, 05/03/2021, 01/07/2022    FLULAVAL 6 months & older 0.5 ml Prefilled syringe ([id number]) 10/10/2019, 09/17/2020    FLUZONE 6 months and older PFS 0.5 ml ([id number]) 11/17/2016, 10/10/2019, 09/17/2020, 09/30/2021    Fluvirin, 3 Years & >, Im 10/03/2013, 10/01/2014    Fluzone Vaccine Medicare () 11/22/2011    Influenza 01/19/2018, 09/21/2022    Pneumovax 23 09/17/2020    TDAP 11/22/2011    Td 06/21/2018       Influenza Annually   Pneumococcal if high risk   Td/Tdap once then every 10 years   HPV Females 11-26: 3 doses   Zoster (Shingles) 60 and older: one dose   Varicella 2 doses if not immune   MMR 1-2 doses if born after 1958 and not immune

## 2024-02-16 LAB — HPV I/H RISK 1 DNA SPEC QL NAA+PROBE: NEGATIVE

## 2024-02-19 ENCOUNTER — HOSPITAL ENCOUNTER (OUTPATIENT)
Dept: MAMMOGRAPHY | Facility: HOSPITAL | Age: 54
Discharge: HOME | End: 2024-02-19
Attending: FAMILY MEDICINE
Payer: COMMERCIAL

## 2024-02-19 DIAGNOSIS — Z12.31 SCREENING MAMMOGRAM, ENCOUNTER FOR: ICD-10-CM

## 2024-02-19 PROCEDURE — 77067 SCR MAMMO BI INCL CAD: CPT | Performed by: FAMILY MEDICINE

## 2024-02-19 PROCEDURE — 77063 BREAST TOMOSYNTHESIS BI: CPT | Performed by: FAMILY MEDICINE

## 2024-02-21 LAB
.: NORMAL
.: NORMAL

## 2024-02-22 ENCOUNTER — TELEPHONE (OUTPATIENT)
Dept: FAMILY MEDICINE CLINIC | Facility: CLINIC | Age: 54
End: 2024-02-22

## 2024-02-22 NOTE — TELEPHONE ENCOUNTER
LMOM to return call to the office. Provided pt office phone [phone] along with office hours.      Jael Galindo DO  2/21/2024  6:33 PM CST   Results reviewed. Released to [employer]  Jarrett Mims,  I have reviewed your test results.  Tests show no significant abnormality.  HPV is negative.  Pap smear result is still pending.  Sincerely,  Jael Galindo DO

## 2024-02-26 ENCOUNTER — TELEPHONE (OUTPATIENT)
Dept: PHYSICAL THERAPY | Facility: HOSPITAL | Age: 54
End: 2024-02-26

## 2024-02-28 ENCOUNTER — OFFICE VISIT (OUTPATIENT)
Dept: PHYSICAL THERAPY | Age: 54
End: 2024-02-28
Attending: FAMILY MEDICINE
Payer: COMMERCIAL

## 2024-02-28 DIAGNOSIS — M54.32 LEFT SIDED SCIATICA: Primary | ICD-10-CM

## 2024-02-28 DIAGNOSIS — M75.02 ADHESIVE CAPSULITIS OF LEFT SHOULDER: ICD-10-CM

## 2024-02-28 PROCEDURE — 97161 PT EVAL LOW COMPLEX 20 MIN: CPT

## 2024-02-28 NOTE — PROGRESS NOTES
SPINE EVALUATION:     Diagnosis:   Left sided sciatica (M54.32)  Adhesive capsulitis of left shoulder (M75.02)      Referring Provider: Josie  Date of Evaluation:    2/28/2024    Precautions:  None Next MD visit:   none scheduled  Date of Surgery: n/a     PATIENT SUMMARY   Felicita Jara is a 53 year old female who presents to therapy today with complaints of left sided lumbar pain that travels into the hip and around the front of the thigh stopping at the knee. Patient reports that the pain back pain began about 1 year ago, with the shooting pain starting shortly after. The only traumatic event that occurred relatively close to onset of symptoms was a fall six months prior to the onset of pain. She complains of flare ups occurring throughout the last year, with them becoming more intense in the last few months.   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 She is able to stand for roughly 30 minutes before symptoms increase. Sitting down relieves some of the pain right away, although there is lingering pain. Patient reports one instance of right anterior thigh numbness, described as her leg \"fell asleep\".    Pt describes pain level current 3/10, at best 1/10, at worst 7/10. Pain described as sharp, shooting, numbness/tingling   Current functional limitations include standing for prolonged periods of time for class,  Severity: moderate  Irritability: moderate  Nature of symptoms: inflammatory  Stage: acute flare up of chronic condition  Stability: moderate    24 hour pattern: lumbar tightness in the morning, loosens up throughout the day  Aggravating factors: prolonged standing  Relieving factors: sitting, medications    P's: P1 left lumbar pain P2 left hip pain P3 left anterior thigh pain     Felicita describes prior level of function fairly inactive. Pt goals include strengthening, pain relief.  Past medical history was reviewed with Felicita. Significant  findings include   Past Medical History:   Diagnosis Date    Abdominal distention     Alcoholism (HCC)     Allergic rhinitis 2 years +    Sinus headaches    Anxiety     Arthritis     Bloating     Colon polyp     Decorative tattoo     Depression     Diarrhea, unspecified     Essential hypertension     Hemorrhoids     IBS (irritable bowel syndrome)     Irregular bowel habits     Nausea     Obesity     Im working on it    Osteoarthritis Fingers    Stress     Trigger finger     Visual impairment     glasses    Weight gain    No previous back injuries, hip injuries.     Pt denies bowel/bladder changes, saddle anesthesia, and LENORE LE N/T.    ASSESSMENT  Felicita presents to physical therapy evaluation with primary c/o left sided lumbar pain and pain into the left anterior thigh. The results of the objective tests and measures show gross hip weakness, decreased AROM of hp ER and IR, and tightness of posterior chain.  Functional deficits include but are not limited to prolonged standing to complete classes.  Signs and symptoms are consistent with diagnosis of left sided lumbar radiculopathy. Pt and PT discussed evaluation findings, pathology, POC and HEP.  Pt voiced understanding and performs HEP correctly without reported pain. Skilled Physical Therapy is medically necessary to address the above impairments and reach functional goals.     OBJECTIVE:   Observation/Posture: Seated posture, slight rounded shoulders, increased thoracic kyphosis  Neuro Screen: SLR: R 60(improvement with abd, 65- not her symptoms) L 50 (improvement with abd, 60 - mild anterior thigh symptoms)  Slump test: R - L + 30 degrees from full knee extension, her symptoms were reproduced, improved with cervical extension  Light touch intact bilateral   Reflexes: Patellar R 2+ L 2+ Achilles R 2+ L 2+   Clonus R - L -   Hoffmans R - L -     Lumbar AROM: (* denotes performed with pain)  Flexion: 2 inches from floor (tightness in the hamstring), midrange flexion  causing reproduction of back pain  Extension: WFL  Sidebending: R WFL; L WFL  Rotation: R WFL; L WFL    Hip AROM:  Flexion: R WFL  L    Extension: R 10  L  Abduction: R WFL  L  Adduction: R WFL  L  ER: R 45  L 30* lateral hip/low back pain  IR:  R 20  L 10* lateral hip/low back pain    Palpation: tightness of bilateral erector spinae, mild tenderness to palpation     Strength: (* denotes performed with pain)  LE   Hip flexion (L2): R 3+/5; L 3+/5  Hip abduction: R 3+/5; L 3+/5  Hip Extension: R 3+/5; L 3+/5   Hip ER: R 3+/5; L 3+/5  Hip IR: R 3+/5; L 3+/5  Knee Flexion: R 5/5; L 5/5   Knee extension (L3): R 5/5; L 5/5   DF (L4): R 5/5; L 5/5  Great Toe Ext (L5): R 5/5, L 5/5  PF (S1): R 5/5; L 5/5     Flexibility:   LE   Hamstrings: R SLR 60 ; L SLR 50      Special tests:   Scour R - L -     Gait: pt ambulates on level ground with normal mechanics.  Balance: SLS R 40, L 40    Today’s Treatment and Response:   Pt education was provided on exam findings, treatment diagnosis, treatment plan, expectations, and prognosis. Pt was also provided recommendations for activity modifications, possible soreness after evaluation, and modalities as needed [ice/heat]  Patient was instructed in and issued a HEP for: Access Code: [email or web address]  URL: [email or web address]  Date: 02/28/2024  Prepared by: Yaakov Betancourt    Program Notes  Felicita Jara    Exercises  - Supine Lower Trunk Rotation  - 2 x daily - 7 x weekly - 2 sets - 10 reps  - Supine Single Knee to Chest Stretch  - 2 x daily - 7 x weekly - 4 sets - 15 hold  - Seated Hamstring Stretch  - 2 x daily - 7 x weekly - 4 sets - 30 hold  - Hooklying Lumbar Traction  - 2 x daily - 7 x weekly - 3 sets - 10 reps    Charges: PT Eval Low Complexity,    Total Treatment Time: 45 min     Based on clinical rationale and outcome measures, this evaluation involved Low Complexity decision making due to 1-2 personal factors/comorbidities, 3 body structures involved/activity limitations,  and evolving symptoms including changing pain levels.  PLAN OF CARE:    Goals: (to be met in 8 visits)   Patient will be independent with HEP in one week.  Patient will report decreased intensity of left anterior thigh symptoms for three consecutive days in 2 weeks to allow for completion of class work.   Patient will improve left hip ER to 45 degrees without reproduction of left lumbar and hip symptoms in 4 weeks.  Patient will demonstrate ability to stand for 45 minutes or more without increased lumbar symptoms to allow for completion of class in 4 weeks.  Patient will demonstrate ability to stand for 45 minutes or more without increased anterior thigh symptoms to allow for completion of class in 4 weeks.    Frequency / Duration: Patient will be seen for 2 x/week or a total of 8 visits over a 90 day period. Treatment will include: Manual Therapy, Mechanical Traction, Neuromuscular Re-education, Therapeutic Activities, Therapeutic Exercise, and Home Exercise Program instruction    Education or treatment limitation: None  Rehab Potential:good    Oswestry Disability Index Score  Score: 28 % (2/28/2024 10:42 AM)      Patient/Family/Caregiver was advised of these findings, precautions, and treatment options and has agreed to actively participate in planning and for this course of care.    Thank you for your referral. Please co-sign or sign and return this letter via fax as soon as possible to [phone]. If you have any questions, please contact me at Dept: [phone]    Sincerely,  Electronically signed by therapist: Yaakov Betancourt, PT    Physician's certification required: Yes  I certify the need for these services furnished under this plan of treatment and while under my care.    X___________________________________________________ Date____________________    Certification From: 2/28/2024  To:5/28/2024

## 2024-03-07 ENCOUNTER — APPOINTMENT (OUTPATIENT)
Dept: PHYSICAL THERAPY | Age: 54
End: 2024-03-07
Attending: FAMILY MEDICINE
Payer: COMMERCIAL

## 2024-03-07 ENCOUNTER — TELEPHONE (OUTPATIENT)
Dept: PHYSICAL THERAPY | Age: 54
End: 2024-03-07

## 2024-03-12 ENCOUNTER — APPOINTMENT (OUTPATIENT)
Dept: PHYSICAL THERAPY | Age: 54
End: 2024-03-12
Attending: FAMILY MEDICINE
Payer: COMMERCIAL

## 2024-03-14 ENCOUNTER — APPOINTMENT (OUTPATIENT)
Dept: PHYSICAL THERAPY | Age: 54
End: 2024-03-14
Attending: FAMILY MEDICINE
Payer: COMMERCIAL

## 2024-03-19 ENCOUNTER — OFFICE VISIT (OUTPATIENT)
Dept: PHYSICAL THERAPY | Age: 54
End: 2024-03-19
Attending: FAMILY MEDICINE
Payer: COMMERCIAL

## 2024-03-19 PROCEDURE — 97110 THERAPEUTIC EXERCISES: CPT

## 2024-03-19 NOTE — PROGRESS NOTES
Diagnosis:   Left sided sciatica (M54.32)  Adhesive capsulitis of left shoulder (M75.02)         Referring Provider: Josie  Date of Evaluation:    2/28/2024    Precautions:  None Next MD visit:   none scheduled  Date of Surgery: n/a   Insurance Primary/Secondary: [employer] / N/A     # Auth Visits: no auth, no limit            Subjective: I have been traveling lately, and haven't been sleeping in the most comfortable. Since I have been back in town, I have been feeling good. I try to do my stretches throughout my classes.    Pain: 2/10 L side lumbar ache      Objective: Slump: L - no reproduction   R - 60 L 65 (no reproduction of symptoms)  L ER 25   IR 30 no reproduction    Hip flexion: 4/5   Hip abduction: 4/5  Prone instability test: -       Assessment: Patient tolerated session well. Patient presented to session with mild complaints of left lumbar pain described as an ache. Patient reports that her pain has decreased since initial evaluation and that the stretches are helping. Patient states that she no longer has left lumbar sharp pain and that is mostly an ache at this time. Completion of the seated hamstring stretch lessens the intensity of the shooting left anterior thigh pain that seems to occur with prolonged sitting. Left anterior thigh symptoms were unable to be reproduced during session. Patient reports feeling of \"clunk\" in the left lumbar region, although no reports of pain. Negative prone instability test. Completed transversus abdominis activation exercise to address, which were added to HEP.       Goals: (to be met in 8 visits)   Patient will be independent with HEP in one week.MET  Patient will report decreased intensity of left anterior thigh symptoms for three consecutive days in 2 weeks to allow for completion of class work.   Patient will improve left hip ER to 45 degrees without reproduction of left lumbar and hip symptoms in 4 weeks.  Patient will demonstrate ability to stand  for 45 minutes or more without increased lumbar symptoms to allow for completion of class in 4 weeks.  Patient will demonstrate ability to stand for 45 minutes or more without increased anterior thigh symptoms to allow for completion of class in 4 weeks.    Plan: Continue with stretching and reassess progress with core activation.  Date: 3/19/2024  TX#: 2/8 Date:                 TX#: 3/ Date:                 TX#: 4/ Date:                 TX#: 5/ Date:   Tx#: 6/   TherEx - 45 min  Supine lower trunk rotations 2 x 10  Seated hamstring stretch 3 x 30 sec holds ea leg  Single knee to chest 2 x 10, 5 sec holds ea leg  Supine hamstring stretch with strap 3 x 30 sec holds   Transversus abdominis activation x 5, 5-10 sec holds                               HEP: Access Code: [email or web address]  URL: [email or web address]  Date: 03/19/2024  Prepared by: Yaakov Betancourt    Program Notes  Felicita Jara    Exercises  - Supine Lower Trunk Rotation  - 2 x daily - 7 x weekly - 2 sets - 10 reps  - Supine Single Knee to Chest Stretch  - 2 x daily - 7 x weekly - 4 sets - 15 hold  - Seated Hamstring Stretch  - 2 x daily - 7 x weekly - 4 sets - 30 hold  - Supine Transversus Abdominis Bracing - Hands on Stomach  - 1 x daily - 7 x weekly - 3 sets - 10 reps    Charges: 3 TherEx       Total Timed Treatment: 45 min  Total Treatment Time: 45 min

## 2024-03-21 ENCOUNTER — OFFICE VISIT (OUTPATIENT)
Dept: PHYSICAL THERAPY | Age: 54
End: 2024-03-21
Attending: FAMILY MEDICINE
Payer: COMMERCIAL

## 2024-03-21 PROCEDURE — 97110 THERAPEUTIC EXERCISES: CPT

## 2024-03-21 PROCEDURE — 97140 MANUAL THERAPY 1/> REGIONS: CPT

## 2024-03-21 NOTE — PROGRESS NOTES
Diagnosis:   Left sided sciatica (M54.32)  Adhesive capsulitis of left shoulder (M75.02)         Referring Provider: Josie  Date of Evaluation:    2/28/2024    Precautions:  None Next MD visit:   none scheduled  Date of Surgery: n/a   Insurance Primary/Secondary: [employer] / N/A     # Auth Visits: no auth, no limit            Subjective: the entire left side of the body is flared up this morning. Mostly in the neck and the low back. Longer day of classes wednesday     Pain: 5/10 L side lumbar achy now, 0/10 L lumbar region at completion of session       Objective:   R - 62 L 65 (no reproduction of symptoms)  L ER 30 reproduction of L sharp symptoms, 35 deg no symptoms following lateral distraction   IR 30 reprouction of L sharp symptoms, sharpness at 30 deg following lateral distraction  Hip scour: + achy lumbar symptoms, - following completion of lateral distraction    VALDEMAR: prior to lateral distraction unable to place L foot on R knee due to sharp lumbar pain; following lateral knee 16 inches from plinth, no pain      Assessment: Patient tolerated session well. Patient presented to session with moderate complaints of left lumbar symptoms that began this morning. Patient is unsure if increase in symptoms is due to longer day of classes yesterday or if she slept wrong. Reproduction of left lumbar symptoms were brought on with left hip ER and IR, left hip scour and moving into VALDEMAR position. Patient reported sharp pain in the lumbar region. Lateral distraction was completed, and following completion, patient demonstrated ability to move into VALDEMAR position without pain and increased left hp ER without symptom reproduction. Taught patient modified zhou stretch off the edge of the plinth to add in lateral component, which patient tolerated well. Patient continues to complain of \"clunking\" in the spine with certain movements, although was unable to reproduce today. Progressed patient with transversus  abdominis activation with movement. Patient instructed to perform transverse abdominis contraction next time she feels the \"clunk\" to assess if it changes the symptoms. Patient verbalized understanding.       Goals: (to be met in 8 visits)   Patient will be independent with HEP in one week.MET  Patient will report decreased intensity of left anterior thigh symptoms for three consecutive days in 2 weeks to allow for completion of class work. MET  Patient will improve left hip ER to 45 degrees without reproduction of left lumbar and hip symptoms in 4 weeks.  Patient will demonstrate ability to stand for 45 minutes or more without increased lumbar symptoms to allow for completion of class in 4 weeks.  Patient will demonstrate ability to stand for 45 minutes or more without increased anterior thigh symptoms to allow for completion of class in 4 weeks.    Plan: Continue with stretching, strengthening and core activation.  Date: 3/19/2024  TX#: 2/8 Date:3/21/2024               TX#: 3/8 Date:                 TX#: 4/ Date:                 TX#: 5/ Date:   Tx#: 6/   TherEx - 45 min  Supine lower trunk rotations 2 x 10  Seated hamstring stretch 3 x 30 sec holds ea leg  Single knee to chest 2 x 10, 5 sec holds ea leg  Supine hamstring stretch with strap 3 x 30 sec holds   Transversus abdominis activation x 5, 5-10 sec holds    TherEx 30 min  Supine LTR * sharp L sided pain d/c due to pain  Single knee to chest 2 x 10, 5 sec holds ea leg  Transversus abdominis activation x 10, 5 sec holds   Supine marches with transversus abdominis 2 x 10  Education about possible structures that could be causes symptoms, explanation of reasoning behind completion of objective measures and exercises.        Manual Therapy 10 min  Lateral distraction at L hip, grade 3 2 x 10 x 10 sec holds                    HEP: Access Code: [email or web address]  URL: [email or web address]  Date: 03/21/2024  Prepared by: Yaakov Betancourt    Program Notes  Michel CARMONA  [place]    Exercises  - Seated Hamstring Stretch  - 5 x daily - 7 x weekly - 5 sets - 15 hold  - Walking Hamstring Stretch  - 5 x daily - 7 x weekly - 5 sets - 15 hold  - Supine Hamstring Stretch with Strap  - 2 x daily - 7 x weekly - 5 sets - 15  hold  - Gastroc Stretch on Wall  - 5 x daily - 7 x weekly - 5 sets - 15 hold  - Supine March  - 2 x daily - 7 x weekly - 2 sets - 10 reps  - Modified Bradley Stretch  - 2 x daily - 7 x weekly - 3 sets - 15 hold    Charges: 2 TherEx 1 MT      Total Timed Treatment: 40 min  Total Treatment Time: 40 min

## 2024-03-26 ENCOUNTER — OFFICE VISIT (OUTPATIENT)
Dept: PHYSICAL THERAPY | Age: 54
End: 2024-03-26
Attending: FAMILY MEDICINE
Payer: COMMERCIAL

## 2024-03-26 PROCEDURE — 97110 THERAPEUTIC EXERCISES: CPT

## 2024-03-26 PROCEDURE — 97140 MANUAL THERAPY 1/> REGIONS: CPT

## 2024-03-26 NOTE — PROGRESS NOTES
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 So my back felt a little worse on Sunday.    Pain: 6/10 L side lumbar achy, 0/10 following lateral distraction      Objective:   SLR R - 80 L 80 (no reproduction of symptoms)  Active SLR R: 70 L: 70 deg, click in low back occurs at 50 deg  Left IR 42 reprouction of L low back sharp symptoms; 42 following completion of lateral distraction, no reproduction of symptoms  Hip scour: negative, no reproduction of symptoms    PA's along L1-S1, no reproduction of symptoms, no hypo- or hypermobility revealed    Assessment: Patient tolerated session well. Patient presented to session with moderate complaints of left lumbar symptoms that began this morning. Patient is unsure if increase in symptoms is due to longer day of classes yesterday or if she slept wrong. Reproduction of left lumbar symptoms were brought on with left hip IR. Patient reported sharp pain in the lumbar region. Lateral distraction was completed, and following completion, patient demonstrated left IR without symptom reproduction. Educated patient to continue with modified zohu stretch. Progression of transversus abdominis activation with functional movements were progressed. Patient was able to complete full L active SLR without \"clunking\" while completing core activation.       Goals: (to be met in 8 visits)   Patient will be independent with HEP in one week.MET  Patient will report decreased intensity of left anterior thigh symptoms for three consecutive days in 2 weeks to allow for  completion of class work. MET  Patient will improve left hip ER to 45 degrees without reproduction of left lumbar and hip symptoms in 4 weeks.  Patient will demonstrate ability to stand for 45 minutes or more without increased lumbar symptoms to allow for completion of class in 4 weeks.  Patient will demonstrate ability to stand for 45 minutes or more without increased anterior thigh symptoms to allow for completion of class in 4 weeks.    Plan: Continue with stretching, strengthening and core activation.  Date: 3/19/2024  TX#: 2/8 Date:3/21/2024               TX#: 3/8 Date:3/26/2024                 TX#: 4/8 Date:                 TX#: 5/ Date:   Tx#: 6/   TherEx - 45 min  Supine lower trunk rotations 2 x 10  Seated hamstring stretch 3 x 30 sec holds ea leg  Single knee to chest 2 x 10, 5 sec holds ea leg  Supine hamstring stretch with strap 3 x 30 sec holds   Transversus abdominis activation x 5, 5-10 sec holds    TherEx 30 min  Supine LTR * sharp L sided pain d/c due to pain  Single knee to chest 2 x 10, 5 sec holds ea leg  Transversus abdominis activation x 10, 5 sec holds   Supine marches with transversus abdominis 2 x 10  Education about possible structures that could be causes symptoms, explanation of reasoning behind completion of objective measures and exercises.  TherEx  Single knee to chest 2 x 5, 10 sec holds ea leg  Supine LTR 2 x 5  Transversus abdominis activation x 10, 5 secs  Supine marches with transversus abdominis 2 x 10   SLR with transversus abdominis contraction 2 x 5        Manual Therapy 10 min  Lateral distraction at L hip, grade 3 2 x 10 x 10 sec holds Manual Therapy 15 min  Lateral distraction at L hip, grade 3, 2 x 10 x 10 sec holds  Grade 3 PA's to L1-S1                   HEP:Access Code: [email or web address]  URL: [email or web address]  Date: 03/26/2024  Prepared by: Yaakov Betancourt    Program Notes  Felicita Jara    Exercises  - Supine Lower Trunk Rotation  - 2 x daily - 7 x weekly - 2  sets - 10 reps  - Supine Single Knee to Chest Stretch  - 2 x daily - 7 x weekly - 4 sets - 15 hold  - Seated Hamstring Stretch  - 2 x daily - 7 x weekly - 4 sets - 30 hold  - Supine Transversus Abdominis Bracing - Hands on Stomach  - 2 x daily - 7 x weekly - 1 sets - 10 reps  - Supine March  - 2 x daily - 7 x weekly - 2 sets - 10 reps  - Modified Bradley Stretch  - 1 x daily - 7 x weekly - 3 sets - 10 reps    Charges: 2 TherEx 1 MT      Total Timed Treatment: 45 min  Total Treatment Time: 45 min

## 2024-03-28 ENCOUNTER — OFFICE VISIT (OUTPATIENT)
Dept: PHYSICAL THERAPY | Age: 54
End: 2024-03-28
Attending: FAMILY MEDICINE
Payer: COMMERCIAL

## 2024-03-28 PROCEDURE — 97110 THERAPEUTIC EXERCISES: CPT

## 2024-03-28 PROCEDURE — 97140 MANUAL THERAPY 1/> REGIONS: CPT

## 2024-03-28 NOTE — PROGRESS NOTES
Diagnosis:   Left sided sciatica (M54.32)  Adhesive capsulitis of left shoulder (M75.02)         Referring Provider: Josie  Date of Evaluation:    2/28/2024    Precautions:  None Next MD visit:   none scheduled  Date of Surgery: n/a   Insurance Primary/Secondary: [employer] / N/A     # Auth Visits: no auth, no limit            Subjective: I had to do some work on the wheel yesterday for a couple of hours. I was able to get up and move throughout.     Pain: 2/10 L side lumbar achy, 0/10 following lateral distraction      Objective:   SLR R - 80 L 80 (no reproduction of symptoms)  Active SLR R: 70 L: 70 deg, no clicking  Left ER 46, no reproduction of symptoms  Hip scour: negative, no reproduction of symptoms    Assessment: Patient tolerated session well. Patient presented to session with mild complaints of lumbar achiness. Patient reports that she did more wheel throwing during class yesterday, and that could be why the symptoms returned following treatment on Tuesday. Patient demonstrated improvement of left hip ER at start of session with no reports of lumbar symptoms. Following completion of lateral hip distraction and long axis distraction, the patient reports abolishment of all lumbar symptoms. Patient inquried about teaching  how to complete. Therapist instructed patient to bring  to next session, and will complete a small training session. Therefore distraction can be completed at home. At this time, the patient has met all the goals in relation to the lumbar symptoms, and would like to progress and focus on the bilateral shoulder pain.       Goals: (to be met in 8 visits)   Patient will be independent with HEP in one week.MET  Patient will report decreased intensity of left anterior thigh symptoms for three consecutive days in 2 weeks to allow for completion of class work. MET  Patient will improve left hip ER to 45 degrees without reproduction of left lumbar and hip symptoms in 4  weeks. MET  Patient will demonstrate ability to stand for 45 minutes or more without increased lumbar symptoms to allow for completion of class in 4 weeks. MET  Patient will demonstrate ability to stand for 45 minutes or more without increased anterior thigh symptoms to allow for completion of class in 4 weeks. MET    Plan: Continue with stretching, core strengthening, and family training at next session. Complete POC of lumbar sx. Following session with be tailored towards the shoulder  Date: 3/19/2024  TX#: 2/8 Date:3/21/2024               TX#: 3/8 Date:3/26/2024                 TX#: 4/8 Date:3/28/2024                 TX#: 5/8 Date:   Tx#: 6/   TherEx - 45 min  Supine lower trunk rotations 2 x 10  Seated hamstring stretch 3 x 30 sec holds ea leg  Single knee to chest 2 x 10, 5 sec holds ea leg  Supine hamstring stretch with strap 3 x 30 sec holds   Transversus abdominis activation x 5, 5-10 sec holds    TherEx 30 min  Supine LTR * sharp L sided pain d/c due to pain  Single knee to chest 2 x 10, 5 sec holds ea leg  Transversus abdominis activation x 10, 5 sec holds   Supine marches with transversus abdominis 2 x 10  Education about possible structures that could be causes symptoms, explanation of reasoning behind completion of objective measures and exercises.  TherEx  Single knee to chest 2 x 5, 10 sec holds ea leg  Supine LTR 2 x 5  Transversus abdominis activation x 10, 5 secs  Supine marches with transversus abdominis 2 x 10   SLR with transversus abdominis contraction 2 x 5   TherEx -30 min  Seated hamstring stretch 4 x 30 sec hold ea leg  Single knee to chest 2 x 5, 10 sec holds ea leg   Transversus abdominis activation x 10, 5 secs  Seated marches with transversus abdominis 2 x 10  SLR with transversus abdominis contraction 2 x 10  Standing marches with transversus abdominis 2 x 10       Manual Therapy 10 min  Lateral distraction at L hip, grade 3 2 x 10 x 10 sec holds Manual Therapy 15 min  Lateral  distraction at L hip, grade 3, 2 x 10 x 10 sec holds  Grade 3 PA's to L1-S1 Manual therapy 15 min  Lateral distraction at L hip, grade 3, 2 x 10 x 10 sec holds  Long axis distraction at L hip, grade 3, 2 x 10 x 10 sec holds                   HEP:Access Code: [email or web address]  URL: [email or web address]  Date: 03/28/2024  Prepared by: Yaakov Betancourt    Program Notes  Felicita Jara    Exercises  - Supine Lower Trunk Rotation  - 2 x daily - 7 x weekly - 2 sets - 10 reps  - Supine Single Knee to Chest Stretch  - 2 x daily - 7 x weekly - 4 sets - 15 hold  - Seated Hamstring Stretch  - 2 x daily - 7 x weekly - 4 sets - 30 hold  - Supine Transversus Abdominis Bracing - Hands on Stomach  - 2 x daily - 7 x weekly - 1 sets - 10 reps  - Supine March  - 2 x daily - 7 x weekly - 2 sets - 10 reps  - Modified Bradley Stretch  - 1 x daily - 7 x weekly - 3 sets - 10 reps  - Standing March  - 2 x daily - 7 x weekly - 2 sets - 10 reps    Charges: 2 TherEx 1 MT      Total Timed Treatment: 45 min  Total Treatment Time: 45 min

## 2024-04-04 ENCOUNTER — OFFICE VISIT (OUTPATIENT)
Dept: PHYSICAL THERAPY | Age: 54
End: 2024-04-04
Attending: FAMILY MEDICINE
Payer: COMMERCIAL

## 2024-04-04 PROCEDURE — 97110 THERAPEUTIC EXERCISES: CPT

## 2024-04-04 PROCEDURE — 97140 MANUAL THERAPY 1/> REGIONS: CPT

## 2024-04-04 NOTE — PROGRESS NOTES
Diagnosis:   Left sided sciatica (M54.32)  Adhesive capsulitis of left shoulder (M75.02)         Referring Provider: Josie  Date of Evaluation:    2/28/2024    Precautions:  None Next MD visit:   none scheduled  Date of Surgery: n/a   Insurance Primary/Secondary: [employer] / N/A     # Auth Visits: no auth, no limit           Discharge Summary  Pt has attended 6 visits in Physical Therapy.     Subjective: I have been feeling pretty sore this morning, I haven't done my exercises. My back feels so much better than when we started. I feel like I am 90% back to normal like prior to the injury    Pain: 2/10 L side lumbar achy, 0/10 following lateral distraction      Objective:   SLR R - 80 L 80 (no reproduction of symptoms)  Active SLR R: 74 L: 75 deg, no clicking  Hip scour: negative, no reproduction of symptoms  Strength  Hip flexion (L2): R 5/5; L 5/5  Hip abduction: R 5/5; L 5/5  Hip Extension: R 5/5; L 5/5            Hip ER: R 5/5; L 5/5  Hip IR: R 5/5; L 5/5  Knee Flexion: R 5/5; L 5/5            Knee extension (L3): R 5/5; L 5/5            DF (L4): R 5/5; L 5/5  Great Toe Ext (L5): R 5/5, L 5/5  PF (S1): R 5/5; L 5/5    Assessment: Patient tolerated session well. Patient presents with mild complaints of lumbar soreness on the left side. Completion of stretches and lateral distraction were able to subside the patient's symptoms. Patient's  was present during session for training on proper mechanics of lateral distraction at the left hip and long axis distraction, with and without the belt. As these are two interventions that completely alleviate the patient's symptoms. Patient has met all therapy goals as related to the lumbar symptoms. Patient demonstrated a 20% improvement on the RADHA from initial evaluation to discharge. At this time, patient is discharged from therapy for lumbar symptoms. Evaluation for left shoulder pain will be completed at next session, with plan of care to follow.        Goals: (to be met in 8 visits)   Patient will be independent with HEP in one week.MET  Patient will report decreased intensity of left anterior thigh symptoms for three consecutive days in 2 weeks to allow for completion of class work. MET  Patient will improve left hip ER to 45 degrees without reproduction of left lumbar and hip symptoms in 4 weeks. MET  Patient will demonstrate ability to stand for 45 minutes or more without increased lumbar symptoms to allow for completion of class in 4 weeks. MET  Patient will demonstrate ability to stand for 45 minutes or more without increased anterior thigh symptoms to allow for completion of class in 4 weeks. MET  Post Oswestry Disability Index Score  Post Score: 8 % (4/4/2024 10:25 AM)    20 % improvement    Plan: Discharge from skilled therapy for lumbar symptoms. Evaluation for shoulder adhesive capsulitis    Patient/Family/Caregiver was advised of these findings, precautions, and treatment options and has agreed to actively participate in planning and for this course of care.    Thank you for your referral. If you have any questions, please contact me at Dept: [phone].    Sincerely,  Electronically signed by therapist: Yaakov Betancourt PT     Physician's certification required:  Yes  Please co-sign or sign and return this letter via fax as soon as possible to [phone].   I certify the need for these services furnished under this plan of treatment and while under my care.    X___________________________________________________ Date____________________    Certification From: 4/4/2024  To:7/3/2024     Plan: Continue with stretching, core strengthening, and family training at next session. Complete POC of lumbar sx. Following session with be tailored towards the shoulder  Date: 3/19/2024  TX#: 2/8 Date:3/21/2024               TX#: 3/8 Date:3/26/2024                 TX#: 4/8 Date:3/28/2024                 TX#: 5/8 Date:4/4/2024   Tx#: 6/8   TherEx - 45  min  Supine lower trunk rotations 2 x 10  Seated hamstring stretch 3 x 30 sec holds ea leg  Single knee to chest 2 x 10, 5 sec holds ea leg  Supine hamstring stretch with strap 3 x 30 sec holds   Transversus abdominis activation x 5, 5-10 sec holds    TherEx 30 min  Supine LTR * sharp L sided pain d/c due to pain  Single knee to chest 2 x 10, 5 sec holds ea leg  Transversus abdominis activation x 10, 5 sec holds   Supine marches with transversus abdominis 2 x 10  Education about possible structures that could be causes symptoms, explanation of reasoning behind completion of objective measures and exercises.  TherEx  Single knee to chest 2 x 5, 10 sec holds ea leg  Supine LTR 2 x 5  Transversus abdominis activation x 10, 5 secs  Supine marches with transversus abdominis 2 x 10   SLR with transversus abdominis contraction 2 x 5   TherEx -30 min  Seated hamstring stretch 4 x 30 sec hold ea leg  Single knee to chest 2 x 5, 10 sec holds ea leg   Transversus abdominis activation x 10, 5 secs  Seated marches with transversus abdominis 2 x 10  SLR with transversus abdominis contraction 2 x 10  Standing marches with transversus abdominis 2 x 10   TherEx- 25 min  Single knee to chest 2 x 5, 10 sec holds ea leg  Supine LTR 2 x 10  Transversus abdominis activation x 10, 5 secs  Seated marches with transversus abdominis 2 x 10  SLR with transversus abdominis contraction 2 x 10  Standing marches with transversus abdominis 2 x 10    Manual Therapy 10 min  Lateral distraction at L hip, grade 3 2 x 10 x 10 sec holds Manual Therapy 15 min  Lateral distraction at L hip, grade 3, 2 x 10 x 10 sec holds  Grade 3 PA's to L1-S1 Manual therapy 15 min  Lateral distraction at L hip, grade 3, 2 x 10 x 10 sec holds  Long axis distraction at L hip, grade 3, 2 x 10 x 10 sec holds  Manual therapy 15 min  Lateral distraction at L hip, grade 3, 2 x 10 x 10 sec holds  Long axis distraction at L hip, grade 3, 2 x 10 x 10 sec holds                   HEP:Access Code: [email or web address]  URL: [email or web address]  Date: 03/28/2024  Prepared by: Yaakov Betancourt    Program Notes  Felicita Jara    Exercises  - Supine Lower Trunk Rotation  - 2 x daily - 7 x weekly - 2 sets - 10 reps  - Supine Single Knee to Chest Stretch  - 2 x daily - 7 x weekly - 4 sets - 15 hold  - Seated Hamstring Stretch  - 2 x daily - 7 x weekly - 4 sets - 30 hold  - Supine Transversus Abdominis Bracing - Hands on Stomach  - 2 x daily - 7 x weekly - 1 sets - 10 reps  - Supine March  - 2 x daily - 7 x weekly - 2 sets - 10 reps  - Modified Bradley Stretch  - 1 x daily - 7 x weekly - 3 sets - 10 reps  - Standing March  - 2 x daily - 7 x weekly - 2 sets - 10 reps    Charges: 2 TherEx 1 MT      Total Timed Treatment: 45 min  Total Treatment Time: 45 min

## 2024-04-19 RX ORDER — ROSUVASTATIN CALCIUM 20 MG/1
20 TABLET, COATED ORAL NIGHTLY
Qty: 90 TABLET | Refills: 0 | OUTPATIENT
Start: 2024-04-19

## 2024-04-19 NOTE — TELEPHONE ENCOUNTER
Refill Failed Protocol:     Pt requesting refill of   Requested Prescriptions     Refused Prescriptions Disp Refills    ROSUVASTATIN 20 MG Oral Tab [Pharmacy Med Name: ROSUVASTATIN 20MG TABLETS] 90 tablet 0     Sig: TAKE 1 TABLET(20 MG) BY MOUTH EVERY NIGHT     Last Time Medication was Filled:  1/23/2024    Last Office Visit with Provider: 2/15/2024    Unable to approve refill,   Cholesterol Medication Protocol [id number]/2024 02:04 PM   Protocol Details ALT < 80    ALT resulted within past year    Lipid panel within past 12 months    In person appointment or virtual visit in the past 12 mos or appointment in next 3 mos     \No future appointments.

## 2024-04-22 NOTE — TELEPHONE ENCOUNTER
Refill Failed Protocol:     Pt requesting refill of   Requested Prescriptions     Pending Prescriptions Disp Refills    rosuvastatin 20 MG Oral Tab 90 tablet 0     Sig: Take 1 tablet (20 mg total) by mouth nightly.          Last Time Medication was Filled:  01/23/2024    Last Office Visit with Provider: 02/15/2024    Unable to approve refill,   Requested Prescriptions     Pending Prescriptions Disp Refills    rosuvastatin 20 MG Oral Tab 90 tablet 0     Sig: Take 1 tablet (20 mg total) by mouth nightly.      Sent to Provider for review     ALT < 80    ALT resulted within past year    Lipid panel within past 12 months    In person appointment or virtual visit in the past 12 mos or appointment in next 3 mos          No future appointments.  Patient is to return to office in 1 year 02/2025

## 2024-04-23 ENCOUNTER — APPOINTMENT (OUTPATIENT)
Dept: PHYSICAL THERAPY | Age: 54
End: 2024-04-23
Attending: FAMILY MEDICINE
Payer: COMMERCIAL

## 2024-04-24 RX ORDER — ROSUVASTATIN CALCIUM 20 MG/1
20 TABLET, COATED ORAL NIGHTLY
Qty: 30 TABLET | Refills: 0 | Status: SHIPPED | OUTPATIENT
Start: 2024-04-24

## 2024-04-25 NOTE — TELEPHONE ENCOUNTER
Patient is requesting rosuvastatin has been seen but has not completed her fasting labs.  Please inform her 30-day supply of rosuvastatin will be given and she needs to complete her labs for further refills.    Thank you

## 2024-05-03 ENCOUNTER — OFFICE VISIT (OUTPATIENT)
Dept: PHYSICAL THERAPY | Age: 54
End: 2024-05-03
Attending: FAMILY MEDICINE
Payer: COMMERCIAL

## 2024-05-03 PROCEDURE — 97161 PT EVAL LOW COMPLEX 20 MIN: CPT

## 2024-05-03 NOTE — PROGRESS NOTES
SHOULDER EVALUATION:     Diagnosis:   Adhesive capsulitis of left shoulder (M75.02)       Referring Provider: Jael Galindo  Date of Evaluation:    5/3/2024    Precautions:  None Next MD visit:   none scheduled  Date of Surgery: n/a     PATIENT SUMMARY   Felicita Jara is a 53 year old female who presents to therapy today with complaints of bilateral shoulder pain that began years ago. Left shoulder was diagnosed with adhesive capsulitis. Patient had PT order for the shoulders years ago, and only completed a few sessions. Right shoulder: location of pain is on superior aspect of shoulder, shooting pain. Left shoulder: location of pain is on superior and anterior aspect of shoulder into biceps on occasion, shooting pain. Patient reports tightness on the left side of the cervical spine.   Unable to sleep on either side. ( If I try to lay either side without shooting pain into the shoulder)  Pt describes pain level current 0/10, at best 0/10, at worst 10/10. Pain described as shooting, sharp. Pain does not linger once out of provocative position.  Current functional limitations include painting//drawing without pain, lifting, sleeping on the side.   Severity: high  Irritability: moderate  Nature of symptoms: mechanical/inflammatory  Stage: chronic  Stability: moderate    24 hour pattern: no reports of differences   Aggravating factors: lifting, painting  Relieving factors: changing of position     P's: P1 right superior shoulder pain P2 left superior shoulder pain P3 left anterior shoulder pain  Felicita describes prior level of function fairly active. Pt goals include increase AROM, decrease pain.  Past medical history was reviewed with Felicita. Significant findings include   Past Medical History:    Abdominal distention    Alcoholism (HCC)    Allergic rhinitis    Sinus headaches    Anxiety    Arthritis    Bloating    Colon polyp    Decorative tattoo    Depression    Diarrhea, unspecified    Essential  hypertension    Hemorrhoids    IBS (irritable bowel syndrome)    Irregular bowel habits    Nausea    Obesity    Im working on it    Osteoarthritis    Stress    Trigger finger    Visual impairment    glasses    Weight gain       ASSESSMENT  Felicita presents to physical therapy evaluation with primary c/o bilateral shoulder pain. The results of the objective tests and measures show decreased range of motion, decreased strength, positive subacromial syndrome test.  Functional deficits include but are not limited to reaching overhead, completion of art class tasks without compensation or pain.  Signs and symptoms are consistent with diagnosis of bilateral adhesive capsulitis. Pt and PT discussed evaluation findings, pathology, POC and HEP.  Pt voiced understanding and performs HEP correctly without reported pain. Skilled Physical Therapy is medically necessary to address the above impairments and reach functional goals.     OBJECTIVE:   Observation/Posture: rounded forward shoulders  Palpation: no pain with palpation  Sensation: bilateral UE sensation intact  Cervical Screen: overpressure/quadrants. No reproduction of shoulder sx    AROM: (* denotes performed with pain)  Shoulder  Elbow   Flexion: R 125*P1; L 130*P2  Abduction: R 90* P1; L 85*P2  ER: R 35*; L 40  IR: R 60*; L 70 Flexion: R WFL; L WFL  Extension: R WFL; L WFL  Supination: R WFL, L WFL  Pronation: R WFL, L WFL     Accessory motion: hypomobile with inferior and posterior mobilization in bilateral shoulders    Flexibility: pec minor tight    Strength/MMT: (* denotes performed with pain)  Shoulder Elbow   Flexion: R 3+/5; L 3+/5  Abduction: R 3+*P1/5; L 3+*P2/5  ER: R 3+/5; L 3+/5  IR: R 3+/5; L 3+/5 Flexion: R 5/5; L 5/5  Extension: R 5/5; L 5/5  Supination: R 5/5; L 5/5  Pronation: R 5/5; L 5/5      Special tests:     Subacromial Pain Syndrome:  Empty Can test: R + L+  Painful Arc Sign: R +, L +  External Rotation Resistance: R +, L +  Trang  Impingement Sign: R +, L +  Neer Impingement Test: R +, L +  Palpable Tenderness Infraspinatus and Supraspinatus: R -, L -      Today’s Treatment and Response:   Pt education was provided on exam findings, treatment diagnosis, treatment plan, expectations, and prognosis. Pt was also provided recommendations for activity modifications, possible soreness after evaluation, modalities as needed [ice/heat], postural corrections, and importance of remaining active.  Patient was instructed in and issued a HEP for: Access Code: [email or web address]  URL: [email or web address]  Date: 05/03/2024  Prepared by: Yaakov Betancourt    Program Notes  Felicita Jara    Exercises  - Seated Scapular Retraction  - 5-6 x daily - 7 x weekly - 2 sets - 10 reps - 3 hold  - Shoulder Flexion Wall Slide with Towel  - 3 x daily - 7 x weekly - 2 sets - 10 reps - 10 hold  - Seated Shoulder Abduction Towel Slide at Table Top  - 3 x daily - 7 x weekly - 2 sets - 10 reps - 5 hold  - Modified sleeper stretch(seated)  4 x day, 5 reps 15 holds    Charges: PT Eval Low Complexity,         Total Treatment Time: 40 min     Based on clinical rationale and outcome measures, this evaluation involved Low Complexity decision making due to 1-2 personal factors/comorbidities, 3 body structures involved/activity limitations, and evolving symptoms including changing pain levels.  PLAN OF CARE:    Goals: (to be met in 12 visits)   Pt will report improved ability to sleep without waking due to shoulder pain   Pt will improve shoulder flexion AROM to >150 degrees to be able to reach into overhead cabinets without pain or restriction   Pt will improve shoulder abduction AROM to >120 degrees to improve ability to don deodorant, don/doff shirts, and wash hair   Pt will increase shoulder AROM ER to 85 to be able to reach and fasten seatbelt   Pt will increase shoulder AROM IR to 70 to be able to reach in back pocket, tuck in shirt, and turn steering wheel without  pain  Pt will improve shoulder strength throughout to 4/5 to improve function with lifting, art school requirements   Pt will demonstrate increased mid/low trap strength to 5/5 to promote improved shoulder mechanics and stabilization with lifting and reaching   Pt will be independent and compliant with comprehensive HEP to maintain progress achieved in PT       Frequency / Duration: Patient will be seen for 2 x/week or a total of 12 visits over a 90 day period. Treatment will include: Manual Therapy, Neuromuscular Re-education, Therapeutic Activities, Therapeutic Exercise, and Home Exercise Program instruction    Education or treatment limitation: None  Rehab Potential:good      Patient/Family/Caregiver was advised of these findings, precautions, and treatment options and has agreed to actively participate in planning and for this course of care.    Thank you for your referral. Please co-sign or sign and return this letter via fax as soon as possible to [phone]. If you have any questions, please contact me at Dept: [phone]    Sincerely,  Electronically signed by therapist: Yaakov Betancourt, PT  Physician's certification required: Yes  I certify the need for these services furnished under this plan of treatment and while under my care.    X___________________________________________________ Date____________________    Certification From: 5/3/2024  To:8/1/2024

## 2024-05-07 ENCOUNTER — OFFICE VISIT (OUTPATIENT)
Dept: PHYSICAL THERAPY | Age: 54
End: 2024-05-07
Attending: FAMILY MEDICINE
Payer: COMMERCIAL

## 2024-05-07 PROCEDURE — 97110 THERAPEUTIC EXERCISES: CPT

## 2024-05-07 NOTE — PROGRESS NOTES
Diagnosis:   Adhesive capsulitis of left shoulder (M75.02)             Referring Provider: Jael Galindo  Date of Evaluation:    5/3/2024    Precautions:  None Next MD visit:   none scheduled  Date of Surgery: n/a   Insurance Primary/Secondary: [facility] / N/A     # Auth Visits: no auth, no limit            Subjective: I did have the left shoulder get stuck while I was doing the scapular retractions. There was a lot of pain but it subsided fairly quickly. There is still a lot going on at home so I haven't been able to get the exercises done everyday    Pain: 0/10, 3/10 bilateral shoulders (posterior/superior aspect) with movement      Objective: Shoulder AROM  Flexion: R 122*, 125 at end of session L 125*, 128 at end of session  Abduction: R 95*, 98 at end of session  L 110*, 110 at end of session      Assessment: patient tolerated session fairly well. Patient presented to session with no complaints of shoulder pain at rest, and mild complaints of pain with certain movements. Patient demonstrated slightly improved AROM in bilateral shoulders compared to evaluation. Patient reported that there is a lot going on at home, and she has found it difficult to find time to complete full HEP. She states that she is trying to get through some in her free time. Expressed the importance of completing full HEP on progression with therapy. Explained to patient that it is always an option to put therapy on hold. Patient verbalized understanding and stated that she is going to start putting in effort to getting HEP done. Patient's HEP will remain the same to help with consistency.       Goals: (to be met in 12 visits)   Pt will report improved ability to sleep without waking due to shoulder pain   Pt will improve shoulder flexion AROM to >150 degrees to be able to reach into overhead cabinets without pain or restriction   Pt will improve shoulder abduction AROM to >120 degrees to improve ability to don deodorant,  don/doff shirts, and wash hair   Pt will increase shoulder AROM ER to 85 to be able to reach and fasten seatbelt   Pt will increase shoulder AROM IR to 70 to be able to reach in back pocket, tuck in shirt, and turn steering wheel without pain  Pt will improve shoulder strength throughout to 4/5 to improve function with lifting, art school requirements   Pt will demonstrate increased mid/low trap strength to 5/5 to promote improved shoulder mechanics and stabilization with lifting and reaching   Pt will be independent and compliant with comprehensive HEP to maintain progress achieved in PT        Plan: continue with stretching, AAROM, AROM for bilateral shoulders   Date: 5/7/2024  TX#: 2/12 Date:                 TX#: 3/ Date:                 TX#: 4/ Date:                 TX#: 5/ Date:   Tx#: 6/   TherEx - 45 min  Arm Bike level 1, 3 min forward, 3 backward  Pulleys into flexion x 2 min  Pulleys into scaption x 2 min   Pulleys into abduction x 2 min  Seated scapular retractions 2 x 15  Towel slides into flexion on wall 2 x 15  Modified sleeper stretch 4 x 15 sec ea arm   Seated towel slides into abduction on table x 10                             HEP:  Access Code: [email or web address]  URL: [email or web address]  Date: 05/07/2024  Prepared by: Yaakov Betancourt    Program Notes  Felicita Jara    Exercises  - Seated Scapular Retraction  - 5-6 x daily - 7 x weekly - 2 sets - 10 reps - 3 hold  - Shoulder Flexion Wall Slide with Towel  - 3 x daily - 7 x weekly - 2 sets - 10 reps - 10 hold  - Seated Shoulder Abduction Towel Slide at Table Top  - 3 x daily - 7 x weekly - 2 sets - 10 reps - 5 hold  - Standing Shoulder Row with Anchored Resistance  - 3 x daily - 7 x weekly - 2 sets - 10 reps  - Modified sleeper stretch(seated)  4 x day, 5 reps 15 holds    Charges: 3 TherEx       Total Timed Treatment: 45 min  Total Treatment Time: 45 min

## 2024-05-09 PROBLEM — E78.00 PURE HYPERCHOLESTEROLEMIA: Status: RESOLVED | Noted: 2021-02-01 | Resolved: 2024-05-09

## 2024-05-21 ENCOUNTER — OFFICE VISIT (OUTPATIENT)
Dept: PHYSICAL THERAPY | Age: 54
End: 2024-05-21
Attending: FAMILY MEDICINE

## 2024-05-21 PROCEDURE — 97140 MANUAL THERAPY 1/> REGIONS: CPT

## 2024-05-21 PROCEDURE — 97110 THERAPEUTIC EXERCISES: CPT

## 2024-05-21 NOTE — PROGRESS NOTES
Diagnosis:   Adhesive capsulitis of left shoulder (M75.02)             Referring Provider: Jael Galindo  Date of Evaluation:    5/3/2024    Precautions:  None Next MD visit:   none scheduled  Date of Surgery: n/a   Insurance Primary/Secondary: [employer] [facility] / N/A     # Auth Visits: no auth, no limit            Subjective: I feel like the left shoulder is continuing to get the feeling of being stuck with certain activities. Getting dressed and showered this morning went well no shoulder pain.     Pain: 0/10 bilateral shoulders      Objective: Shoulder AROM/PROM  Flexion: R 130/145*, 140/145  L 140/145*, 148/150 at end of session  Abduction: R 70*/72*, 92/96*  L 94*/98*, 104/106 at end of session  Behind the back IR: 14 inches to C7 bilaterally  ER: R 65* L 68*  IR: R 34* L 45    Assessment: patient tolerated session fairly well. Patient presented to session with no complaints of shoulder pain at rest, and mild complaints of pain with certain movements. Patient demonstrated improvement of bilateral shoulder range of motion following completion of MT and AAROM exercises. Patient reports that personal things have settled at home, and she has been more consistent with HEP. AAROM for shoulder flexion, abduction and ER were attempted with dowel and well tolerated, therefore added to HEP. Provided continued education on importance of icing.       Goals: (to be met in 12 visits)   Pt will report improved ability to sleep without waking due to shoulder pain   Pt will improve shoulder flexion AROM to >150 degrees to be able to reach into overhead cabinets without pain or restriction   Pt will improve shoulder abduction AROM to >120 degrees to improve ability to don deodorant, don/doff shirts, and wash hair   Pt will increase shoulder AROM ER to 85 to be able to reach and fasten seatbelt   Pt will increase shoulder AROM IR to 70 to be able to reach in back pocket, tuck in shirt, and turn steering wheel without pain  Pt  will improve shoulder strength throughout to 4/5 to improve function with lifting, art school requirements   Pt will demonstrate increased mid/low trap strength to 5/5 to promote improved shoulder mechanics and stabilization with lifting and reaching   Pt will be independent and compliant with comprehensive HEP to maintain progress achieved in PT        Plan: continue with stretching, AAROM, AROM for bilateral shoulders, MT for posterior capsule/inferior capsule tightness.  Date: 5/7/2024  TX#: 2/12 Date:5/21/2024                 TX#: 3/12 Date:                 TX#: 4/ Date:                 TX#: 5/ Date:   Tx#: 6/   TherEx - 45 min  Arm Bike level 1, 3 min forward, 3 backward  Pulleys into flexion x 2 min  Pulleys into scaption x 2 min   Pulleys into abduction x 2 min  Seated scapular retractions 2 x 15  Towel slides into flexion on wall 2 x 15  Modified sleeper stretch 4 x 15 sec ea arm   Seated towel slides into abduction on table x 10  TherEx - 30 min   Supine posterior capsule stretch 3 x 30 sec holds ea arm   Supine shoulder flexion with dowel x 10  Supine shoulder abduction with dowel x 10 ea arm  Supine shoulder ER with dowel x 10 ea arm          Manual Therapy 15 min  Posterior mobs grade II-IV to bilateral shoulder  Inferior mob grade II (grade III-IV discontinued due to pain)  PROM bilateral IR  and ER                     HEP:  Access Code: [email or web address]  URL: [email or web address]  Date: 05/07/2024  Prepared by: Yaakov Betancourt    Program Notes  Felicita Jara    Exercises  - Seated Scapular Retraction  - 5-6 x daily - 7 x weekly - 2 sets - 10 reps - 3 hold  - Shoulder Flexion Wall Slide with Towel  - 3 x daily - 7 x weekly - 2 sets - 10 reps - 10 hold  - Seated Shoulder Abduction Towel Slide at Table Top  - 3 x daily - 7 x weekly - 2 sets - 10 reps - 5 hold  - Standing Shoulder Row with Anchored Resistance  - 3 x daily - 7 x weekly - 2 sets - 10 reps  - Modified sleeper stretch(seated)  4  x day, 5 reps 15 holds    Charges: 2 TherEx 1 MT       Total Timed Treatment: 45 min  Total Treatment Time: 45 min

## 2024-05-22 DIAGNOSIS — G25.81 RESTLESS LEG SYNDROME: ICD-10-CM

## 2024-05-22 DIAGNOSIS — F51.04 PSYCHOPHYSIOLOGICAL INSOMNIA: ICD-10-CM

## 2024-05-22 RX ORDER — ROPINIROLE 1 MG/1
1 TABLET, FILM COATED ORAL NIGHTLY
Qty: 90 TABLET | Refills: 0 | Status: SHIPPED | OUTPATIENT
Start: 2024-05-22

## 2024-05-22 RX ORDER — ROSUVASTATIN CALCIUM 20 MG/1
20 TABLET, COATED ORAL NIGHTLY
Qty: 30 TABLET | Refills: 0 | Status: SHIPPED | OUTPATIENT
Start: 2024-05-22

## 2024-05-22 NOTE — TELEPHONE ENCOUNTER
Refill Passed Protocol:     Pt requesting refill of   Requested Prescriptions     Pending Prescriptions Disp Refills    rOPINIRole 1 MG Oral Tab 90 tablet 0     Sig: Take 1 tablet (1 mg total) by mouth nightly.     Refill was approved and sent to pharmacy:   . Restless leg syndrome   Restart ropirinole  Last Time Medication was Filled:  2/15/2024    Last Office Visit with Provider: 5/09/2024    No future appointments.

## 2024-05-22 NOTE — TELEPHONE ENCOUNTER
Refill Failed Protocol:     Pt requesting refill of   Requested Prescriptions     Pending Prescriptions Disp Refills    rosuvastatin 20 MG Oral Tab 30 tablet 0     Sig: Take 1 tablet (20 mg total) by mouth nightly.      Last Time Medication was Filled:  4/24/2024    Last Office Visit with Provider: 5/9/2024    Unable to approve refill,   Cholesterol Medication Protocol [id number]/2024 04:05 PM   Protocol Details ALT < 80    ALT resulted within past year    Lipid panel within past 12 months    In person appointment or virtual visit in the past 12 mos or appointment in next 3 mos     Mixed hyperlipidemia  Restart Rosuvastatin  encourage mediterranean diet  Exercise brisk walk 30-60 mins at least 5 days weekly  Lose weight if overweight  Recheck fasting labs in 3-4 weeks     No future appointments.

## 2024-05-23 ENCOUNTER — OFFICE VISIT (OUTPATIENT)
Dept: PHYSICAL THERAPY | Age: 54
End: 2024-05-23
Attending: FAMILY MEDICINE

## 2024-05-23 PROCEDURE — 97140 MANUAL THERAPY 1/> REGIONS: CPT

## 2024-05-23 PROCEDURE — 97110 THERAPEUTIC EXERCISES: CPT

## 2024-05-23 NOTE — PROGRESS NOTES
Diagnosis:   Adhesive capsulitis of left shoulder (M75.02)             Referring Provider: Jael Galindo  Date of Evaluation:    5/3/2024    Precautions:  None Next MD visit:   none scheduled  Date of Surgery: n/a   Insurance Primary/Secondary: [employer] / N/A     # Auth Visits: no auth, no limit            Subjective: the shoulders are feeling sore. The exercises are going well. The episodes of getting \"stuck\" hasn't occurred.     Pain: 0/10 bilateral shoulders      Objective: Shoulder AROM/PROM  Flexion: R 130/145*, 140/145  L 140/145*, 148/150 at end of session  Abduction: R 89*, 92/96*  L 110*, 110/116 at end of session  Behind the back IR: 14 inches to C7 bilaterally  ER: R 65* L 68*  IR: R 34* L 45    Assessment: patient tolerated session fairly well. Patient presented to session with no complaints of shoulder pain at rest, and mild complaints of pain with certain movements. Patient reports that both shoulders have been sore, as she has been increasing completion of exercises at home. HEP will remain the same to allow for compliance. Provided continued education on importance of icing. Provided education and answered questions on shoulder anatomy.       Goals: (to be met in 12 visits)   Pt will report improved ability to sleep without waking due to shoulder pain   Pt will improve shoulder flexion AROM to >150 degrees to be able to reach into overhead cabinets without pain or restriction   Pt will improve shoulder abduction AROM to >120 degrees to improve ability to don deodorant, don/doff shirts, and wash hair   Pt will increase shoulder AROM ER to 85 to be able to reach and fasten seatbelt   Pt will increase shoulder AROM IR to 70 to be able to reach in back pocket, tuck in shirt, and turn steering wheel without pain  Pt will improve shoulder strength throughout to 4/5 to improve function with lifting, art school requirements   Pt will demonstrate increased mid/low trap strength to 5/5 to promote  improved shoulder mechanics and stabilization with lifting and reaching   Pt will be independent and compliant with comprehensive HEP to maintain progress achieved in PT        Plan: continue with stretching, AAROM, AROM for bilateral shoulders, MT for posterior capsule/inferior capsule tightness.  Date: 5/7/2024  TX#: 2/12 Date:5/21/2024                 TX#: 3/12 Date:5/23/2024                 TX#: 4/12 Date:                 TX#: 5/ Date:   Tx#: 6/   TherEx - 45 min  Arm Bike level 1, 3 min forward, 3 backward  Pulleys into flexion x 2 min  Pulleys into scaption x 2 min   Pulleys into abduction x 2 min  Seated scapular retractions 2 x 15  Towel slides into flexion on wall 2 x 15  Modified sleeper stretch 4 x 15 sec ea arm   Seated towel slides into abduction on table x 10  TherEx - 30 min   Supine posterior capsule stretch 3 x 30 sec holds ea arm   Supine shoulder flexion with dowel x 10  Supine shoulder abduction with dowel x 10 ea arm  Supine shoulder ER with dowel x 10 ea arm    TherEx - 30 min  Pulleys into flexion x 2 min  Pulleys into scaption x 2 min   Pulleys into abduction x 2 min  Supine shoulder flexion with dowel x 10  Supine shoulder abduction with dowel x 10 ea arm  Supine shoulder ER with dowel x 10 ea arm  Supine posterior capsule stretch 3 x 30 sec holds ea arm   Answered questions and provided education on shoulder anatomy       Manual Therapy 15 min  Posterior mobs grade II-IV to bilateral shoulder  Inferior mob grade II (grade III-IV discontinued due to pain)  PROM bilateral IR  and ER  Manual Therapy 15 min  Posterior mobs grade II-IV to bilateral shoulder  PROM bilateral IR  and ER   PROM scaption plane bilateral shoulders                   HEP:  Access Code: [email or web address]  URL: [email or web address]  Date: 05/07/2024  Prepared by: Yaakov Betancourt    Program Notes  Felicita Jara    Exercises  - Seated Scapular Retraction  - 5-6 x daily - 7 x weekly - 2 sets - 10 reps - 3  hold  - Shoulder Flexion Wall Slide with Towel  - 3 x daily - 7 x weekly - 2 sets - 10 reps - 10 hold  - Seated Shoulder Abduction Towel Slide at Table Top  - 3 x daily - 7 x weekly - 2 sets - 10 reps - 5 hold  - Standing Shoulder Row with Anchored Resistance  - 3 x daily - 7 x weekly - 2 sets - 10 reps  - Modified sleeper stretch(seated)  4 x day, 5 reps 15 holds    Charges: 2 TherEx 1 MT       Total Timed Treatment: 45 min  Total Treatment Time: 45 min

## 2024-06-04 ENCOUNTER — OFFICE VISIT (OUTPATIENT)
Dept: PHYSICAL THERAPY | Age: 54
End: 2024-06-04
Attending: FAMILY MEDICINE
Payer: COMMERCIAL

## 2024-06-04 PROCEDURE — 97140 MANUAL THERAPY 1/> REGIONS: CPT

## 2024-06-04 PROCEDURE — 97110 THERAPEUTIC EXERCISES: CPT

## 2024-06-04 NOTE — PROGRESS NOTES
Diagnosis:   Adhesive capsulitis of left shoulder (M75.02)             Referring Provider: No ref. provider found  Date of Evaluation:    5/3/2024    Precautions:  None Next MD visit:   none scheduled  Date of Surgery: n/a   Insurance Primary/Secondary: [employer] / N/A     # Auth Visits: no auth, no limit            Subjective: The shoulders are feeling stiff this morning. No pain.     Pain: 0/10 bilateral shoulders      Objective: Shoulder AROM/PROM  Flexion: R 145/145, 140/145  L 145/145, 148/150 at end of session  Abduction: R 90*/96*, 92/96*  L 115*/115*, 110/116 at end of session  Behind the back IR: 14 inches to C7 bilaterally  ER: R 65* L 68*  IR: R 34* L 45    Assessment: patient tolerated session fairly well. Patient presented to session with no complaints of shoulder pain at rest, and mild complaints of pain with certain movements. Patient reports that she has been icing and has felt a difference. Patient reports that she has gotten better with fully completing HEP, but is not completing it each day. Patient begins carmine throwing class this evening. Patient continues to find relief following MT.       Goals: (to be met in 12 visits)   Pt will report improved ability to sleep without waking due to shoulder pain   Pt will improve shoulder flexion AROM to >150 degrees to be able to reach into overhead cabinets without pain or restriction   Pt will improve shoulder abduction AROM to >120 degrees to improve ability to don deodorant, don/doff shirts, and wash hair   Pt will increase shoulder AROM ER to 85 to be able to reach and fasten seatbelt   Pt will increase shoulder AROM IR to 70 to be able to reach in back pocket, tuck in shirt, and turn steering wheel without pain  Pt will improve shoulder strength throughout to 4/5 to improve function with lifting, art school requirements   Pt will demonstrate increased mid/low trap strength to 5/5 to promote improved shoulder mechanics and stabilization with  lifting and reaching   Pt will be independent and compliant with comprehensive HEP to maintain progress achieved in PT        Plan: Assess how carmine throwing went. Progress into strengthening   Date: 5/7/2024  TX#: 2/12 Date:5/21/2024                 TX#: 3/12 Date:5/23/2024                 TX#: 4/12 Date:6/4/2024                 TX#: 5/12 Date:   Tx#: 6/   TherEx - 45 min  Arm Bike level 1, 3 min forward, 3 backward  Pulleys into flexion x 2 min  Pulleys into scaption x 2 min   Pulleys into abduction x 2 min  Seated scapular retractions 2 x 15  Towel slides into flexion on wall 2 x 15  Modified sleeper stretch 4 x 15 sec ea arm   Seated towel slides into abduction on table x 10  TherEx - 30 min   Supine posterior capsule stretch 3 x 30 sec holds ea arm   Supine shoulder flexion with dowel x 10  Supine shoulder abduction with dowel x 10 ea arm  Supine shoulder ER with dowel x 10 ea arm    TherEx - 30 min  Pulleys into flexion x 2 min  Pulleys into scaption x 2 min   Pulleys into abduction x 2 min  Supine shoulder flexion with dowel x 10  Supine shoulder abduction with dowel x 10 ea arm  Supine shoulder ER with dowel x 10 ea arm  Supine posterior capsule stretch 3 x 30 sec holds ea arm   Answered questions and provided education on shoulder anatomy  TherEx - 30 min  Supine shoulder flexion with dowel x 20   Supine shoulder abduction with dowel x 10 ea arm  Supine shoulder ER with dowel x 10 ea arm  Pulleys into flexion x 2 min  Pulleys into scaption x 2 min   Pulleys into abduction x 2 min       Manual Therapy 15 min  Posterior mobs grade II-IV to bilateral shoulder  Inferior mob grade II (grade III-IV discontinued due to pain)  PROM bilateral IR  and ER  Manual Therapy 15 min  Posterior mobs grade II-IV to bilateral shoulder  PROM bilateral IR  and ER   PROM scaption plane bilateral shoulders Manual Therapy 15 min  Posterior mobs grade II-IV to bilateral shoulder  PROM bilateral IR  and ER   PROM scaption plane  bilateral shoulders                  HEP:  Access Code: [email or web address]  URL: [email or web address]  Date: 05/07/2024  Prepared by: Yaakov Betancourt    Program Notes  Felicita Jara    Exercises  - Seated Scapular Retraction  - 5-6 x daily - 7 x weekly - 2 sets - 10 reps - 3 hold  - Shoulder Flexion Wall Slide with Towel  - 3 x daily - 7 x weekly - 2 sets - 10 reps - 10 hold  - Seated Shoulder Abduction Towel Slide at Table Top  - 3 x daily - 7 x weekly - 2 sets - 10 reps - 5 hold  - Standing Shoulder Row with Anchored Resistance  - 3 x daily - 7 x weekly - 2 sets - 10 reps  - Modified sleeper stretch(seated)  4 x day, 5 reps 15 holds    Charges: 2 TherEx 1 MT       Total Timed Treatment: 45 min  Total Treatment Time: 45 min

## 2024-06-11 ENCOUNTER — OFFICE VISIT (OUTPATIENT)
Dept: PHYSICAL THERAPY | Age: 54
End: 2024-06-11
Attending: Other
Payer: COMMERCIAL

## 2024-06-11 PROCEDURE — 97140 MANUAL THERAPY 1/> REGIONS: CPT

## 2024-06-11 PROCEDURE — 97110 THERAPEUTIC EXERCISES: CPT

## 2024-06-11 NOTE — PROGRESS NOTES
Diagnosis:   Adhesive capsulitis of left shoulder (M75.02)             Referring Provider: No ref. provider found  Date of Evaluation:    5/3/2024    Precautions:  None Next MD visit:   none scheduled  Date of Surgery: n/a   Insurance Primary/Secondary: [employer] [facility] / N/A     # Auth Visits: no auth, no limit            Subjective: There is a little bit of pain on the on the right shoulder. The clicking on the left shoulder.      Pain: 0/10 bilateral shoulders      Objective: Shoulder AROM/PROM  Flexion: R 145/145,  L 145/145,   Abduction: R 95/105,  L 150*/150*,  Behind the back IR: 14 inches to C7 bilaterally  ER: R 65* L 68*  IR: R 34* L 45    Assessment: patient tolerated session fairly well. Patient presented to session with no complaints of shoulder pain at rest, and mild complaints of pain with certain movements. Patient reports that she has been icing and has felt a difference. Patient reports that she has gotten better with fully completing HEP, but is not completing it each day. Patient reports that the carmine throwing class went well last week with no complaints of shoulder pain. Patient continues to having clicking in the left shoulder, although reports of decreased occurrences since beginning therapy.       Goals: (to be met in 12 visits)   Pt will report improved ability to sleep without waking due to shoulder pain   Pt will improve shoulder flexion AROM to >150 degrees to be able to reach into overhead cabinets without pain or restriction   Pt will improve shoulder abduction AROM to >120 degrees to improve ability to don deodorant, don/doff shirts, and wash hair   Pt will increase shoulder AROM ER to 85 to be able to reach and fasten seatbelt   Pt will increase shoulder AROM IR to 70 to be able to reach in back pocket, tuck in shirt, and turn steering wheel without pain  Pt will improve shoulder strength throughout to 4/5 to improve function with lifting, art school requirements   Pt will  demonstrate increased mid/low trap strength to 5/5 to promote improved shoulder mechanics and stabilization with lifting and reaching   Pt will be independent and compliant with comprehensive HEP to maintain progress achieved in PT        Plan: Assess AROM. Progress into strengthening with bands    Date: 5/7/2024  TX#: 2/12 Date:5/21/2024                 TX#: 3/12 Date:5/23/2024                 TX#: 4/12 Date:6/4/2024                 TX#: 5/12 Date:6/11/2024   Tx#: 6/12   TherEx - 45 min  Arm Bike level 1, 3 min forward, 3 backward  Pulleys into flexion x 2 min  Pulleys into scaption x 2 min   Pulleys into abduction x 2 min  Seated scapular retractions 2 x 15  Towel slides into flexion on wall 2 x 15  Modified sleeper stretch 4 x 15 sec ea arm   Seated towel slides into abduction on table x 10  TherEx - 30 min   Supine posterior capsule stretch 3 x 30 sec holds ea arm   Supine shoulder flexion with dowel x 10  Supine shoulder abduction with dowel x 10 ea arm  Supine shoulder ER with dowel x 10 ea arm    TherEx - 30 min  Pulleys into flexion x 2 min  Pulleys into scaption x 2 min   Pulleys into abduction x 2 min  Supine shoulder flexion with dowel x 10  Supine shoulder abduction with dowel x 10 ea arm  Supine shoulder ER with dowel x 10 ea arm  Supine posterior capsule stretch 3 x 30 sec holds ea arm   Answered questions and provided education on shoulder anatomy  TherEx - 30 min  Supine shoulder flexion with dowel x 20   Supine shoulder abduction with dowel x 10 ea arm  Supine shoulder ER with dowel x 10 ea arm  Pulleys into flexion x 2 min  Pulleys into scaption x 2 min   Pulleys into abduction x 2 min   TherEx - 25 min  Pulleys into flexion x 3 min  Pulley into scaption x 3 min  Pulleys into abduction x 3 min   Posterior capsule stretch 3 x 30 sec holds ea arm  Supine shoulder flexion x 10          Manual Therapy 15 min  Posterior mobs grade II-IV to bilateral shoulder  Inferior mob grade II (grade III-IV  discontinued due to pain)  PROM bilateral IR  and ER  Manual Therapy 15 min  Posterior mobs grade II-IV to bilateral shoulder  PROM bilateral IR  and ER   PROM scaption plane bilateral shoulders Manual Therapy 15 min  Posterior mobs grade II-IV to bilateral shoulder  PROM bilateral IR  and ER   PROM scaption plane bilateral shoulders Manual Therapy 20 min  Posterior mobs grade II-IV to bilateral shoulder  PROM bilateral IR  and ER   PROM flexion plane bilateral shoulders  PROM abduction plane bilateral shoulders                 HEP: Access Code: [email or web address]  URL: [email or web address]  Date: 06/11/2024  Prepared by: Yaakov Betancourt    Program Notes  Felicita Jara    Exercises  - Seated Scapular Retraction  - 5-6 x daily - 7 x weekly - 2 sets - 10 reps - 3 hold  - Shoulder Flexion Wall Slide with Towel  - 3 x daily - 7 x weekly - 2 sets - 10 reps - 10 hold  - Seated Shoulder Abduction Towel Slide at Table Top  - 3 x daily - 7 x weekly - 2 sets - 10 reps - 5 hold  - Standing Shoulder Row with Anchored Resistance  - 3 x daily - 7 x weekly - 2 sets - 10 reps  - Supine Shoulder Flexion Extension AAROM with Dowel  - 3 x daily - 7 x weekly - 2 sets - 10 reps - 3-5 hold  - Supine Shoulder Abduction AAROM with Dowel  - 3 x daily - 7 x weekly - 2 sets - 10 reps - 3-5 hold  - Supine Shoulder External Rotation with Dowel  - 3 x daily - 7 x weekly - 2 sets - 10 reps - 3-5 hold  - Supine Shoulder Flexion Extension AAROM with Dowel  - 3 x daily - 7 x weekly - 2 sets - 10 reps  - Supine Shoulder Abduction AAROM with Dowel  - 3 x daily - 7 x weekly - 2 sets - 10 reps  - Supine Shoulder External Rotation in 45 Degrees Abduction AAROM with Dowel  - 3 x daily - 7 x weekly - 2 sets - 10 reps    Charges: 2 TherEx 1 MT       Total Timed Treatment: 45 min  Total Treatment Time: 45 min

## 2024-06-13 ENCOUNTER — APPOINTMENT (OUTPATIENT)
Dept: PHYSICAL THERAPY | Age: 54
End: 2024-06-13
Payer: COMMERCIAL

## 2024-06-17 ENCOUNTER — APPOINTMENT (OUTPATIENT)
Dept: PHYSICAL THERAPY | Age: 54
End: 2024-06-17
Payer: COMMERCIAL

## 2024-06-19 ENCOUNTER — OFFICE VISIT (OUTPATIENT)
Dept: PHYSICAL THERAPY | Age: 54
End: 2024-06-19
Attending: FAMILY MEDICINE
Payer: COMMERCIAL

## 2024-06-19 PROCEDURE — 97110 THERAPEUTIC EXERCISES: CPT

## 2024-06-19 PROCEDURE — 97140 MANUAL THERAPY 1/> REGIONS: CPT

## 2024-06-19 NOTE — PROGRESS NOTES
Diagnosis:   Adhesive capsulitis of left shoulder (M75.02)             Referring Provider: No ref. provider found  Date of Evaluation:    5/3/2024    Precautions:  None Next MD visit:   none scheduled  Date of Surgery: n/a   Insurance Primary/Secondary: [facility] / N/A     # Auth Visits: no auth, no limit            Subjective: I had some difficulty with sleeping last night. I think that it might be due to throwing during class yesterday. My class is over for the summer. The exercises are going well.      Pain: 0/10 bilateral shoulders      Objective: Shoulder AROM/PROM  Flexion: R 145/150,  L 145/150   Abduction: R 115/130 ,  L 150*/150*,  Behind the back IR: 14 inches to C7 bilaterally  ER: R 65* L 68*  IR: R 34* L 45    Assessment: patient tolerated session fairly well. Patient presented to session with no complaints of shoulder pain at rest, and mild complaints of pain with certain movements. Patient tolerated progression to strengthening well. Patient demonstrates mild improvement of bilateral shoulder flexion and abduction. Patient's HEP progressed.     Goals: (to be met in 12 visits)   Pt will report improved ability to sleep without waking due to shoulder pain   Pt will improve shoulder flexion AROM to >150 degrees to be able to reach into overhead cabinets without pain or restriction   Pt will improve shoulder abduction AROM to >120 degrees to improve ability to don deodorant, don/doff shirts, and wash hair   Pt will increase shoulder AROM ER to 85 to be able to reach and fasten seatbelt   Pt will increase shoulder AROM IR to 70 to be able to reach in back pocket, tuck in shirt, and turn steering wheel without pain  Pt will improve shoulder strength throughout to 4/5 to improve function with lifting, art school requirements   Pt will demonstrate increased mid/low trap strength to 5/5 to promote improved shoulder mechanics and stabilization with lifting and reaching   Pt will be independent and  compliant with comprehensive HEP to maintain progress achieved in PT        Plan: continue with strengthening, education on timeline and stretching.   Date: 5/7/2024  TX#: 2/12 Date:5/21/2024                 TX#: 3/12 Date:5/23/2024                 TX#: 4/12 Date:6/4/2024                 TX#: 5/12 Date:6/11/2024   Tx#: 6/12 Date:6/19/2024  Tx#:7/12   TherEx - 45 min  Arm Bike level 1, 3 min forward, 3 backward  Pulleys into flexion x 2 min  Pulleys into scaption x 2 min   Pulleys into abduction x 2 min  Seated scapular retractions 2 x 15  Towel slides into flexion on wall 2 x 15  Modified sleeper stretch 4 x 15 sec ea arm   Seated towel slides into abduction on table x 10  TherEx - 30 min   Supine posterior capsule stretch 3 x 30 sec holds ea arm   Supine shoulder flexion with dowel x 10  Supine shoulder abduction with dowel x 10 ea arm  Supine shoulder ER with dowel x 10 ea arm    TherEx - 30 min  Pulleys into flexion x 2 min  Pulleys into scaption x 2 min   Pulleys into abduction x 2 min  Supine shoulder flexion with dowel x 10  Supine shoulder abduction with dowel x 10 ea arm  Supine shoulder ER with dowel x 10 ea arm  Supine posterior capsule stretch 3 x 30 sec holds ea arm   Answered questions and provided education on shoulder anatomy  TherEx - 30 min  Supine shoulder flexion with dowel x 20   Supine shoulder abduction with dowel x 10 ea arm  Supine shoulder ER with dowel x 10 ea arm  Pulleys into flexion x 2 min  Pulleys into scaption x 2 min   Pulleys into abduction x 2 min   TherEx - 25 min  Pulleys into flexion x 3 min  Pulley into scaption x 3 min  Pulleys into abduction x 3 min   Posterior capsule stretch 3 x 30 sec holds ea arm  Supine shoulder flexion x 10       TherEx - 30 min  Arm bike level 1, 2 min forward, 2 min backward  Pulleys into flexion x 3 min  Pulleys into abduction x 3 min  Supine shoulder flexion 2 x 10  Banded rows GTB 2 x 10  Scapular retractions GTB 2 x 10   Banded IR GTB x 10  Banded  ER GTB x 10        Manual Therapy 15 min  Posterior mobs grade II-IV to bilateral shoulder  Inferior mob grade II (grade III-IV discontinued due to pain)  PROM bilateral IR  and ER  Manual Therapy 15 min  Posterior mobs grade II-IV to bilateral shoulder  PROM bilateral IR  and ER   PROM scaption plane bilateral shoulders Manual Therapy 15 min  Posterior mobs grade II-IV to bilateral shoulder  PROM bilateral IR  and ER   PROM scaption plane bilateral shoulders Manual Therapy 20 min  Posterior mobs grade II-IV to bilateral shoulder  PROM bilateral IR  and ER   PROM flexion plane bilateral shoulders  PROM abduction plane bilateral shoulders Manual Therapy 15 min  Posterior mobs grade II-IV to bilateral shoulder  PROM flexion plane bilateral shoulders  PROM abduction plane bilateral shoulders                   HEP: Access Code: [email or web address]  URL: [email or web address]  Date: 06/19/2024  Prepared by: Yaakov Betancourt    Program Notes  Felicita Jaar    Exercises  - Seated Scapular Retraction  - 5-6 x daily - 7 x weekly - 2 sets - 10 reps - 3 hold  - Shoulder Flexion Wall Slide with Towel  - 3 x daily - 7 x weekly - 2 sets - 10 reps - 10 hold  - Seated Shoulder Abduction Towel Slide at Table Top  - 3 x daily - 7 x weekly - 2 sets - 10 reps - 5 hold  - Supine Shoulder Flexion Extension AAROM with Dowel  - 3 x daily - 7 x weekly - 2 sets - 10 reps - 3-5 hold  - Supine Shoulder Abduction AAROM with Dowel  - 3 x daily - 7 x weekly - 2 sets - 10 reps - 3-5 hold  - Supine Shoulder External Rotation with Dowel  - 3 x daily - 7 x weekly - 2 sets - 10 reps - 3-5 hold  - Supine Shoulder Flexion Extension AAROM with Dowel  - 3 x daily - 7 x weekly - 2 sets - 10 reps  - Supine Shoulder Abduction AAROM with Dowel  - 3 x daily - 7 x weekly - 2 sets - 10 reps  - Supine Shoulder External Rotation in 45 Degrees Abduction AAROM with Dowel  - 3 x daily - 7 x weekly - 2 sets - 10 reps  - Standing Shoulder Row with Anchored  Resistance  - 3 x daily - 7 x weekly - 2 sets - 15 reps  - Scapular Retraction with Resistance Advanced  - 3 x daily - 7 x weekly - 2 sets - 15 reps  - Shoulder Internal Rotation with Resistance  - 3 x daily - 7 x weekly - 2 sets - 10 reps  - Shoulder External Rotation with Anchored Resistance  - 3 x daily - 7 x weekly - 2 sets - 10 reps    Charges: 2 TherEx 1 MT       Total Timed Treatment: 45 min  Total Treatment Time: 45 min

## 2024-06-24 NOTE — TELEPHONE ENCOUNTER
Refill Failed Protocol:     Pt requesting refill of   Requested Prescriptions     Pending Prescriptions Disp Refills    ROSUVASTATIN 20 MG Oral Tab [Pharmacy Med Name: ROSUVASTATIN 20MG TABLETS] 30 tablet 0     Sig: TAKE 1 TABLET(20 MG) BY MOUTH EVERY NIGHT     Last Time Medication was Filled:  5/22/2024    Last Office Visit with Provider: 5/9/2024    Unable to approve refill,   Cholesterol Medication Protocol [id number]/2024 12:43 PM   Protocol Details ALT < 80    ALT resulted within past year    Lipid panel within past 12 months    In person appointment or virtual visit in the past 12 mos or appointment in next 3 mos          No future appointments.

## 2024-06-25 RX ORDER — ROSUVASTATIN CALCIUM 20 MG/1
20 TABLET, COATED ORAL NIGHTLY
Qty: 90 TABLET | Refills: 0 | Status: SHIPPED | OUTPATIENT
Start: 2024-06-25

## 2024-07-02 ENCOUNTER — APPOINTMENT (OUTPATIENT)
Dept: PHYSICAL THERAPY | Age: 54
End: 2024-07-02
Payer: COMMERCIAL

## 2024-07-11 ENCOUNTER — OFFICE VISIT (OUTPATIENT)
Dept: PHYSICAL THERAPY | Age: 54
End: 2024-07-11
Attending: FAMILY MEDICINE
Payer: COMMERCIAL

## 2024-07-11 PROCEDURE — 97140 MANUAL THERAPY 1/> REGIONS: CPT

## 2024-07-11 PROCEDURE — 97110 THERAPEUTIC EXERCISES: CPT

## 2024-07-11 NOTE — PROGRESS NOTES
Diagnosis:   Adhesive capsulitis of left shoulder (M75.02)             Referring Provider: No ref. provider found  Date of Evaluation:    5/3/2024    Precautions:  None Next MD visit:   none scheduled  Date of Surgery: n/a   Insurance Primary/Secondary: [employer] [facility] / N/A     # Auth Visits: no auth, no limit            Subjective: Due to my recent rib injury I have had to change how I am sleeping. The shoulders are feeling pretty good. The last week has been a lot better with the left rib hip. I have been trying to keep up with the shoulder exercises, but the band exercises are too much at this point.   Still experiencing the popping in the left shoulder, still having pain with the popping. No locking     Pain: 0/10 bilateral shoulders      Objective: Shoulder AROM/PROM  Flexion: R 145/150 ; 150/155,  L 145/150 ; 150/160  Abduction: R 130/140 ; 140/150,  L 150/180 ; 160/180  Behind the back IR: 14 inches to C7 bilaterally  ER: R 65 L 65      Assessment: patient tolerated session fairly well. Patient presented to session with no complaints of shoulder pain at rest, and mild complaints of pain with overpressures. Treatment plan was slightly altered due to new rib pain. Patient reported that she has not been completing the full HEP in fear that it would cause the rib pain to increase. Patient tolerated all exercises without rib pain or compensation. Patient instructed to schedule further appointments.     Goals: (to be met in 12 visits)   Pt will report improved ability to sleep without waking due to shoulder pain MET  Pt will improve shoulder flexion AROM to >150 degrees to be able to reach into overhead cabinets without pain or restriction   Pt will improve shoulder abduction AROM to >120 degrees to improve ability to don deodorant, don/doff shirts, and wash hair   Pt will increase shoulder AROM ER to 85 to be able to reach and fasten seatbelt   Pt will increase shoulder AROM IR to 70 to be able to reach in  back pocket, tuck in shirt, and turn steering wheel without pain  Pt will improve shoulder strength throughout to 4/5 to improve function with lifting, art school requirements   Pt will demonstrate increased mid/low trap strength to 5/5 to promote improved shoulder mechanics and stabilization with lifting and reaching   Pt will be independent and compliant with comprehensive HEP to maintain progress achieved in PT MET       Plan: continue with strengthening, assess return to resistance exercises.   Date: 5/7/2024  TX#: 2/12 Date:5/21/2024                 TX#: 3/12 Date:5/23/2024                 TX#: 4/12 Date:6/4/2024                 TX#: 5/12 Date:6/11/2024   Tx#: 6/12 Date:6/19/2024  Tx#:7/12 Date:7/11/2024  Tx#: 8/12   TherEx - 45 min  Arm Bike level 1, 3 min forward, 3 backward  Pulleys into flexion x 2 min  Pulleys into scaption x 2 min   Pulleys into abduction x 2 min  Seated scapular retractions 2 x 15  Towel slides into flexion on wall 2 x 15  Modified sleeper stretch 4 x 15 sec ea arm   Seated towel slides into abduction on table x 10  TherEx - 30 min   Supine posterior capsule stretch 3 x 30 sec holds ea arm   Supine shoulder flexion with dowel x 10  Supine shoulder abduction with dowel x 10 ea arm  Supine shoulder ER with dowel x 10 ea arm    TherEx - 30 min  Pulleys into flexion x 2 min  Pulleys into scaption x 2 min   Pulleys into abduction x 2 min  Supine shoulder flexion with dowel x 10  Supine shoulder abduction with dowel x 10 ea arm  Supine shoulder ER with dowel x 10 ea arm  Supine posterior capsule stretch 3 x 30 sec holds ea arm   Answered questions and provided education on shoulder anatomy  TherEx - 30 min  Supine shoulder flexion with dowel x 20   Supine shoulder abduction with dowel x 10 ea arm  Supine shoulder ER with dowel x 10 ea arm  Pulleys into flexion x 2 min  Pulleys into scaption x 2 min   Pulleys into abduction x 2 min   TherEx - 25 min  Pulleys into flexion x 3 min  Pulley into  scaption x 3 min  Pulleys into abduction x 3 min   Posterior capsule stretch 3 x 30 sec holds ea arm  Supine shoulder flexion x 10       TherEx - 30 min  Arm bike level 1, 2 min forward, 2 min backward  Pulleys into flexion x 3 min  Pulleys into abduction x 3 min  Supine shoulder flexion 2 x 10  Banded rows GTB 2 x 10  Scapular retractions GTB 2 x 10   Banded IR GTB x 10  Banded ER GTB x 10     TherEx - 30 min   Arm bike level 1.5, 2 min forward, 2 min backwards (50 rpm)  Banded rows GTB 2 x 10  Scapular retractions GTB 2 x 10   Banded IR GTB x 10  Banded ER GTB x 10   TRX rows x 10  TRX high rows x 10  Wall push up plus x 12    Manual Therapy 15 min  Posterior mobs grade II-IV to bilateral shoulder  Inferior mob grade II (grade III-IV discontinued due to pain)  PROM bilateral IR  and ER  Manual Therapy 15 min  Posterior mobs grade II-IV to bilateral shoulder  PROM bilateral IR  and ER   PROM scaption plane bilateral shoulders Manual Therapy 15 min  Posterior mobs grade II-IV to bilateral shoulder  PROM bilateral IR  and ER   PROM scaption plane bilateral shoulders Manual Therapy 20 min  Posterior mobs grade II-IV to bilateral shoulder  PROM bilateral IR  and ER   PROM flexion plane bilateral shoulders  PROM abduction plane bilateral shoulders Manual Therapy 15 min  Posterior mobs grade II-IV to bilateral shoulder  PROM flexion plane bilateral shoulders  PROM abduction plane bilateral shoulders Manual therapy 15 min  Posterior mobs grade III-IV to bilateral shoulder   Inferior mobs grade II-III to bilateral shoulder   PROM abduction plane bilateral shoulders                     HEP: Access Code: [email or web address]  URL: [email or web address]  Date: 07/11/2024  Prepared by: Yaakov Betancourt    Program Notes  Felicita Jara    Exercises  - Seated Scapular Retraction  - 5-6 x daily - 7 x weekly - 2 sets - 10 reps - 3 hold  - Shoulder Flexion Wall Slide with Towel  - 3 x daily - 7 x weekly - 2 sets - 10 reps -  10 hold  - Seated Shoulder Abduction Towel Slide at Table Top  - 3 x daily - 7 x weekly - 2 sets - 10 reps - 5 hold  - Supine Shoulder Flexion Extension AAROM with Dowel  - 3 x daily - 7 x weekly - 2 sets - 10 reps - 3-5 hold  - Supine Shoulder Abduction AAROM with Dowel  - 3 x daily - 7 x weekly - 2 sets - 10 reps - 3-5 hold  - Supine Shoulder External Rotation with Dowel  - 3 x daily - 7 x weekly - 2 sets - 10 reps - 3-5 hold  - Supine Shoulder Flexion Extension AAROM with Dowel  - 3 x daily - 7 x weekly - 2 sets - 10 reps  - Supine Shoulder Abduction AAROM with Dowel  - 3 x daily - 7 x weekly - 2 sets - 10 reps  - Supine Shoulder External Rotation in 45 Degrees Abduction AAROM with Dowel  - 3 x daily - 7 x weekly - 2 sets - 10 reps  - Standing Shoulder Row with Anchored Resistance  - 3 x daily - 7 x weekly - 2 sets - 15 reps  - Scapular Retraction with Resistance Advanced  - 3 x daily - 7 x weekly - 2 sets - 15 reps  - Shoulder Internal Rotation with Resistance  - 3 x daily - 7 x weekly - 2 sets - 10 reps  - Shoulder External Rotation with Anchored Resistance  - 3 x daily - 7 x weekly - 2 sets - 10 reps  - Wall Push Up with Plus  - 3 x daily - 7 x weekly - 2 sets - 10 reps    Charges: 2 TherEx 1 MT       Total Timed Treatment: 45 min  Total Treatment Time: 45 min

## 2024-07-19 ENCOUNTER — TELEPHONE (OUTPATIENT)
Dept: FAMILY MEDICINE CLINIC | Facility: CLINIC | Age: 54
End: 2024-07-19

## 2024-07-19 NOTE — TELEPHONE ENCOUNTER
Pt called office stating that she tested positive for covid on 07/18/2024 but has had symptoms since 07/16/2024. Pt wants to know if she can get medication sent to her since she is out of state in [place]. Pt wants it sent to [facility] pharmacy in [place].

## 2024-07-19 NOTE — TELEPHONE ENCOUNTER
Patient wants the medication sent to the [facility] pharmacy on [address] in [place], [place]

## 2024-07-19 NOTE — TELEPHONE ENCOUNTER
Spoke to pt, informed unable to prescribe medications without a visit and since pt is out of [place] e-visits are not an option.     Pt may go to local walk in clinic for treatment.   Pt states she is feeling slightly better than yesterday and her symptoms started Tuesday. She is day 4 of Covid.     Pt has been trying tylenol for symptoms, had a tylenol cold at home she can take. Advised patient ok to take tylenol cough and cold, should avoid taking additional tylenol in addition to tylenol cough and cold. May take advil as need, vitamin C and zinc, and try warm teas with honey. Pt should increase fluid intake as well,.    Patient voiced understanding.

## 2024-07-24 ENCOUNTER — APPOINTMENT (OUTPATIENT)
Dept: PHYSICAL THERAPY | Age: 54
End: 2024-07-24
Attending: FAMILY MEDICINE
Payer: COMMERCIAL

## 2024-07-29 DIAGNOSIS — F32.A ANXIETY AND DEPRESSION: ICD-10-CM

## 2024-07-29 DIAGNOSIS — F51.04 PSYCHOPHYSIOLOGICAL INSOMNIA: ICD-10-CM

## 2024-07-29 DIAGNOSIS — F41.9 ANXIETY AND DEPRESSION: ICD-10-CM

## 2024-07-29 RX ORDER — ESCITALOPRAM OXALATE 20 MG/1
20 TABLET ORAL DAILY
Qty: 90 TABLET | Refills: 3 | OUTPATIENT
Start: 2024-07-29

## 2024-07-29 NOTE — TELEPHONE ENCOUNTER
Refill requested too soon:     Pt requesting refill of   Requested Prescriptions     Refused Prescriptions Disp Refills    ESCITALOPRAM 20 MG Oral Tab [Pharmacy Med Name: ESCITALOPRAM 20MG TABLETS] 90 tablet 3     Sig: TAKE 1 TABLET(20 MG) BY MOUTH DAILY       Last Time Medication was Filled:  2/15/2024     Remaining refills: 90 days 3 refills    Refused medication since patient has requested refill too soon.

## 2024-08-19 RX ORDER — ROSUVASTATIN CALCIUM 20 MG/1
20 TABLET, COATED ORAL NIGHTLY
Qty: 30 TABLET | Refills: 0 | Status: SHIPPED | OUTPATIENT
Start: 2024-08-19

## 2024-08-19 NOTE — TELEPHONE ENCOUNTER
Medication(s) to Refill:   Requested Prescriptions     Pending Prescriptions Disp Refills    rosuvastatin 20 MG Oral Tab 90 tablet 0     Sig: Take 1 tablet (20 mg total) by mouth nightly.       Reason for Medication Refill being sent to Provider / Reason Protocol Failed:  [] 90 day refill has already been granted  [] Blood Pressure out of range  [x] Labs Abnormal/over due  [] Medication not previously prescribed by Provider  [] Non-Protocol Medication  [] Controlled Substance   [] Due for appointment- no future appointment scheduled  [x]   Cholesterol Medication Protocol [id number]/2024 11:17 PM   Protocol Details ALT < 80    ALT resulted within past year    Lipid panel within past 12 months    In person appointment or virtual visit in the past 12 mos or appointment in next 3 mos     Last Time Medication was Filled:  6/25/2024 90 days 0 refills    Last Office Visit with PCP: 5/09/2024  Mixed hyperlipidemia  Restart Rosuvastatin  encourage mediterranean diet  Exercise brisk walk 30-60 mins at least 5 days weekly  Lose weight if overweight  Recheck fasting labs in 3-4 weeks    When Patient was Due Back to the Office:  Return in about 9 weeks (around 7/11/2024) for After completes neuropsych testing to address memory issues..   (from when PCP last addressed condition)    Future Appointments:  No future appointments.      Last Blood Pressures:  BP Readings from Last 2 Encounters:   05/09/24 116/60   02/15/24 116/70       Recent Labs:  Lab Results   Component Value Date    CHOLEST 144 12/03/2021    TRIG 57 12/03/2021    HDL 48 12/03/2021    LDL 84 12/03/2021    VLDL 9 12/03/2021    NONHDLC 96 12/03/2021     Action taken:  [x] Refill protocol failed, patient is overdue for lab work. Pended a 30 day supply and My Chart message sent to patient.  [x] Routing to provider for approval

## 2024-08-22 ENCOUNTER — TELEPHONE (OUTPATIENT)
Dept: PODIATRY CLINIC | Facility: CLINIC | Age: 54
End: 2024-08-22

## 2024-08-22 ENCOUNTER — TELEPHONE (OUTPATIENT)
Dept: FAMILY MEDICINE CLINIC | Facility: CLINIC | Age: 54
End: 2024-08-22

## 2024-08-22 DIAGNOSIS — S92.352D CLOSED DISPLACED FRACTURE OF FIFTH METATARSAL BONE OF LEFT FOOT WITH ROUTINE HEALING, SUBSEQUENT ENCOUNTER: Primary | ICD-10-CM

## 2024-08-22 NOTE — TELEPHONE ENCOUNTER
Patient calling was at the ER today has a fracture on her fifth metatarsal  foot , patient is willing to come to the [place] location. No openings   Please advise

## 2024-08-22 NOTE — TELEPHONE ENCOUNTER
FINDINGS:     There is an obliquely oriented fracture of the distal shaft of the fifth metatarsal with slight displacement. There are no other fractures seen in the left foot. There are no dislocations.     IMPRESSION:     Fifth metatarsal fracture as described above       FINAL REPORT   Attending Radiologist:  Td Quiroz MD   Date Signed Off:  08/22/2024 11:00     [facility]  Podiatry   Phone: [phone]    Spoke with podiatry group and Dr. Marshall has the soonest availability but not until September.  Was advised that patient needs to call to schedule and inform she has a broken foot.  Message will be sent to all providers and providers will determine who will see patient and when.  Then office will call patient back.     Will await to place referral until patient is aware of who she will be seeing. Referral pending.     Left message on voicemail to call office back.     Patient notified of above and given referral information.  She will call office back with provider she will be seeing.

## 2024-08-22 NOTE — TELEPHONE ENCOUNTER
Patient calling she is a [facility] she broke a bone in her foot and needs a referral asap to someone in her network.

## 2024-08-23 ENCOUNTER — TELEPHONE (OUTPATIENT)
Dept: ORTHOPEDICS CLINIC | Facility: CLINIC | Age: 54
End: 2024-08-23

## 2024-08-23 NOTE — TELEPHONE ENCOUNTER
Spoke with patient. She did mention she already had an appointment within the system that she went to the ER, but it was out of network. I gave her number for [facility] and [facility] as we have no openings at this time in the time frame she needs to be seen.

## 2024-08-23 NOTE — TELEPHONE ENCOUNTER
Patient called for left foot 5th toe fx. Please advise for xrays   Future Appointments   Date Time Provider Department Center   8/28/2024 10:30 AM Deana Tay DPM [facility]

## 2024-08-26 NOTE — TELEPHONE ENCOUNTER
Pt notified of provider recommendations and advice for pain control. Pt voiced understanding, declines appointment at this time will try recommendations first.     Closing encounter

## 2024-08-26 NOTE — TELEPHONE ENCOUNTER
Pt called office stating that she will be seeing Dr. Bello on 08/28 at 10:30 am.    Pt also called office asking if she can get pain medication since she only has 1 pill left from what the ER gave her.     Medication and dose:  Hydrocodone 325 mgs    30 or 90 day supply: until she gets seen in ortho    Pharmacy: [facility] in [place]    Additional notes:      Your medication request will be sent to our clinical team.  If they have any questions they will call you. Patient expressed understanding.

## 2024-08-26 NOTE — TELEPHONE ENCOUNTER
Narcotics cannot be prescribed without an appointment.    Patient may take over-the-counter ibuprofen 200 mg 2 to 4 tablets with food and water every 6-8 hours as needed for pain.    If needs hydrocodone, then will need an office visit in the interim.  In clinical studies ibuprofen is as efficacious for treating pain as hydrocodone.  Please inform patient.    Signed podiatry referral

## 2024-08-26 NOTE — TELEPHONE ENCOUNTER
Referral pended, please advice if any pain control medication for patient is recommended. Pt has appointment with ortho scheduled for 08/28/2024

## 2024-08-28 ENCOUNTER — HOSPITAL ENCOUNTER (OUTPATIENT)
Dept: GENERAL RADIOLOGY | Age: 54
Discharge: HOME | End: 2024-08-28
Attending: PODIATRIST
Payer: COMMERCIAL

## 2024-08-28 ENCOUNTER — OFFICE VISIT (OUTPATIENT)
Dept: ORTHOPEDICS CLINIC | Facility: CLINIC | Age: 54
End: 2024-08-28
Payer: COMMERCIAL

## 2024-08-28 DIAGNOSIS — S92.352A CLOSED DISPLACED FRACTURE OF FIFTH METATARSAL BONE OF LEFT FOOT, INITIAL ENCOUNTER: Primary | ICD-10-CM

## 2024-08-28 DIAGNOSIS — M79.672 LEFT FOOT PAIN: ICD-10-CM

## 2024-08-28 DIAGNOSIS — M85.872 OSTEOPENIA OF LEFT FOOT: ICD-10-CM

## 2024-08-28 PROCEDURE — 73630 X-RAY EXAM OF FOOT: CPT | Performed by: PODIATRIST

## 2024-08-28 PROCEDURE — 99204 OFFICE O/P NEW MOD 45 MIN: CPT | Performed by: PODIATRIST

## 2024-08-28 RX ORDER — HYDROCODONE BITARTRATE AND ACETAMINOPHEN 5; 325 MG/1; MG/1
1 TABLET ORAL EVERY 6 HOURS PRN
Qty: 10 TABLET | Refills: 0 | Status: SHIPPED | OUTPATIENT
Start: 2024-08-28

## 2024-09-25 DIAGNOSIS — F32.A ANXIETY AND DEPRESSION: ICD-10-CM

## 2024-09-25 DIAGNOSIS — F41.9 ANXIETY AND DEPRESSION: ICD-10-CM

## 2024-09-25 DIAGNOSIS — F51.04 PSYCHOPHYSIOLOGICAL INSOMNIA: ICD-10-CM

## 2024-09-25 RX ORDER — BUPROPION HYDROCHLORIDE 300 MG/1
300 TABLET ORAL DAILY
Qty: 30 TABLET | Refills: 0 | Status: SHIPPED | OUTPATIENT
Start: 2024-09-25

## 2024-09-25 RX ORDER — ESCITALOPRAM OXALATE 20 MG/1
20 TABLET ORAL DAILY
Qty: 30 TABLET | Refills: 0 | Status: SHIPPED | OUTPATIENT
Start: 2024-09-25

## 2024-09-25 NOTE — TELEPHONE ENCOUNTER
Patient made an appointment in October she is out of medication and says she really needs them Can she get enough until her visit

## 2024-09-25 NOTE — TELEPHONE ENCOUNTER
Pt requesting refill of   Requested Prescriptions             Pending Prescriptions Disp Refills    escitalopram 20 MG Oral Tab 90 tablet 0       Sig: Take 1 tablet (20 mg total) by mouth daily.    buPROPion  MG Oral Tablet 24 Hr 90 tablet 0       Sig: Take 1 tablet (300 mg total) by mouth daily.      Last Time Medication was Filled:  2/15/2024     Last Office Visit with Provider: 5/09/2024   Anxiety and depression  Controlled  Restart escitalopram and bupropion     Patient scheduled follow up appointment on 10/21/2024. 30 day supply pended for review

## 2024-09-25 NOTE — TELEPHONE ENCOUNTER
Pt requesting refill of   Requested Prescriptions     Pending Prescriptions Disp Refills    escitalopram 20 MG Oral Tab 90 tablet 0     Sig: Take 1 tablet (20 mg total) by mouth daily.    buPROPion  MG Oral Tablet 24 Hr 90 tablet 0     Sig: Take 1 tablet (300 mg total) by mouth daily.      Last Time Medication was Filled:  2/15/2024    Last Office Visit with Provider: 5/09/2024   Anxiety and depression  Controlled  Restart escitalopram and bupropion    No future appointments.   Return in about 9 weeks (around 7/11/2024) for After completes neuropsych testing to address memory issues..

## 2024-10-02 DIAGNOSIS — G25.81 RESTLESS LEG SYNDROME: ICD-10-CM

## 2024-10-02 DIAGNOSIS — F51.04 PSYCHOPHYSIOLOGICAL INSOMNIA: ICD-10-CM

## 2024-10-02 RX ORDER — ROPINIROLE 1 MG/1
1 TABLET, FILM COATED ORAL NIGHTLY
Qty: 90 TABLET | Refills: 0 | Status: SHIPPED | OUTPATIENT
Start: 2024-10-02

## 2024-10-02 NOTE — TELEPHONE ENCOUNTER
Pt requesting refill of   Requested Prescriptions     Pending Prescriptions Disp Refills    rOPINIRole 1 MG Oral Tab 90 tablet 0     Sig: Take 1 tablet (1 mg total) by mouth nightly.      Last Time Medication was Filled: 5/22/2024    Last Office Visit with Provider: 5/09/2024  Restless leg syndrome   Restart ropirinole    Appt. scheduled on 10/21/2024

## 2024-10-04 ENCOUNTER — OFFICE VISIT (OUTPATIENT)
Dept: FAMILY MEDICINE CLINIC | Facility: CLINIC | Age: 54
End: 2024-10-04
Payer: COMMERCIAL

## 2024-10-04 VITALS
TEMPERATURE: 97 F | RESPIRATION RATE: 21 BRPM | OXYGEN SATURATION: 98 % | HEART RATE: 97 BPM | SYSTOLIC BLOOD PRESSURE: 116 MMHG | DIASTOLIC BLOOD PRESSURE: 70 MMHG

## 2024-10-04 DIAGNOSIS — R31.9 HEMATURIA, UNSPECIFIED TYPE: ICD-10-CM

## 2024-10-04 DIAGNOSIS — N93.9 VAGINAL BLEEDING: Primary | ICD-10-CM

## 2024-10-04 DIAGNOSIS — Z23 NEED FOR VACCINATION: ICD-10-CM

## 2024-10-04 PROCEDURE — 99214 OFFICE O/P EST MOD 30 MIN: CPT | Performed by: FAMILY MEDICINE

## 2024-10-04 PROCEDURE — 81015 MICROSCOPIC EXAM OF URINE: CPT | Performed by: FAMILY MEDICINE

## 2024-10-04 PROCEDURE — 90656 IIV3 VACC NO PRSV 0.5 ML IM: CPT | Performed by: FAMILY MEDICINE

## 2024-10-04 PROCEDURE — 90471 IMMUNIZATION ADMIN: CPT | Performed by: FAMILY MEDICINE

## 2024-10-04 NOTE — PROGRESS NOTES
HPI:     Felicita Jara is a 53 year old female presents for    Blood in urine.  She normally sees Dr. Galindo.  Has been occurring on and off for 2 weeks.  No dysuria, hesitancy, urgency.  Maybe mild frequency.  No n/v.  No f/c.  No back pain.  Worse in the morning and then gets better in afternoon.  Will notice symptoms for 1 day then can go 2-3 days with zero symptoms.  Will start with wiping with urination and notice blood on toilet paper.  Then looks in toilet bowel and entire bowl is red.  No tissue or clots noted.  LMP 2016.  Denies abd cramping.  Smoked for 30 yrs, quit 5 yrs ago.  Only difference in her lifestyle is she has metatarsal fracture and is in boot using scooter.        Medications (Active prior to today's visit):  Current Outpatient Medications   Medication Sig Dispense Refill    atorvastatin 40 MG Oral Tab Take 1 tablet (40 mg total) by mouth nightly.      methocarbamol 500 MG Oral Tab Take 1 tablet (500 mg total) by mouth.      nabumetone 750 MG Oral Tab Take 1 tablet (750 mg total) by mouth 2 (two) times daily with meals.      rOPINIRole 1 MG Oral Tab Take 1 tablet (1 mg total) by mouth nightly. 90 tablet 0    escitalopram 20 MG Oral Tab Take 1 tablet (20 mg total) by mouth daily. 30 tablet 0    buPROPion  MG Oral Tablet 24 Hr Take 1 tablet (300 mg total) by mouth daily. 30 tablet 0    Cholecalciferol (VITAMIN D) 50 MCG (2000 UT) Oral Cap 200 mg daily.      Calcium Carbonate-Vit D-Min (CALCIUM-VITAMIN D-MINERALS) 600-800 MG-UNIT Oral Chew Tab Chew 1 tablet by mouth in the morning and 1 tablet before bedtime. 180 tablet 3    valACYclovir 1 G Oral Tab Take 2 tablets (2,000 mg total) by mouth every 12 (twelve) hours. 4 tablet 3    topiramate 100 MG Oral Tab Take 1 tablet (100 mg total) by mouth daily. 180 tablet 1       Allergies:  Allergies   Allergen Reactions    Niacin FACE FLUSHING    Gluten Meal DIARRHEA     Gluten sensitivity        PSFH elements reviewed from today and  agreed except as otherwise stated in HPI.  ROS:      Pertinent positives and negatives noted in the the HPI.    PHYSICAL EXAM:   There were no vitals filed for this visit.  Vital signs reviewed.Appears stated age, well groomed.  Physical Exam  Constitutional:       Appearance: Normal appearance.   Cardiovascular:      Rate and Rhythm: Normal rate and regular rhythm.      Pulses: Normal pulses.      Heart sounds: No murmur heard.     No friction rub. No gallop.   Pulmonary:      Effort: Pulmonary effort is normal. No respiratory distress.      Breath sounds: No wheezing, rhonchi or rales.   Abdominal:      General: Bowel sounds are normal. There is no distension.      Palpations: Abdomen is soft.      Tenderness: There is no abdominal tenderness. There is no right CVA tenderness or left CVA tenderness.   Musculoskeletal:         General: No tenderness.      Cervical back: Neck supple.      Right lower leg: No edema.      Left lower leg: No edema.   Skin:     General: Skin is warm.   Neurological:      General: No focal deficit present.      Mental Status: She is alert.   Psychiatric:         Mood and Affect: Mood normal.         Speech: Speech normal.         Behavior: Behavior normal.       ASSESSMENT/PLAN:   53 year old female with    1. Vaginal bleeding      I have spent 30 minutes in the care of this patient including review of their past medical records and diagnostic tests, updating their chart, seeing the patient, discussing current treatment plans and documenting the encounter.    Vaginal bleeding w/ uncertain prognosis.  U/a today was completely unremarkable including no blood.  Will send for microanalysis to eval for RBC.  Did discuss it is unclear if this bleeding is coming from urinary tract vs gyne tract.  Recommend pelvic US as next step as well to eval female tract as this could be post menopausal bleeding.  Today pt is asymptomatic   Further recommendations after pelvic us and urinalysis micro  returns  If symptoms worsen acutely, recommend eval in ER  F/u shot today  F/u with PCP as previously recommended by PCP     Patient/Caregiver Education: There are no barriers to learning. Medical education done.   Outcome: Patient verbalizes understanding and agrees with plan. Advised to call or RTC if symptoms persist or worsen.    10/4/2024  Ivette Han, DO    Patient understands plan and follow-up.

## 2024-10-04 NOTE — PATIENT INSTRUCTIONS
Flu vaccine today     Pelvic US       Urine testing is normal today, will send microanalysis     F/u with PCP as previously recommended by PCP

## 2024-10-07 DIAGNOSIS — R82.90 ABNORMAL URINALYSIS: Primary | ICD-10-CM

## 2024-10-08 ENCOUNTER — TELEPHONE (OUTPATIENT)
Dept: ORTHOPEDICS CLINIC | Facility: CLINIC | Age: 54
End: 2024-10-08

## 2024-10-08 DIAGNOSIS — M79.672 LEFT FOOT PAIN: Primary | ICD-10-CM

## 2024-10-08 NOTE — TELEPHONE ENCOUNTER
XR ordered and scheduled per Ortho protocol.   Sent patient message via [employer] to inform them and ask them to arrive 15-20 minutes prior to appointment with

## 2024-10-09 ENCOUNTER — OFFICE VISIT (OUTPATIENT)
Dept: ORTHOPEDICS CLINIC | Facility: CLINIC | Age: 54
End: 2024-10-09
Payer: COMMERCIAL

## 2024-10-09 ENCOUNTER — HOSPITAL ENCOUNTER (OUTPATIENT)
Dept: GENERAL RADIOLOGY | Age: 54
Discharge: HOME | End: 2024-10-09
Attending: PODIATRIST
Payer: COMMERCIAL

## 2024-10-09 VITALS — WEIGHT: 157.63 LBS | BODY MASS INDEX: 24.74 KG/M2 | HEIGHT: 67 IN

## 2024-10-09 DIAGNOSIS — S92.352D CLOSED DISPLACED FRACTURE OF FIFTH METATARSAL BONE OF LEFT FOOT WITH ROUTINE HEALING, SUBSEQUENT ENCOUNTER: Primary | ICD-10-CM

## 2024-10-09 DIAGNOSIS — M79.672 LEFT FOOT PAIN: ICD-10-CM

## 2024-10-09 DIAGNOSIS — M85.872 OSTEOPENIA OF LEFT FOOT: ICD-10-CM

## 2024-10-09 DIAGNOSIS — E55.9 VITAMIN D DEFICIENCY: ICD-10-CM

## 2024-10-09 PROCEDURE — 99213 OFFICE O/P EST LOW 20 MIN: CPT | Performed by: PODIATRIST

## 2024-10-09 PROCEDURE — 73630 X-RAY EXAM OF FOOT: CPT | Performed by: PODIATRIST

## 2024-10-09 NOTE — PROGRESS NOTES
[facility] Progress Note    Subjective:     Felicita is here for follow-up of her fifth metatarsal fracture.  She has been pretty nonweightbearing and it is now about 7 weeks post injury        Objective:     Exam minimal pain along the fifth ray left foot no swelling.  Mild atrophy left lower extremity          Imaging: X-rays show the fracture to be stable and still healing.  Mild sagittal elevation on lateral view        Assessment/Plan:     Diagnoses and all orders for this visit:    Closed displaced fracture of fifth metatarsal bone of left foot with routine healing, subsequent encounter  -     Physical Therapy Referral - [facility]    Osteopenia of left foot  -     Physical Therapy Referral - [facility]    Vitamin D deficiency        She is doing well and we talked about the fact that she has some osteopenia and history of smoking.  There is a little delayed of this type of fracture anyway and I think it will just take a little longer but long-term I think she will be just fine.  Long-term we will need to look for pressure under the fourth metatarsal head.  Recommend 3 more weeks in the boot but weightbearing fully is recommended    Into a solid tennis shoe in 3 weeks and start physical therapy in 3 weeks    Follow-up with me for final x-ray in 6 weeks            Deana Tay, DPodiatric Surgery  [facility]  [address], [address] [place] [id number]   [address] [place] [address]   [address] [place] [address]  [email or web address]  [email or web address]  t: [phone]   f: [phone]            Dragon speech recognition software was used to prepare this note. If a word or phrase is confusing, it is likely do to a failure of recognition. Please contact me with any questions or clarifications.

## 2024-10-21 ENCOUNTER — HOSPITAL ENCOUNTER (OUTPATIENT)
Dept: ULTRASOUND IMAGING | Age: 54
Discharge: HOME | End: 2024-10-21
Attending: FAMILY MEDICINE
Payer: COMMERCIAL

## 2024-10-21 DIAGNOSIS — N93.9 VAGINAL BLEEDING: ICD-10-CM

## 2024-10-21 PROCEDURE — 76830 TRANSVAGINAL US NON-OB: CPT | Performed by: FAMILY MEDICINE

## 2024-10-21 PROCEDURE — 76856 US EXAM PELVIC COMPLETE: CPT | Performed by: FAMILY MEDICINE

## 2024-10-23 DIAGNOSIS — N93.9 VAGINAL BLEEDING: Primary | ICD-10-CM

## 2024-10-23 DIAGNOSIS — D21.9 FIBROID: ICD-10-CM

## 2024-10-23 DIAGNOSIS — N83.202 BILATERAL OVARIAN CYSTS: ICD-10-CM

## 2024-10-23 DIAGNOSIS — N83.201 BILATERAL OVARIAN CYSTS: ICD-10-CM

## 2024-10-26 PROBLEM — L02.92 RECURRENT BOILS: Status: RESOLVED | Noted: 2018-02-14 | Resolved: 2024-10-26

## 2024-10-26 PROBLEM — D25.1 INTRAMURAL LEIOMYOMA OF UTERUS: Status: ACTIVE | Noted: 2024-10-26

## 2024-10-26 PROBLEM — R31.0 GROSS HEMATURIA: Status: ACTIVE | Noted: 2024-10-26

## 2024-10-26 PROBLEM — F99 INSOMNIA DUE TO OTHER MENTAL DISORDER: Status: RESOLVED | Noted: 2020-09-22 | Resolved: 2024-10-26

## 2024-10-26 PROBLEM — F51.04 PSYCHOPHYSIOLOGICAL INSOMNIA: Status: RESOLVED | Noted: 2020-10-02 | Resolved: 2024-10-26

## 2024-10-26 PROBLEM — D17.1 LIPOMA OF TORSO: Status: RESOLVED | Noted: 2020-10-02 | Resolved: 2024-10-26

## 2024-10-26 PROBLEM — F51.05 INSOMNIA DUE TO OTHER MENTAL DISORDER: Status: RESOLVED | Noted: 2020-09-22 | Resolved: 2024-10-26

## 2024-10-26 PROBLEM — G43.109 MIGRAINE WITH AURA AND WITHOUT STATUS MIGRAINOSUS, NOT INTRACTABLE: Status: ACTIVE | Noted: 2024-10-26

## 2024-10-26 PROBLEM — M54.32 LEFT SIDED SCIATICA: Status: RESOLVED | Noted: 2024-01-23 | Resolved: 2024-10-26

## 2024-11-18 ENCOUNTER — HOSPITAL ENCOUNTER (OUTPATIENT)
Dept: ULTRASOUND IMAGING | Facility: HOSPITAL | Age: 54
Discharge: HOME | End: 2024-11-18
Attending: FAMILY MEDICINE
Payer: COMMERCIAL

## 2024-11-18 DIAGNOSIS — E04.1 THYROID NODULE: ICD-10-CM

## 2024-11-18 PROCEDURE — 76536 US EXAM OF HEAD AND NECK: CPT | Performed by: FAMILY MEDICINE

## 2024-11-19 ENCOUNTER — TELEPHONE (OUTPATIENT)
Dept: ORTHOPEDICS CLINIC | Facility: CLINIC | Age: 54
End: 2024-11-19

## 2024-11-19 DIAGNOSIS — M79.672 LEFT FOOT PAIN: Primary | ICD-10-CM

## 2024-11-19 NOTE — TELEPHONE ENCOUNTER
XR ordered and scheduled per Ortho protocol.   Sent patient message via [employer] to inform them and ask them to arrive 15-20 minutes prior to appointment with

## 2024-11-20 ENCOUNTER — TELEPHONE (OUTPATIENT)
Dept: ORTHOPEDICS CLINIC | Facility: CLINIC | Age: 54
End: 2024-11-20

## 2024-11-20 ENCOUNTER — TELEPHONE (OUTPATIENT)
Dept: FAMILY MEDICINE CLINIC | Facility: CLINIC | Age: 54
End: 2024-11-20

## 2024-11-20 NOTE — TELEPHONE ENCOUNTER
Patient called stating she got stuck at work and won't make it in time to her appt. Please advise of next appropriate appt day/time. Patient states she can't do Wednesday mornings going forward do to work conflict.

## 2024-11-20 NOTE — TELEPHONE ENCOUNTER
Patient calling she needs a referral to get a cystoscopy done she was told to call her primary for the referral from the urologist. Also this is the code or number that she needed 17116 is what they told her?    Patient also needs a referral for a Ct scan of bladder    Also patient needs a referral to see a Dr Felicita Trimble with [facility].  Patient was confused on what referrals sees needed.

## 2024-11-27 ENCOUNTER — HOSPITAL ENCOUNTER (OUTPATIENT)
Dept: GENERAL RADIOLOGY | Age: 54
Discharge: HOME | End: 2024-11-27
Attending: PODIATRIST
Payer: COMMERCIAL

## 2024-11-27 ENCOUNTER — OFFICE VISIT (OUTPATIENT)
Dept: ORTHOPEDICS CLINIC | Facility: CLINIC | Age: 54
End: 2024-11-27
Payer: COMMERCIAL

## 2024-11-27 DIAGNOSIS — B35.1 ONYCHOMYCOSIS: ICD-10-CM

## 2024-11-27 DIAGNOSIS — M85.872 OSTEOPENIA OF LEFT FOOT: ICD-10-CM

## 2024-11-27 DIAGNOSIS — E55.9 VITAMIN D DEFICIENCY: ICD-10-CM

## 2024-11-27 DIAGNOSIS — S92.352D CLOSED DISPLACED FRACTURE OF FIFTH METATARSAL BONE OF LEFT FOOT WITH ROUTINE HEALING, SUBSEQUENT ENCOUNTER: ICD-10-CM

## 2024-11-27 DIAGNOSIS — S92.352D CLOSED DISPLACED FRACTURE OF FIFTH METATARSAL BONE OF LEFT FOOT WITH ROUTINE HEALING, SUBSEQUENT ENCOUNTER: Primary | ICD-10-CM

## 2024-11-27 DIAGNOSIS — M79.672 LEFT FOOT PAIN: ICD-10-CM

## 2024-11-27 PROCEDURE — 73630 X-RAY EXAM OF FOOT: CPT | Performed by: PODIATRIST

## 2024-11-27 PROCEDURE — 73610 X-RAY EXAM OF ANKLE: CPT | Performed by: PODIATRIST

## 2024-11-27 PROCEDURE — 99214 OFFICE O/P EST MOD 30 MIN: CPT | Performed by: PODIATRIST

## 2024-12-02 NOTE — PROGRESS NOTES
[facility] Progress Note    Subjective:     Pt is here for follow up   Now about 3 months post injury, 5th metatarsal fracture  She is doing well  Still in boot  She has some questions about thickening of her nails          Objective:   Exam left foot  Some tender under 4th mpj  No ankle pain.  Atrophy of left calf      Nails - mild thickening,discoloration     Imaging: ankle xrays-ankle mortise clear    Left foot xrays  Healing well - elevated on sagittal view          Assessment/Plan:     Diagnoses and all orders for this visit:    Closed displaced fracture of fifth metatarsal bone of left foot with routine healing, subsequent encounter  -     XR FOOT, COMPLETE (MIN 3 VIEWS), LEFT (CPT=73630); Future  -     Physical Therapy Referral - [facility]    Osteopenia of left foot    Vitamin D deficiency    Onychomycosis        Discussed transfer pain - under 4th mpj area.  The fracture has healed elevated in sagital plane - she may have pain under mpj 4th      Good shoes is key  Arch supports  Met pads      Final xray in 6 weeks    Start PT  Ankle and foot rehab  Weak LE  LE eval and treat  Modalities,   Gait eval     We discussed onychomycosis.  What it is and how it occurs  Naftin nail gel for nail fungus  Samples given  Use bid on nails and under nail bed  She will likely be on this for at least 9-12months      Deana Tay DPM  [facility]    [employer] speech recognition software was used to prepare this note. If a word or phrase is confusing, it is likely do to a failure of recognition. Please contact me with any questions or clarifications.

## 2024-12-20 ENCOUNTER — TELEPHONE (OUTPATIENT)
Dept: PHYSICAL THERAPY | Facility: HOSPITAL | Age: 54
End: 2024-12-20

## 2025-01-02 ENCOUNTER — TELEPHONE (OUTPATIENT)
Dept: PHYSICAL THERAPY | Facility: HOSPITAL | Age: 55
End: 2025-01-02

## 2025-01-02 ENCOUNTER — OFFICE VISIT (OUTPATIENT)
Dept: PHYSICAL THERAPY | Facility: HOSPITAL | Age: 55
End: 2025-01-02
Attending: PODIATRIST
Payer: COMMERCIAL

## 2025-01-02 DIAGNOSIS — S92.352D CLOSED DISPLACED FRACTURE OF FIFTH METATARSAL BONE OF LEFT FOOT WITH ROUTINE HEALING, SUBSEQUENT ENCOUNTER: Primary | ICD-10-CM

## 2025-01-02 PROCEDURE — 97110 THERAPEUTIC EXERCISES: CPT

## 2025-01-02 PROCEDURE — 97161 PT EVAL LOW COMPLEX 20 MIN: CPT

## 2025-01-02 NOTE — PROGRESS NOTES
LOWER EXTREMITY EVALUATION:     Diagnosis:   Closed displaced fracture of fifth metatarsal bone of left foot with routine healing, subsequent encounter (S92.352D)       Referring Provider: Deana Tay  Date of Evaluation:    1/2/2025    Precautions:   osteopenia  Next MD visit:   none scheduled  Date of Surgery: n/a     PATIENT SUMMARY   Felicita Jara is a 54 year old female who presents to therapy today with complaints of left foot pain that began after rotating on left foot, and felt pop. Reports that following there was numbness through the lateral side of the foot. Reports pain.   Was in a boot for 7 weeks, and non-weight bearing. Just got out of boot after 3 months.   Has been wearing new balance tennis shoes which has been helpful.  Continues soreness and some swelling in the foot.     Pt describes pain level current 0/10, at best 0/10, at worst 1/10.   Pain location: lateral left foot.   Pain description: minor sharpness/jolt  Current functional limitations include walking, moving quickly, standing more than 5 minutes.     Felicita describes prior level of function fairly inactive in the last few months due to family concerns.   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 Lots of standing, lifting sculptures   Pt goals include full range of motion, jump, run.  Past medical history was reviewed with Felicita. Significant findings include   Past Medical History:    Abdominal distention    Alcoholism (HCC)    Allergic rhinitis    Sinus headaches    Anxiety    Arthritis    Bloating    Colon polyp    Decorative tattoo    Depression    Diarrhea, unspecified    Essential hypertension    Hemorrhoids    IBS (irritable bowel syndrome)    Irregular bowel habits    Nausea    Obesity    Im working on it    Osteoarthritis    Stress    Trigger finger    Visual impairment    glasses    Weight gain         ASSESSMENT  Felicita presents to physical therapy evaluation with primary c/o left lateral . The  results of the objective tests and measures show decreased strength, description of fear avoiding behaviors, impaired balance.  Functional deficits include but are not limited to walking, moving quickly, standing more than 5 minutes. .  Signs and symptoms are consistent with diagnosis of weakness and return to functional activities following fracture. Pt and PT discussed evaluation findings, pathology, POC and HEP.  Pt voiced understanding and performs HEP correctly without reported pain. Skilled Physical Therapy is medically necessary to address the above impairments and reach functional goals.     OBJECTIVE:   Observation: decreased stance on left leg  Palpation: slight tenderness to palpation along left lateral foot  Sensation: light touch sensation intact    AROM: (* denotes performed with pain)  Hip Knee Foot/Ankle   Flexion: R WFL; L WFL  Extension: R WFL; L WFL  Abduction: R WFL; L WFL  ER: R WFL; L WFL  IR: R WFL; L WFL Flexion: R WFL; L WFL  Extension: R WFL; L WFL    DF: R WFL; L WFL  PF: R WFL; L WFL  INV: R WFL; L WFL  EV: R WFL; L WFL  Great toe ext: R WFL; L 22       Flexibility:  Hip Flexor: R NL, L NL  Hamstrings: R NL; L NL  Piriformis: R NL; L NL  Quads: R NL; L NL  Gastroc-soleus: R NL; L NL    Strength/MMT: (* denotes performed with pain)  Hip Knee Foot/Ankle   Flexion: R 5/5; L 5/5  Extension: R 5/5; L 5/5  Abduction: R 5/5; L 5/5  ER: R 5/5; L 5/5  IR: R 5/5; L 5/5 Flexion: R 5/5; L 5/5  Extension: R 5/5; L 5/5    DF: R 5/5; L 3+/5  PF: R 5/5; L 3+/5  INV: R 5/5; L 3+/5  EV: R 5/5; L 3+/5  Great toe ext: R 5/5; L 3+/5     Gait: pt ambulates on level ground with normal mechanics and decreased stance phase on left foot  Balance: SLS: R 24 sec, L 6 sec    Today’s Treatment and Response:   Pt education was provided on exam findings, treatment diagnosis, treatment plan, expectations, and prognosis. Pt was also provided recommendations for activity modifications, possible soreness after evaluation,  modalities as needed [ice/heat], detrimental fear avoidance behaviors, importance of remaining active, and shoe wear.  Patient was instructed in and issued a HEP for: Access Code: [email or web address]  URL: [email or web address]  Date: 01/02/2025  Prepared by: Yaakov Betancourt    Exercises  - Seated Toe Towel Scrunches  - 2-3 x daily - 7 x weekly - 2 sets - 10 reps  - Seated Heel Raise  - 2-3 x daily - 7 x weekly - 3 sets - 10 reps  - Heel Raises with Counter Support  - 2-3 x daily - 7 x weekly - 2 sets - 10 reps  - Standing Tandem Balance with Counter Support  - 2- x daily - 7 x weekly - 1 sets - 4 reps - 20 hold    Charges: PT Eval Low Complexity, 1 therex      Total Timed Treatment: 10 min     Total Treatment Time: 45 min     Based on clinical rationale and outcome measures, this evaluation involved Low Complexity decision making due to 1-2 personal factors/comorbidities, 3 body structures involved/activity limitations, and evolving symptoms including changing pain levels.  PLAN OF CARE:    Goals: (to be met in 8 visits)  Pt will demonstrate improved DF AROM to >= 10 degrees to promote proper foot clearance during gait and greater ease descending stairs without compensation  Pt will have increased ankle strength to 5/5 throughout for improved ankle control with ADLs such as prolonged gait and stair negotiation (  Pt will have improved SLS to >30s for increased ankle stability with ambulation on uneven surfaces such as gravel and grass   Pt will report <1/10 pain with work and home activities such as standing for school classes   Pt will be independent and compliant with comprehensive HEP to maintain progress achieved in PT      Frequency / Duration: Patient will be seen for 1-2 x/week or a total of 8 visits over a 90 day period. Treatment will include: Gait training, Manual Therapy, Neuromuscular Re-education, Therapeutic Activities, Therapeutic Exercise, and Home Exercise Program instruction    Education  or treatment limitation: None  Rehab Potential:good    LEFS Score  LEFS Score: (Patient-Rptd) 67.5 % (1/1/2025  5:44 PM)      Patient/Family/Caregiver was advised of these findings, precautions, and treatment options and has agreed to actively participate in planning and for this course of care.    Thank you for your referral. Please co-sign or sign and return this letter via fax as soon as possible to [phone]. If you have any questions, please contact me at Dept: [phone]    Sincerely,  Electronically signed by therapist: Yaakov Betancourt, PT  Physician's certification required: Yes  I certify the need for these services furnished under this plan of treatment and while under my care.    X___________________________________________________ Date____________________    Certification From: 1/2/2025  To:4/2/2025

## 2025-01-07 ENCOUNTER — OFFICE VISIT (OUTPATIENT)
Dept: PHYSICAL THERAPY | Facility: HOSPITAL | Age: 55
End: 2025-01-07
Attending: PODIATRIST
Payer: COMMERCIAL

## 2025-01-07 ENCOUNTER — TELEPHONE (OUTPATIENT)
Dept: ORTHOPEDICS CLINIC | Facility: CLINIC | Age: 55
End: 2025-01-07

## 2025-01-07 DIAGNOSIS — F32.A ANXIETY AND DEPRESSION: ICD-10-CM

## 2025-01-07 DIAGNOSIS — M79.672 LEFT FOOT PAIN: Primary | ICD-10-CM

## 2025-01-07 DIAGNOSIS — F41.9 ANXIETY AND DEPRESSION: ICD-10-CM

## 2025-01-07 PROCEDURE — 97140 MANUAL THERAPY 1/> REGIONS: CPT

## 2025-01-07 PROCEDURE — 97110 THERAPEUTIC EXERCISES: CPT

## 2025-01-07 NOTE — TELEPHONE ENCOUNTER
BONIFACIO ordered and scheduled per Ortho protocol.   Sent patient message via [employer] to inform them and ask them to arrive 15-20 minutes prior to appointment with

## 2025-01-07 NOTE — PROGRESS NOTES
Diagnosis:   Closed displaced fracture of fifth metatarsal bone of left foot with routine healing, subsequent encounter (S92.352D)       Referring Provider: Deana Tay  Date of Evaluation:    1/2/2025    Precautions:   osteopenia Next MD visit:   none scheduled  Date of Surgery: n/a   Insurance Primary/Secondary: [employer] [place] PPO / N/A     # Auth Visits: POC 8 visits           Subjective: I have been feeling pretty sore. I felt like I might have overdone it this morning. But the exercises that you gave me are going really well.    Pain: 1/10      Objective:   SL heel raise  R: 6  L: 2      Assessment: pt tolerated session well. Pt presented with mild complaints of soreness in the foot. Pt completed exercises without reports of pain. Focused on ambulating with heel-toe pattern, pt continues to demonstrate decreased stance on the left with early swing phase. Pt reported feeling tightness in the calf. Following stretching, pt demonstrated improvement of gait mechanics. Pt's HEP updated.       Goals: (to be met in 8 visits)  Pt will demonstrate improved DF AROM to >= 10 degrees to promote proper foot clearance during gait and greater ease descending stairs without compensation  Pt will have increased ankle strength to 5/5 throughout for improved ankle control with ADLs such as prolonged gait and stair negotiation (  Pt will have improved SLS to >30s for increased ankle stability with ambulation on uneven surfaces such as gravel and grass   Pt will report <1/10 pain with work and home activities such as standing for school classes   Pt will be independent and compliant with comprehensive HEP to maintain progress achieved in PT    Plan: continue with strengthening, return to functional activities  Date: 1/7/2025  TX#: 2/8 Date:                 TX#: 3/ Date:                 TX#: 4/ Date:                 TX#: 5/ Date:   Tx#: 6/   TherEx  4 way ankle banded range x 15 YTB, x 15 RTB  [place]  on L foot  Towel  scrunches on L x 10  Heel-toe walking in // bars focusing pushup off.   Bilateral gastroc stretch 2 x 30 sec hold  Standing gastroc stretch 3 x 20 sec hold  Side stepping YTB around ankle in // bars x 3 laps  Fwd/bkwd monster walks YTB around ankle in // bars x 3 laps       Manual therapy  Ankle PROM  STM to lateral left foot and ankle                     HEP: Access Code: [email or web address]  URL: [email or web address]  Date: 01/07/2025  Prepared by: Yaakov Betancourt    Exercises  - Seated Toe Towel Scrunches  - 2-3 x daily - 7 x weekly - 2 sets - 10 reps  - Seated Heel Raise  - 2-3 x daily - 7 x weekly - 3 sets - 10 reps  - Heel Raises with Counter Support  - 2-3 x daily - 7 x weekly - 2 sets - 10 reps  - Standing Tandem Balance with Counter Support  - 2- x daily - 7 x weekly - 1 sets - 4 reps - 20 hold  - Long Sitting Ankle Inversion with Resistance  - 2 x daily - 7 x weekly - 2 sets - 15 reps  - Long Sitting Ankle Eversion with Resistance  - 2 x daily - 7 x weekly - 2 sets - 15 reps  - Long Sitting Ankle Plantar Flexion with Resistance  - 2 x daily - 7 x weekly - 2 sets - 15 reps  - Long Sitting Ankle Dorsiflexion with Anchored Resistance  - 2 x daily - 7 x weekly - 2 sets - 15 reps  - Side Stepping with Resistance at Ankles and Counter Support  - 2 x daily - 7 x weekly - 3 sets  - Forward and Backward Monster Walk with Resistance at Ankles and Counter Support  - 2 x daily - 7 x weekly - 3 sets    Charges: 1 MT 2 therex       Total Timed Treatment: 45 min  Total Treatment Time: 45 min

## 2025-01-08 ENCOUNTER — HOSPITAL ENCOUNTER (OUTPATIENT)
Dept: GENERAL RADIOLOGY | Age: 55
Discharge: HOME | End: 2025-01-08
Attending: PODIATRIST
Payer: COMMERCIAL

## 2025-01-08 ENCOUNTER — OFFICE VISIT (OUTPATIENT)
Dept: ORTHOPEDICS CLINIC | Facility: CLINIC | Age: 55
End: 2025-01-08
Payer: COMMERCIAL

## 2025-01-08 VITALS — HEIGHT: 68.5 IN | WEIGHT: 160 LBS | BODY MASS INDEX: 23.97 KG/M2

## 2025-01-08 DIAGNOSIS — M79.672 LEFT FOOT PAIN: ICD-10-CM

## 2025-01-08 DIAGNOSIS — S92.352D CLOSED DISPLACED FRACTURE OF FIFTH METATARSAL BONE OF LEFT FOOT WITH ROUTINE HEALING, SUBSEQUENT ENCOUNTER: Primary | ICD-10-CM

## 2025-01-08 PROCEDURE — 73630 X-RAY EXAM OF FOOT: CPT | Performed by: PODIATRIST

## 2025-01-08 PROCEDURE — 3008F BODY MASS INDEX DOCD: CPT | Performed by: PODIATRIST

## 2025-01-08 PROCEDURE — 99213 OFFICE O/P EST LOW 20 MIN: CPT | Performed by: PODIATRIST

## 2025-01-08 NOTE — TELEPHONE ENCOUNTER
Refill(s) Requested:   Requested Prescriptions     Pending Prescriptions Disp Refills    buPROPion  MG Oral Tablet 24 Hr 90 tablet 0     Sig: Take 1 tablet (300 mg total) by mouth daily.    escitalopram 20 MG Oral Tab 90 tablet 0     Sig: Take 1 tablet (20 mg total) by mouth daily.     Medication Last Prescribed:    Bupropion 10/26/2024 90 days 1 refill  Escitalopram 10/26/2024 90 days 1 refill     Has dose or medication been changed    since last prescription? *Review notes* []  Yes  [x]  No     Last office visit: 10/26/2024 (in-office)  Visit date not found (virtual visit)     Relevant details from LOV note: Anxiety and depression  - buPROPion  MG Oral Tablet 24 Hr; Take 1 tablet (300 mg total) by mouth daily.  Dispense: 90 tablet; Refill: 1  - escitalopram 20 MG Oral Tab; Take 1 tablet (20 mg total) by mouth daily.  Dispense: 90 tablet; Refill: 1  Controlled  Restart escitalopram and bupropion  -contracts for safety- if suicidal or homicidal, call 911 or go to nearest ER and call office  -increased suicide risk on SNRI   Exercise 3 x weekly x 30-60min     Relevant lab results: N/A    Patient was asked to follow-up in:  5 months    Appointment due: March 2025    Future Appointments: Visit date not found     Action taken:  [x] Left voicemail for patient to call office back. Reason: patient has one refill left tried calling patient to inform

## 2025-01-08 NOTE — PROGRESS NOTES
[facility] Progress Note    Subjective:     Felicita is here for follow-up of her left foot.  It is now about 4 months post injury and she is doing pretty well with a little numbness and a little bit of pain around the fourth toe.  She is in physical therapy.  She did get new balance shoes that she really likes and feels are helpful        Objective:     Exam left foot mild tenderness about the fourth MP joint.  Mild tenderness at the fracture site but this is minimal no swelling.          Imaging: remodeling of fracture noted, progressing from last xray        Assessment/Plan:     Diagnoses and all orders for this visit:    Closed displaced fracture of fifth metatarsal bone of left foot with routine healing, subsequent encounter        She is doing well and the fracture is healing.  Gradual increase in activity, continue with physical therapy.  Continue her good new balance shoes.  At 6 months she can resume most activities and anything that is a little heavier work with her foot or jumping landing running activity if wanted.  Follow-up as needed        Deana Tay, DPMPodiatric Surgery  FACFAS  [facility]  [address], [address] [place] [address] [id number] [address] [place], [place] [address] [place] [email or web address]  [email or web address]  t: [phone]   f: [phone]            Dragon speech recognition software was used to prepare this note. If a word or phrase is confusing, it is likely do to a failure of recognition. Please contact me with any questions or clarifications.

## 2025-01-09 ENCOUNTER — TELEPHONE (OUTPATIENT)
Dept: PHYSICAL THERAPY | Facility: HOSPITAL | Age: 55
End: 2025-01-09

## 2025-01-09 ENCOUNTER — APPOINTMENT (OUTPATIENT)
Dept: PHYSICAL THERAPY | Facility: HOSPITAL | Age: 55
End: 2025-01-09
Attending: PODIATRIST
Payer: COMMERCIAL

## 2025-01-14 ENCOUNTER — TELEPHONE (OUTPATIENT)
Dept: PHYSICAL THERAPY | Facility: HOSPITAL | Age: 55
End: 2025-01-14

## 2025-01-14 ENCOUNTER — APPOINTMENT (OUTPATIENT)
Dept: PHYSICAL THERAPY | Facility: HOSPITAL | Age: 55
End: 2025-01-14
Attending: PODIATRIST
Payer: COMMERCIAL

## 2025-01-16 ENCOUNTER — TELEPHONE (OUTPATIENT)
Dept: FAMILY MEDICINE CLINIC | Facility: CLINIC | Age: 55
End: 2025-01-16

## 2025-01-16 ENCOUNTER — APPOINTMENT (OUTPATIENT)
Dept: PHYSICAL THERAPY | Facility: HOSPITAL | Age: 55
End: 2025-01-16
Attending: PODIATRIST
Payer: COMMERCIAL

## 2025-01-16 DIAGNOSIS — N20.0 KIDNEY STONE: Primary | ICD-10-CM

## 2025-01-16 NOTE — TELEPHONE ENCOUNTER
Left msg to call back need more info     If can provide via fax or my chart AVS/Uro notes     Name of office and Doctor to call , fax info?

## 2025-01-16 NOTE — TELEPHONE ENCOUNTER
Referral for out of state Urology       Left msg to pt to cb re other info: specialist name, other procedure needs PA (dx, cpt codes, NPI)

## 2025-01-16 NOTE — TELEPHONE ENCOUNTER
Patient calling she was just discharged from the ER in [place] for a 8mm kidney stone.  She spoke with [facility] and they told her that she needs to get a referral she needs it for [facility] [place] [address].  She is in a lot of pain please call her when complete.

## 2025-01-16 NOTE — TELEPHONE ENCOUNTER
Urology referral signed.  Please inform.  Patient should follow-up with me when she returns home.  Just MOUNARAEANN her mammogram and physical are due in February so she should make sure she schedules those appointments when she returns.    Thank you

## 2025-01-20 ENCOUNTER — PATIENT MESSAGE (OUTPATIENT)
Dept: FAMILY MEDICINE CLINIC | Facility: CLINIC | Age: 55
End: 2025-01-20

## 2025-01-21 ENCOUNTER — TELEPHONE (OUTPATIENT)
Dept: FAMILY MEDICINE CLINIC | Facility: CLINIC | Age: 55
End: 2025-01-21

## 2025-01-21 DIAGNOSIS — N20.0 KIDNEY STONE: Primary | ICD-10-CM

## 2025-01-21 NOTE — TELEPHONE ENCOUNTER
Pt called office stating that she has an 8mm kidney stone but is out of town until Thursday and would like a referral to see a urologist here in [place] due to the size of the kidney stone and the recovery town.

## 2025-01-21 NOTE — TELEPHONE ENCOUNTER
Left message for Pt to call back  Left info also for Dr. Alvarez  We have copy of CT done out of state in triage form folder, when Pt calls back with appt info we can then fax results .

## 2025-01-21 NOTE — TELEPHONE ENCOUNTER
___per Dr. Galindo____________________________________________________        Referred to Provider Information:  Provider Address Phone   Michel Alvarez  [address]  [facility] [id number] [phone]

## 2025-01-21 NOTE — TELEPHONE ENCOUNTER
Telephone encounter closed- Pt has not returned calls.  Left another message to call back if still needs a referral

## 2025-01-22 NOTE — TELEPHONE ENCOUNTER
Britney Diaz, RN   Registered Nurse     Telephone Encounter     Signed     Encounter Date: 1/21/2025     Signed         Left message for Pt to call back  Left info also for Dr. Alvarez  We have copy of CT done out of state in triage form folder, when Pt calls back with appt info we can then fax results .               Electronically signed by Britney Diaz RN at 1/21/2025  4:50 PM         Encounter Date: 1/21/2025       ___per Dr. Galindo____________________________________________________           Referred to Provider Information:  Provider Address Phone   Michel Alvarez MD Formerly named [facility] [address]  [address] [id number] [phone]          Electronically signed by Britney Diaz RN at 1/21/2025  4:48 PM         Received copy of office visit from urologist [place] of state-spoke with RN in office Pratibha to place referral for urologist and contact patient to schedule appointment.  Patient needs lithotripsy.  Referral placed for .  Patient may schedule appointment and we can fax the results.  Please contact patient to schedule appointment ASAP.

## 2025-01-24 ENCOUNTER — MED REC SCAN ONLY (OUTPATIENT)
Dept: FAMILY MEDICINE CLINIC | Facility: CLINIC | Age: 55
End: 2025-01-24

## 2025-01-24 NOTE — TELEPHONE ENCOUNTER
Pt called back and appt not till 2/20/25  Spoke with Urology and appt moved to 2/6/25 at [facility] location. Pt aware. CT results faxed to urology.  Copy sent to scan

## 2025-01-28 ENCOUNTER — OFFICE VISIT (OUTPATIENT)
Dept: PHYSICAL THERAPY | Facility: HOSPITAL | Age: 55
End: 2025-01-28
Attending: PODIATRIST
Payer: COMMERCIAL

## 2025-01-28 PROCEDURE — 97140 MANUAL THERAPY 1/> REGIONS: CPT

## 2025-01-28 PROCEDURE — 97110 THERAPEUTIC EXERCISES: CPT

## 2025-01-28 NOTE — PROGRESS NOTES
Diagnosis:   Closed displaced fracture of fifth metatarsal bone of left foot with routine healing, subsequent encounter (S92.352D)       Referring Provider: Deana Tay  Date of Evaluation:    1/2/2025    Precautions:   osteopenia Next MD visit:   none scheduled  Date of Surgery: n/a   Insurance Primary/Secondary: [employer] PPO / N/A     # Auth Visits: POC 8 visits           Subjective: Things have been not the greatest for the last couple of weeks. I have been in [place] with my mom at the hospital so I haven't been doing the exercises.    I did stub my toe on Sunday and it has been bothering me some.    Pain: 1/10      Objective:   SL heel raise  R: 6  L: 2      Assessment: pt tolerated session well. Pt presented with mild complaints of soreness in the foot. Pt reports that she stubbed her left big toe on Sunday, and that has been the pain  as of late. Continues to report that she has been feeling better but admits she hasn't been the best at completing exercises as she has been with her mom. Able to progress some foot intrinsic exercises and well tolerated.      Goals: (to be met in 8 visits)  Pt will demonstrate improved DF AROM to >= 10 degrees to promote proper foot clearance during gait and greater ease descending stairs without compensation  Pt will have increased ankle strength to 5/5 throughout for improved ankle control with ADLs such as prolonged gait and stair negotiation (  Pt will have improved SLS to >30s for increased ankle stability with ambulation on uneven surfaces such as gravel and grass   Pt will report <1/10 pain with work and home activities such as standing for school classes   Pt will be independent and compliant with comprehensive HEP to maintain progress achieved in PT    Plan: continue with strengthening, return to functional activities  Date: 1/7/2025  TX#: 2/8 Date:1/28/2025                 TX#: 3/8 Date:                 TX#: 4/ Date:                 TX#: 5/ Date:   Tx#:  6/   TherEx  4 way ankle banded range x 15 YTB, x 15 RTB  [place]  on L foot  Towel scrunches on L x 10  Heel-toe walking in // bars focusing pushup off.   Bilateral gastroc stretch 2 x 30 sec hold  Standing gastroc stretch 3 x 20 sec hold  Side stepping YTB around ankle in // bars x 3 laps  Fwd/bkwd monster walks YTB around ankle in // bars x 3 laps TherEx  4 way ankle banded range 2 x 15 RTB  Towel scrunches on L x 10  [place] yoga x 20  Side stepping in // bars RTB x 5 laps   Fwd/bkwd monster walks RTB around ankle in // bars x 3 laps  DL heel raise x 20   Bilateral gastroc stretch 2 x 30 sec hold  Standing gastroc stretch 3 x 20 sec hold  Eccentric single leg lowering from heel raise x 15        Manual therapy  Ankle PROM  STM to lateral left foot and ankle Manual therapy  Ankle PROM  STM to lateral left foot and ankle                    HEP: Access Code: [email or web address]  URL: [email or web address]  Date: 01/07/2025  Prepared by: Yaakov Betancourt    Exercises  - Seated Toe Towel Scrunches  - 2-3 x daily - 7 x weekly - 2 sets - 10 reps  - Seated Heel Raise  - 2-3 x daily - 7 x weekly - 3 sets - 10 reps  - Heel Raises with Counter Support  - 2-3 x daily - 7 x weekly - 2 sets - 10 reps  - Standing Tandem Balance with Counter Support  - 2- x daily - 7 x weekly - 1 sets - 4 reps - 20 hold  - Long Sitting Ankle Inversion with Resistance  - 2 x daily - 7 x weekly - 2 sets - 15 reps  - Long Sitting Ankle Eversion with Resistance  - 2 x daily - 7 x weekly - 2 sets - 15 reps  - Long Sitting Ankle Plantar Flexion with Resistance  - 2 x daily - 7 x weekly - 2 sets - 15 reps  - Long Sitting Ankle Dorsiflexion with Anchored Resistance  - 2 x daily - 7 x weekly - 2 sets - 15 reps  - Side Stepping with Resistance at Ankles and Counter Support  - 2 x daily - 7 x weekly - 3 sets  - Forward and Backward Monster Walk with Resistance at Ankles and Counter Support  - 2 x daily - 7 x weekly - 3 sets  - toe yoga  - single  leg eccentric lowering from double leg heel raise  Charges: 1 MT 2 therex       Total Timed Treatment: 45 min  Total Treatment Time: 45 min

## 2025-01-30 DIAGNOSIS — E78.2 MIXED HYPERLIPIDEMIA: ICD-10-CM

## 2025-01-31 ENCOUNTER — TELEPHONE (OUTPATIENT)
Dept: CASE MANAGEMENT | Age: 55
End: 2025-01-31

## 2025-01-31 ENCOUNTER — TELEPHONE (OUTPATIENT)
Dept: SURGERY | Facility: CLINIC | Age: 55
End: 2025-01-31

## 2025-01-31 DIAGNOSIS — N20.0 NEPHROLITHIASIS: Primary | ICD-10-CM

## 2025-01-31 RX ORDER — ROSUVASTATIN CALCIUM 20 MG/1
20 TABLET, COATED ORAL NIGHTLY
Qty: 90 TABLET | Refills: 0 | OUTPATIENT
Start: 2025-01-31

## 2025-01-31 RX ORDER — ESCITALOPRAM OXALATE 20 MG/1
20 TABLET ORAL DAILY
Qty: 90 TABLET | Refills: 0 | OUTPATIENT
Start: 2025-01-31

## 2025-01-31 RX ORDER — BUPROPION HYDROCHLORIDE 300 MG/1
300 TABLET ORAL DAILY
Qty: 90 TABLET | Refills: 0 | OUTPATIENT
Start: 2025-01-31

## 2025-01-31 NOTE — TELEPHONE ENCOUNTER
Hello     I attempted to reach patient via phone. The patient was not available. I left a voicemail encouraging the patient to reschedule.     Patient is scheduled for CT abdomen and pelvis for 2.3.25. This request is pending health plan.     Please advise

## 2025-01-31 NOTE — TELEPHONE ENCOUNTER
I contacted patient today on 1/31/2025.  I have not met the patient in the clinical setting.  She did have an appointment with her nurse practitioner today which had to be canceled.    I looked ahead at my schedule and saw she was seeing me on 2/3/2025 at 10:30 AM.  I see that she had a CT scan at an outside hospital but we do not have the images we only have the report.  It appears she has an 8 mm proximal ureteral stone.    I asked patient if she could ensure that she brings me the images to that appointment on 2/3/2025.  She states that she will try her hardest to have the images overnighted to her house.  She will try and bring them to our appointment.  If she is unable to bring the disc or we are unable to view the images she will need to have a CT scan at our facility for me to review the images.    I have ordered a CT scan for her which she should schedule (try to schedule) for Monday, February 3 after her appointment with me.    In the case that she does have a ureteral stone or a large stone that needs to be treated can offer her definitive management of this. Will need to discuss the options in detail based on her imaging.    Will try to add her on for February 6, 2025.

## 2025-01-31 NOTE — TELEPHONE ENCOUNTER
Refill(s) Requested:   Requested Prescriptions     Refused Prescriptions Disp Refills    ROSUVASTATIN 20 MG Oral Tab [Pharmacy Med Name: ROSUVASTATIN 20MG TABLETS] 90 tablet 0     Sig: TAKE 1 TABLET(20 MG) BY MOUTH EVERY NIGHT     Medication Last Prescribed:  Rosuvastatin 20 mg 90 tablets 0 refills     Has dose or medication been changed    since last prescription? *Review notes*    []  Yes       [x]  No     Last office visit: 10/26/2024 (in-office)  Visit date not found (virtual visit)     Relevant details from LOV note:   Mixed hyperlipidemia  - rosuvastatin 20 MG Oral Tab; Take 1 tablet (20 mg total) by mouth nightly.  Dispense: 90 tablet; Refill: 0  - Lipid Panel  - Comp Metabolic Panel (14)  Clarified with patient's-patient has been taking atorvastatin out of confusion  Restart rosuvastatin since nausea with atorvastatin.  Complete fasting labs in 2-3 weeks  encourage mediterranean diet  Exercise brisk walk 30-60 mins at least 5 days weekly  Lose weight if overweight  Recheck fasting labs q 6 months     Relevant lab results:   Lab Results   Component Value Date     (H) 12/03/2021    BUN 17 12/03/2021    BUNCREA 23.9 (H) 09/17/2020    CREATSERUM 0.84 12/03/2021    ANIONGAP 5 12/03/2021    GFRNAA 80 12/03/2021    GFRAA 92 12/03/2021    CA 9.5 12/03/2021    OSMOCALC 298 (H) 12/03/2021    ALKPHO 93 12/03/2021    AST 22 12/03/2021    ALT 40 12/03/2021    BILT 0.4 12/03/2021    TP 7.5 12/03/2021    ALB 3.8 12/03/2021    GLOBULIN 3.7 12/03/2021     12/03/2021    K 4.2 12/03/2021     12/03/2021    CO2 26.0 12/03/2021     Lab Results   Component Value Date    CHOLEST 144 12/03/2021    TRIG 57 12/03/2021    HDL 48 12/03/2021    LDL 84 12/03/2021    VLDL 9 12/03/2021    NONHDLC 96 12/03/2021      Patient was asked to follow-up in: Return in about 5 months (around 3/26/2025) for annual physical, fasting labs AM, also follow-up 1 month virtual visit restless leg.     Appointment due: March 2025    Future  Appointments: Visit date not found     Action taken:  [x] Patient overdue for lab work. My chart message was sent to patient  [x] Request routed to provider for review - [ ] Labs abnormal or overdue

## 2025-02-03 ENCOUNTER — OFFICE VISIT (OUTPATIENT)
Dept: SURGERY | Facility: CLINIC | Age: 55
End: 2025-02-03

## 2025-02-03 DIAGNOSIS — N20.0 NEPHROLITHIASIS: Primary | ICD-10-CM

## 2025-02-03 PROCEDURE — 99204 OFFICE O/P NEW MOD 45 MIN: CPT | Performed by: UROLOGY

## 2025-02-03 NOTE — PROGRESS NOTES
Kennedi Clay MD  Department of Urology  [address] [place] [address] Rd[address]  [place] [address]    T: [phone]  F: [phone]    To: Jael Galindo DO   [address] [facility] [address]    Re: Felicita Jara   MRN: [id number]  : 1970    Dear Jael Galindo DO,    Today I had the pleasure of seeing Felicita Jara in my clinic. As you know, Ms. Jara is a pleasant 54 year old year old female who I am seeing for kidney stones. Patient was last seen in this department on Visit date not found.    Briefly, patient apparently had kidney stones at an outside hospital in [place].  We do not have the CT scan images but we do have the report.  Per report she had an 8 mm right UPJ stone with mild hydroureteronephrosis.  She also had a few additional nonobstructing bilateral calculi which are not detailed other than a 4 mm left kidney stone.  She apparently was counseled by urology in [place] on proceeding with ESWL but she did not proceed.    Today she states that she occasionally has some discomfort in her flank but she is not sure if this is a kidney stone or back pain.  She did not bring me the CT scan disc/images.    Please also note that she saw Felicita Trimble who performed a cystoscopy for gross hematuria which is negative.  CT urogram had not been completed at that time.  Is unclear if she had a cytology.       PAST MEDICAL HISTORY:  Past Medical History:    Abdominal distention    Alcoholism (HCC)    Allergic rhinitis    Sinus headaches    Anxiety    Arthritis    Bloating    Colon polyp    Decorative tattoo    Depression    Diarrhea, unspecified    Essential hypertension    Hemorrhoids    IBS (irritable bowel syndrome)    Irregular bowel habits    Nausea    Obesity    Im working on it    Osteoarthritis    Stress    Trigger finger    Visual impairment    glasses    Weight gain        PAST SURGICAL HISTORY:  Past Surgical History:   Procedure Laterality Date    Colonoscopy  2014     Colonoscopy  Tummy problems    Getting better    Hand/finger surgery unlisted      Other surgical history  Finger-pinky. Lipoma removed         ALLERGIES:  Allergies[1]      MEDICATIONS:  Current Outpatient Medications   Medication Instructions    aspirin 81 mg, Daily    buPROPion ER (WELLBUTRIN XL) 300 mg, Oral, Daily    Calcium Carbonate-Vit D-Min (CALCIUM-VITAMIN D-MINERALS) 600-800 MG-UNIT Oral Chew Tab 1 tablet, Oral, 2 times daily    escitalopram (LEXAPRO) 20 mg, Oral, Daily    methocarbamol (ROBAXIN) 500 mg    nabumetone (RELAFEN) 750 mg, 2 times daily with meals    rOPINIRole (REQUIP) 2 mg, Oral, Nightly    rosuvastatin (CRESTOR) 20 mg, Oral, Nightly    topiramate (TOPAMAX) 100 mg, Oral, Daily    valACYclovir (VALTREX) 2,000 mg, Oral, Every 12 hours scheduled    Vitamin D 200 mg, Daily        FAMILY HISTORY:  Family History   Adopted: Yes   Problem Relation Age of Onset    Thyroid Disorder Mother     Thyroid disease Mother     Other (gallbladder removal) Mother     Other (TIA) Mother     Thyroid Disorder Maternal Grandmother     Brain Cancer Maternal Grandmother     Breast Cancer Maternal Grandmother     Other (smoker) Maternal Grandmother     Heart Disease Maternal Grandfather     Brain Cancer Maternal Aunt     Cancer Maternal Aunt         lung    Other (smoker) Maternal Aunt     Cancer Maternal Aunt     No Known Problems Maternal Aunt         SOCIAL HISTORY:  Social History     Socioeconomic History    Marital status:    Tobacco Use    Smoking status: Former     Current packs/day: 0.00     Average packs/day: 0.3 packs/day for 15.0 years (3.8 ttl pk-yrs)     Types: Cigarettes     Start date: 2002     Quit date: 2017     Years since quittin.5    Smokeless tobacco: Never   Vaping Use    Vaping status: Never Used   Substance and Sexual Activity    Alcohol use: Not Currently     Alcohol/week: 0.0 standard drinks of alcohol     Comment: Quit 2019    Drug use: Never   Other  Topics Concern    Caffeine Concern Yes     Comment: Trying to quit drinking diet coke    Stress Concern No    Weight Concern Yes     Comment: Hard time losing weight    Special Diet Yes     Comment: Not doing well on Mediterranean diet    Exercise No    Seat Belt No          PHYSICAL EXAMINATION:  There were no vitals filed for this visit.  CONSTITUTIONAL: No apparent distress, cooperative and communicative  NEUROLOGIC: Alert and oriented   HEAD: Normocephalic, atraumatic   EYES: Sclera non-icteric   ENT: Hearing intact, moist mucous membranes   NECK: No obvious goiter or masses   RESPIRATORY: Normal respiratory effort, Nonlabored breathing on room air  SKIN: No evident rashes   ABDOMEN: Soft, nontender, nondistended, no rebound tenderness, no guarding, no masses    REVIEW OF SYSTEMS:    A comprehensive 10-point review of systems was completed.  Pertinent positives and negatives are noted in the the HPI.       LABORATORY DATA:  Component  Ref Range & Units 2 wk ago   SARS-CoV-2 PCR (COVID-19)  Not Detected Not Detected   Resulting Agency [facility]     Potassium  3.5 - 5.5 mmol/L 3.8    Sodium  133 - 145 mmol/L 143    Chloride  98 - 110 mmol/L 106    Glucose  70 - 99 mg/dL 126 High     Calcium  8.4 - 10.5 mg/dL 10    BUN  6 - 22 mg/dL 13    Creatinine  0.5 - 1.2 mg/dL 0.8    CO2  20 - 32 mmol/L 26    eGFR  >60.0 mL/min/1.73 sq.m. >60.0 eGFR calculation based on the Chronic Kidney Disease Epidemiology Collaboration (CKD-EPI) equation refit without adjustment for race.     This eGFR is validated for stable chronic renal failure patients. This equation is unreliable in acute illness or patients with normal renal function.   Anion Gap  3.0 - 15.0 mmol/L 11      Component  Ref Range & Units 2 wk ago   Source Urine    Urine Color  Colorless, Pale Yellow, Light Yellow, Yellow, Dark Yellow, Straw Pink Abnormal    Urine Clarity  Clear, Slightly Cloudy Clear   Urine pH  5.0 - 8.0 pH 7.5   Urine Protein  Screen  Negative, Trace mg/dL 30* Abnormal    Urine Glucose  Negative mg/dL Negative   Urine Ketones  Negative mg/dL Negative   Urine Occult Blood  Negative Large Abnormal    Urine Specific Gravity  1.005 - 1.030 1.005   Urine Nitrite  Negative Negative   Urine Leukocyte Esterase  Negative Trace Abnormal    Urine Bilirubin  Negative Negative   Urine Urobilinogen  <2.0 mg/dL 0.2   Urine RBC  Negative, 0-2 /hpf >100 Abnormal    Urine WBC  0 - 5 /hpf 0-2   Urine Bacteria  Negative Negative   Squamous Epithelial Cells  None, 0-2 /hpf 0-2   Hyaline Cast  0 - 2 /lpf 0-2     Component 2 wk ago   Culture No Growth             IMAGING REVIEW:  1. 8 mm RIGHT UPJ calculus with minimal upstream RIGHT pelvocaliectasis. A few additional smaller nonobstructing bilateral renal calculi.  2. Small gallstone without associated findings of cholecystitis.  3. No other significant acute pathology by CT.    Signed By: Jose Roberto Rogel MD on 1/15/2025 6:55 PM  Narrative    EXAM:  ABDOMINAL AND PELVIC CT WITHOUT CONTRAST.    CLINICAL INDICATION/HISTORY: 54-year-old with hematuria. Suspect genitourinary stone    COMPARISON: None    TECHNIQUE: Thinly collimated axial spiral data were acquired from the upper abdomen through the pelvis without IV or oral contrast. Additional coronal and sagittal reconstructions were obtained from the axial CT data. Automated exposure control was utilized for this exam.    FINDINGS:    Lung bases: No dense consolidation or significant pleural fluid is evident.    Liver: Unremarkable.    Gallbladder and bile ducts: 5 mm elongated gallstone noted along dependent aspect of nondistended otherwise unremarkable gallbladder (301:31; 601:23; 602:28).    Pancreas: No obvious pancreatic parenchymal mass lesions are evident. There is no pancreatic ductal dilatation. No prominent peripancreatic fluid collections are present.    Spleen: Unremarkable.    Adrenals: Unremarkable.    Kidneys and ureters: 8 mm calculus noted within  RIGHT ureteropelvic junction with minimal upstream RIGHT pelvocaliectasis without caliectasis (301:41; 601:33). Punctate nonobstructing calculus noted along lower pole RIGHT kidney (601:32). A few small nonobstructing calculi are noted within the midportion LEFT kidney, largest of which measures 4 mm (301:31 7:601:40). No stones noted within LEFT ureter or urinary bladder. No LEFT hydroureteronephrosis.    Stomach and bowel: Nondistended without obvious inflammation.    Appendix: Unremarkable.    Lymph nodes: No significant lymphadenopathy.    Vasculature: Regional arterial atherosclerotic calcifications present.    Intraperitoneal space: Unremarkable with no significant free fluid or pneumoperitoneum.    Urinary bladder: Unremarkable.    Reproductive: Unremarkable.    Bones/soft tissues: Unremarkable.  Procedure Note    Tye, Jose Roberto MCWILLIAMS MD - 01/15/2025  Formatting of this note might be different from the original.  EXAM:  ABDOMINAL AND PELVIC CT WITHOUT CONTRAST.    CLINICAL INDICATION/HISTORY: 54-year-old with hematuria. Suspect genitourinary stone    COMPARISON: None    TECHNIQUE: Thinly collimated axial spiral data were acquired from the upper abdomen through the pelvis without IV or oral contrast. Additional coronal and sagittal reconstructions were obtained from the axial CT data. Automated exposure control was utilized for this exam.    FINDINGS:    Lung bases: No dense consolidation or significant pleural fluid is evident.    Liver: Unremarkable.    Gallbladder and bile ducts: 5 mm elongated gallstone noted along dependent aspect of nondistended otherwise unremarkable gallbladder (301:31; 601:23; 602:28).    Pancreas: No obvious pancreatic parenchymal mass lesions are evident. There is no pancreatic ductal dilatation. No prominent peripancreatic fluid collections are present.    Spleen: Unremarkable.    Adrenals: Unremarkable.    Kidneys and ureters: 8 mm calculus noted within RIGHT ureteropelvic junction  with minimal upstream RIGHT pelvocaliectasis without caliectasis (301:41; 601:33). Punctate nonobstructing calculus noted along lower pole RIGHT kidney (601:32). A few small nonobstructing calculi are noted within the midportion LEFT kidney, largest of which measures 4 mm (301:31 7:601:40). No stones noted within LEFT ureter or urinary bladder. No LEFT hydroureteronephrosis.    Stomach and bowel: Nondistended without obvious inflammation.    Appendix: Unremarkable.    Lymph nodes: No significant lymphadenopathy.    Vasculature: Regional arterial atherosclerotic calcifications present.    Intraperitoneal space: Unremarkable with no significant free fluid or pneumoperitoneum.    Urinary bladder: Unremarkable.    Reproductive: Unremarkable.    Bones/soft tissues: Unremarkable.    IMPRESSION:    1. 8 mm RIGHT UPJ calculus with minimal upstream RIGHT pelvocaliectasis. A few additional smaller nonobstructing bilateral renal calculi.  2. Small gallstone without associated findings of cholecystitis.  3. No other significant acute pathology by CT.    Signed By: Jose Roberto Rogel MD on 1/15/2025 6:55 PM    Exam End: 01/15/25  5:40 PM   No chief complaint on file.       54yo F w gross hematuria referred for cysto     3 episodes of GH since last month. No other urinary symptoms.   She is sure that she saw blood in her urine and not from vagina.   She had pelvic US done that showed 2.5cm fibroid.     10-point ROS reviewed and negative unless stated in HPI     A procedure timeout was done. Staff and patient were identified.   The patient's information, procedure, and labeling on the   specimen containers were identified and confirmed by the patient   and staff present in the room. An informed consent was   reviewed/signed by the patient and witnessed by a medical   assistant.     CYSTOSCOPY     A procedure timeout was done. Staff and patient were identified.   The patient's information, procedure, and labeling on the   specimen  containers were identified and confirmed by the patient   and staff present in the room. An informed consent was   reviewed/signed by the patient and witnessed by a medical   assistant.     Anesthesia: 2% lidocaine gel     Urethra: Normal.  No strictures or lesions.   Bladder: Normal.  No tumor, stone, diverticulum, glomerulation,   or mucosal lesion.   U.O's: Normal position and configuration.   Trabeculation: none     POST CYSTOSCOPY MEDICATIONS:  None     DIAGNOSIS: 54yo F w gross hematuria     PLAN:   -Has not gotten CTU yet, cysto normal        OTHER RELEVANT DATA:   none     IMPRESSION: Report of an 8 mm proximal right ureteral stone-I do not have the images to review.  I did order CT scan for patient to complete if she is unable to bring me the disc.    Patient was counseled on stone prevention methods including hydration (until urine is clear), limiting salt and red meat/meat intake, eating a normal calcium diet, and drinking lemon with water. We also talked about reasons to go to the emergency room - namely acute flank pain associated with fevers, chills, nausea or vomiting.    We discussed ureteroscopy including how it is performed and associated risks. I reviewed risks including bleeding, infection, damage to surrounding structures (urethra, bladder, ureter, kidney), inability to treat the stone and need for stent alone, need for additional/multiple procedures, need for prolonged stent, need for nephrostomy tube, stent colic/discomfort (extreme flank pain, bladder discomfort/spasms, urinary urgency and frequency, hematuria), ureteral stricture, ureteral avulsion requiring open surgery, sepsis, anesthesia complications (cardiorespiratory complications).     I also counseled patient that NSAIDs and blood thinning agents need to be stopped appropriately prior to surgery. Discussed return precautions including fevers, chills, nausea, vomiting, intractable pain. Stent colic including flank pain, urgency,  frequency and blood in the urine is common. Patient agreed with the plan and understood the above.    We discussed ESWL including risks of pain, need for ureteroscopy, need for stent, [place]str[place].  I mentioned that ESWL may not be indicated for larger stones, and is more efficient if the stones are soft and an upper pole location.  The skin to stone distance is also important.    We also discussed observation with the understanding if she has flank pain and a fever that is an emergency and she must go to the emergency room.       PLAN:  CT abdomen pelvis-return to clinic after CT abdomen pelvis  Fevers, chills, flank pain she knows to go to the emergency room    Thank you for referring this very pleasant patient to my clinic. If you have any questions or concerns, please do not hesitate to contact me.    Sincerely,  Kennedi Clay MD    30 minutes were spent on this patient at this visit obtaining a history, reviewing medical records, developing a treatment plan, counseling and discussing treatment strategy with patient, coordination of care and documentation.     The 21st Century Cures Act makes medical notes available to patients in the interest of transparency.  However, please be advised that this is a medical document.  It is intended as a peer to peer communication.  It is written in medical language and may contain abbreviations or verbiage that are technical and unfamiliar.  It may appear blunt or direct.  Medical documents are intended to carry relevant information, facts as evident, and the clinical opinion of the practitioner.         [1]   Allergies  Allergen Reactions    Niacin FACE FLUSHING    Gluten Meal DIARRHEA     Gluten sensitivity

## 2025-02-07 ENCOUNTER — APPOINTMENT (OUTPATIENT)
Dept: PHYSICAL THERAPY | Facility: HOSPITAL | Age: 55
End: 2025-02-07
Attending: FAMILY MEDICINE
Payer: COMMERCIAL

## 2025-02-11 ENCOUNTER — APPOINTMENT (OUTPATIENT)
Dept: PHYSICAL THERAPY | Facility: HOSPITAL | Age: 55
End: 2025-02-11
Attending: FAMILY MEDICINE
Payer: COMMERCIAL

## 2025-02-11 ENCOUNTER — TELEPHONE (OUTPATIENT)
Dept: PHYSICAL THERAPY | Facility: HOSPITAL | Age: 55
End: 2025-02-11

## 2025-02-13 ENCOUNTER — APPOINTMENT (OUTPATIENT)
Dept: PHYSICAL THERAPY | Facility: HOSPITAL | Age: 55
End: 2025-02-13
Attending: FAMILY MEDICINE
Payer: COMMERCIAL

## 2025-02-13 ENCOUNTER — PATIENT MESSAGE (OUTPATIENT)
Dept: FAMILY MEDICINE CLINIC | Facility: CLINIC | Age: 55
End: 2025-02-13

## 2025-02-13 ENCOUNTER — TELEPHONE (OUTPATIENT)
Dept: PHYSICAL THERAPY | Facility: HOSPITAL | Age: 55
End: 2025-02-13

## 2025-02-13 DIAGNOSIS — E78.2 MIXED HYPERLIPIDEMIA: ICD-10-CM

## 2025-02-14 ENCOUNTER — OFFICE VISIT (OUTPATIENT)
Dept: SURGERY | Facility: CLINIC | Age: 55
End: 2025-02-14

## 2025-02-14 ENCOUNTER — TELEPHONE (OUTPATIENT)
Dept: SURGERY | Facility: CLINIC | Age: 55
End: 2025-02-14

## 2025-02-14 ENCOUNTER — TELEPHONE (OUTPATIENT)
Dept: PHYSICAL THERAPY | Facility: HOSPITAL | Age: 55
End: 2025-02-14

## 2025-02-14 ENCOUNTER — TELEPHONE (OUTPATIENT)
Dept: FAMILY MEDICINE CLINIC | Facility: CLINIC | Age: 55
End: 2025-02-14

## 2025-02-14 DIAGNOSIS — N20.0 KIDNEY STONE: Primary | ICD-10-CM

## 2025-02-14 DIAGNOSIS — R31.0 GROSS HEMATURIA: ICD-10-CM

## 2025-02-14 DIAGNOSIS — N32.9 LESION OF BLADDER: ICD-10-CM

## 2025-02-14 DIAGNOSIS — N20.0 NEPHROLITHIASIS: Primary | ICD-10-CM

## 2025-02-14 DIAGNOSIS — N20.0 NEPHROLITHIASIS: ICD-10-CM

## 2025-02-14 PROCEDURE — 99214 OFFICE O/P EST MOD 30 MIN: CPT | Performed by: UROLOGY

## 2025-02-14 NOTE — PROGRESS NOTES
Kennedi Clay MD  Department of Urology  [address] [place] [address] Rd[address]  [place] [address]    T: [phone]  F: [phone]    To: Jael Galindo DO   [address] [facility] [address]    Re: Felicita Jara   MRN: [id number]  : 1970    Dear Jael Galindo DO,    Today I had the pleasure of seeing Felicita Jara in my clinic. As you know, Ms. Jara is a pleasant 54 year old year old female who I am seeing for nephrolithiasis. Patient was last seen in this department on 2/3/2025.    Briefly, patient was noted to have a large kidney stone at an outside hospital.  She brought the imaging today which I reviewed.  I placed the images in the media section.  Please see my prior note from 2/3/2025.       PAST MEDICAL HISTORY:  Past Medical History:    Abdominal distention    Alcoholism (HCC)    Allergic rhinitis    Sinus headaches    Anxiety    Arthritis    Bloating    Colon polyp    Decorative tattoo    Depression    Diarrhea, unspecified    Essential hypertension    Hemorrhoids    IBS (irritable bowel syndrome)    Irregular bowel habits    Nausea    Obesity    Im working on it    Osteoarthritis    Stress    Trigger finger    Visual impairment    glasses    Weight gain        PAST SURGICAL HISTORY:  Past Surgical History:   Procedure Laterality Date    Colonoscopy  2014    Colonoscopy  Tummy problems    Getting better    Hand/finger surgery unlisted      Other surgical history  Finger-pinky. Lipoma removed         ALLERGIES:  Allergies[1]      MEDICATIONS:  Current Outpatient Medications   Medication Instructions    aspirin 81 mg, Daily    buPROPion ER (WELLBUTRIN XL) 300 mg, Oral, Daily    Calcium Carbonate-Vit D-Min (CALCIUM-VITAMIN D-MINERALS) 600-800 MG-UNIT Oral Chew Tab 1 tablet, Oral, 2 times daily    escitalopram (LEXAPRO) 20 mg, Oral, Daily    methocarbamol (ROBAXIN) 500 mg    nabumetone (RELAFEN) 750 mg, 2 times daily with meals    rOPINIRole (REQUIP) 2 mg, Oral, Nightly     rosuvastatin (CRESTOR) 20 mg, Oral, Nightly    topiramate (TOPAMAX) 100 mg, Oral, Daily    valACYclovir (VALTREX) 2,000 mg, Oral, Every 12 hours scheduled    Vitamin D 200 mg, Daily        FAMILY HISTORY:  Family History   Adopted: Yes   Problem Relation Age of Onset    Thyroid Disorder Mother     Thyroid disease Mother     Other (gallbladder removal) Mother     Other (TIA) Mother     Thyroid Disorder Maternal Grandmother     Brain Cancer Maternal Grandmother     Breast Cancer Maternal Grandmother     Other (smoker) Maternal Grandmother     Heart Disease Maternal Grandfather     Brain Cancer Maternal Aunt     Cancer Maternal Aunt         lung    Other (smoker) Maternal Aunt     Cancer Maternal Aunt     No Known Problems Maternal Aunt         SOCIAL HISTORY:  Social History     Socioeconomic History    Marital status:    Tobacco Use    Smoking status: Former     Current packs/day: 0.00     Average packs/day: 0.3 packs/day for 15.0 years (3.8 ttl pk-yrs)     Types: Cigarettes     Start date: 2002     Quit date: 2017     Years since quittin.5    Smokeless tobacco: Never   Vaping Use    Vaping status: Never Used   Substance and Sexual Activity    Alcohol use: Not Currently     Alcohol/week: 0.0 standard drinks of alcohol     Comment: Quit 2019    Drug use: Never   Other Topics Concern    Caffeine Concern Yes     Comment: Trying to quit drinking diet coke    Stress Concern No    Weight Concern Yes     Comment: Hard time losing weight    Special Diet Yes     Comment: Not doing well on Mediterranean diet    Exercise No    Seat Belt No          PHYSICAL EXAMINATION:  There were no vitals filed for this visit.  CONSTITUTIONAL: No apparent distress, cooperative and communicative  NEUROLOGIC: Alert and oriented   HEAD: Normocephalic, atraumatic   EYES: Sclera non-icteric   ENT: Hearing intact, moist mucous membranes   NECK: No obvious goiter or masses   RESPIRATORY: Normal respiratory effort,  Nonlabored breathing on room air  SKIN: No evident rashes   ABDOMEN: Soft, nontender, nondistended, no rebound tenderness, no guarding, no masses    REVIEW OF SYSTEMS:    A comprehensive 10-point review of systems was completed.  Pertinent positives and negatives are noted in the the HPI.       LABORATORY DATA:  SARS-CoV-2 PCR (COVID-19)  Not Detected Not Detected     ource Urine    Urine Color  Colorless, Pale Yellow, Light Yellow, Yellow, Dark Yellow, Straw Pink Abnormal    Urine Clarity  Clear, Slightly Cloudy Clear   Urine pH  5.0 - 8.0 pH 7.5   Urine Protein Screen  Negative, Trace mg/dL 30* Abnormal    Urine Glucose  Negative mg/dL Negative   Urine Ketones  Negative mg/dL Negative   Urine Occult Blood  Negative Large Abnormal    Urine Specific Gravity  1.005 - 1.030 1.005   Urine Nitrite  Negative Negative   Urine Leukocyte Esterase  Negative Trace Abnormal    Urine Bilirubin  Negative Negative   Urine Urobilinogen  <2.0 mg/dL 0.2   Urine RBC  Negative, 0-2 /hpf >100 Abnormal            IMAGING REVIEW:  Narrative   PROCEDURE:  XR FOOT, COMPLETE (MIN 3 VIEWS), LEFT (CPT=73630)     TECHNIQUE:  AP, oblique, and lateral views were obtained.     COMPARISON:  [facility], XR, XR FOOT, COMPLETE (MIN 3 VIEWS), LEFT (CPT=73630), 11/27/2024, 9:16 AM.     INDICATIONS:  M79.672 Left foot pain     PATIENT STATED HISTORY: (As transcribed by Technologist)  Ortho follow-up. Patient fractured her left 5th metatarsal months ago and wore a boot for 3 months. History of osteopenia.         FINDINGS:    BONES:  There is bony fusion across the 5th metatarsal fracture with continued minimal displacement medially of the distal fragment by 3 mm.  There is moderate degenerative changes of the 1st MTP joint.  There is diffuse osteopenia.    SOFT TISSUES:  Negative.  No visible soft tissue swelling.  EFFUSION:  None visible.  OTHER:  Negative.                   Impression   CONCLUSION:  Healed fracture involving the left 5th metatarsal  shaft.        LOCATION:  [place]        Dictated by (CST): Tom Friedman MD on 1/08/2025 at 2:34 PM      Finalized by (CST): Tom Friedman MD on 1/08/2025 at 2:36        OTHER RELEVANT DATA:   none     IMPRESSION: 8 mm right proximal ureteral stone and nonobstructing stone within the kidney-recommended/offered ureteroscopy.  She would like to proceed.    We discussed ureteroscopy including how it is performed and associated risks. I reviewed risks including bleeding, infection, damage to surrounding structures (urethra, bladder, ureter, kidney), inability to treat the stone and need for stent alone, need for additional/multiple procedures, need for prolonged stent, need for nephrostomy tube, stent colic/discomfort (extreme flank pain, bladder discomfort/spasms, urinary urgency and frequency, hematuria), ureteral stricture, ureteral avulsion requiring open surgery, sepsis, anesthesia complications (cardiorespiratory complications).     I also counseled patient that NSAIDs and blood thinning agents need to be stopped appropriately prior to surgery. Discussed return precautions including fevers, chills, nausea, vomiting, intractable pain. Stent colic including flank pain, urgency, frequency and blood in the urine is common. Patient agreed with the plan and understood the above.       PLAN:  OR: Cystoscopy, right ureteroscopy, laser lithotripsy, stent placement  CBC, Chem-7, INR, urine culture  Fevers or chills she has been in the ER  Imaging sent for uploading    Thank you for referring this very pleasant patient to my clinic. If you have any questions or concerns, please do not hesitate to contact me.    Sincerely,  Kennedi Clay MD    30 minutes were spent on this patient at this visit obtaining a history, reviewing medical records, developing a treatment plan, counseling and discussing treatment strategy with patient, coordination of care and documentation.     The 21st Century Cures Act makes medical notes  available to patients in the interest of transparency.  However, please be advised that this is a medical document.  It is intended as a peer to peer communication.  It is written in medical language and may contain abbreviations or verbiage that are technical and unfamiliar.  It may appear blunt or direct.  Medical documents are intended to carry relevant information, facts as evident, and the clinical opinion of the practitioner.         [1]   Allergies  Allergen Reactions    Niacin FACE FLUSHING    Gluten Meal DIARRHEA     Gluten sensitivity

## 2025-02-14 NOTE — TELEPHONE ENCOUNTER
Pt seen in clinic -surgery scheduled for 3/6/25 with Dr Clay.     Surgery information given to pt and provided thru portal.     Pt aware and verbalized understanding of preoperative labs ordered by MD, to complete 7- 10 days prior to surgery.

## 2025-02-14 NOTE — TELEPHONE ENCOUNTER
Hi!  This patient needs a ureteroscopy. Should be booked for 45 minutes on march 6th. Needs labs as ordered below. I did not place case request.    Thanks!  AR    Urology Surgery Scheduling Request    Location: [facility] OR    Surgeon: LEILA Clay MD    Asst. Surgeon: N/A    Diagnosis: Nephrolithiasis    Procedure: Cystoscopy right ureteroscopy, laser lithotripsy, retrograde pyelogram, stent placement     Anesthesia: General     Time Frame: march 6th    Time needed: 45 minutes    Special Equipment: Holmium Laser    On Call to OR: ancef    Admission: Day Surgery    Pre-op Testing: CBC, CMP, PT/INR and Urine Culture     Need Pre-op Clearance: none    Estimated Post Op/Follow Up Appt: tbd.    Kennedi Clay MD

## 2025-02-18 ENCOUNTER — TELEPHONE (OUTPATIENT)
Dept: SURGERY | Facility: CLINIC | Age: 55
End: 2025-02-18

## 2025-02-18 ENCOUNTER — OFFICE VISIT (OUTPATIENT)
Dept: PHYSICAL THERAPY | Facility: HOSPITAL | Age: 55
End: 2025-02-18
Attending: FAMILY MEDICINE
Payer: COMMERCIAL

## 2025-02-18 PROCEDURE — 97110 THERAPEUTIC EXERCISES: CPT

## 2025-02-18 PROCEDURE — 97140 MANUAL THERAPY 1/> REGIONS: CPT

## 2025-02-18 RX ORDER — ROSUVASTATIN CALCIUM 20 MG/1
20 TABLET, COATED ORAL NIGHTLY
Qty: 30 TABLET | Refills: 0 | Status: SHIPPED | OUTPATIENT
Start: 2025-02-18

## 2025-02-18 NOTE — TELEPHONE ENCOUNTER
Ct scan done 1/15/25 [facility] disc brought to [facility] file room to be uploaded to [facility].  
Patient baseline mental status

## 2025-02-18 NOTE — PROGRESS NOTES
Diagnosis:   Closed displaced fracture of fifth metatarsal bone of left foot with routine healing, subsequent encounter (S92.352D)       Referring Provider: No ref. provider found  Date of Evaluation:    1/2/2025    Precautions:   osteopenia Next MD visit:   none scheduled  Date of Surgery: n/a   Insurance Primary/Secondary: [place] P[place] / N/A     # Auth Visits: POC 8 visits           Subjective: Pt reports she was recently sick so this limited ability to come to sessions. Foot is doing pretty well all things considered. Has been moving around more and no adverse reaction to this. Does not really feel limited in anything due to the foot.     Pain: 0/10      Objective: see tx flow sheet for progressions  2/18/2025  - Pt able to complete shuttle press for BLE strength, no pain in foot and equal weight bearing throughout foot. Cues needed for TKE on LLE.       Assessment: Pt returns to therapy after break due to illness. Pt subjectively reports foot has had no pain and continued with HEP as able with no issues thus far. Pt has some tenderness on plantar aspect of foot near lateral toes with manual intervention. Pt able to follow up with exercises for ankle and foot stability with no pain. Cues throughout to slow down motions and for form. Pt completes DL and SL shuttle press with no pain in foot, however cues needed for TKE throughout.       Goals: (to be met in 8 visits) Progressing, 2/18/2025  Pt will demonstrate improved DF AROM to >= 10 degrees to promote proper foot clearance during gait and greater ease descending stairs without compensation  Pt will have increased ankle strength to 5/5 throughout for improved ankle control with ADLs such as prolonged gait and stair negotiation (  Pt will have improved SLS to >30s for increased ankle stability with ambulation on uneven surfaces such as gravel and grass   Pt will report <1/10 pain with work and home activities such as standing for school classes   Pt will be  independent and compliant with comprehensive HEP to maintain progress achieved in PT    Plan: continue with strengthening, return to functional activities  Date: 1/7/2025  TX#: 2/8 Date:1/28/2025                 TX#: 3/8 Date: 2/18/2025              TX#: 4/8 Date:                 TX#: 5/ Date:   Tx#: 6/   TherEx  4 way ankle banded range x 15 YTB, x 15 RTB  [place]  on L foot  Towel scrunches on L x 10  Heel-toe walking in // bars focusing pushup off.   Bilateral gastroc stretch 2 x 30 sec hold  Standing gastroc stretch 3 x 20 sec hold  Side stepping YTB around ankle in // bars x 3 laps  Fwd/bkwd monster walks YTB around ankle in // bars x 3 laps TherEx  4 way ankle banded range 2 x 15 RTB  Towel scrunches on L x 10  [place] yoga x 20  Side stepping in // bars RTB x 5 laps   Fwd/bkwd monster walks RTB around ankle in // bars x 3 laps  DL heel raise x 20   Bilateral gastroc stretch 2 x 30 sec hold  Standing gastroc stretch 3 x 20 sec hold  Eccentric single leg lowering from heel raise x 15   TE: 32  Upright bike, level 2, 5 min   4 way ankle banded range 2 x 15 RTB  DLHR 2x10    Eccentric single leg lowering from heel raise 2x10   Standing gastroc stretch 3 x 20 sec hold  DL shuttle press, #62, 2x15  SL shuttle press, #62, x15 bilat        Manual therapy  Ankle PROM  STM to lateral left foot and ankle Manual therapy  Ankle PROM  STM to lateral left foot and ankle Manual: 10 min  Ankle PROM  STM to lateral left foot and ankle  Intertarsal glides                   HEP: Access Code: [email or web address]  URL: [email or web address]  Date: 01/07/2025  Prepared by: Yaakov Betancourt    Exercises  - Seated Toe Towel Scrunches  - 2-3 x daily - 7 x weekly - 2 sets - 10 reps  - Seated Heel Raise  - 2-3 x daily - 7 x weekly - 3 sets - 10 reps  - Heel Raises with Counter Support  - 2-3 x daily - 7 x weekly - 2 sets - 10 reps  - Standing Tandem Balance with Counter Support  - 2- x daily - 7 x weekly - 1 sets - 4 reps - 20  hold  - Long Sitting Ankle Inversion with Resistance  - 2 x daily - 7 x weekly - 2 sets - 15 reps  - Long Sitting Ankle Eversion with Resistance  - 2 x daily - 7 x weekly - 2 sets - 15 reps  - Long Sitting Ankle Plantar Flexion with Resistance  - 2 x daily - 7 x weekly - 2 sets - 15 reps  - Long Sitting Ankle Dorsiflexion with Anchored Resistance  - 2 x daily - 7 x weekly - 2 sets - 15 reps  - Side Stepping with Resistance at Ankles and Counter Support  - 2 x daily - 7 x weekly - 3 sets  - Forward and Backward Monster Walk with Resistance at Ankles and Counter Support  - 2 x daily - 7 x weekly - 3 sets  - toe yoga  - single leg eccentric lowering from double leg heel raise  Charges: 1 MT 2 therex       Total Timed Treatment: 42 min  Total Treatment Time: 42 min

## 2025-02-20 ENCOUNTER — OFFICE VISIT (OUTPATIENT)
Dept: PHYSICAL THERAPY | Facility: HOSPITAL | Age: 55
End: 2025-02-20
Attending: FAMILY MEDICINE
Payer: COMMERCIAL

## 2025-02-20 PROCEDURE — 97140 MANUAL THERAPY 1/> REGIONS: CPT

## 2025-02-20 PROCEDURE — 97110 THERAPEUTIC EXERCISES: CPT

## 2025-02-20 NOTE — PROGRESS NOTES
Diagnosis:   Closed displaced fracture of fifth metatarsal bone of left foot with routine healing, subsequent encounter (S92.352D)       Referring Provider: No ref. provider found  Date of Evaluation:    1/2/2025    Precautions:   osteopenia Next MD visit:   none scheduled  Date of Surgery: n/a   Insurance Primary/Secondary: [place] / N/A     # Auth Visits: POC 8 visits           Subjective: Pt felt some soreness after last session, but more workout sore than pain.      Pain: 0/10      Objective: see tx flow sheet for progressions  2/18/2025  - Pt able to complete shuttle press for BLE strength, no pain in foot and equal weight bearing throughout foot. Cues needed for TKE on LLE.       Assessment: Good tolerance for exercises today with varying stance and on complaint surfaces. Pt able to complete lunge to BOSU with good ankle stability and no reports of pain. Pt has some increased difficulty with tandem stance and weight pass, increased accessory ankle motions noted bilaterally, however no pain with activity. Excellent progress towards achievement of goals.       Goals: (to be met in 8 visits) Progressing, 2/18/2025  Pt will demonstrate improved DF AROM to >= 10 degrees to promote proper foot clearance during gait and greater ease descending stairs without compensation  Pt will have increased ankle strength to 5/5 throughout for improved ankle control with ADLs such as prolonged gait and stair negotiation (  Pt will have improved SLS to >30s for increased ankle stability with ambulation on uneven surfaces such as gravel and grass   Pt will report <1/10 pain with work and home activities such as standing for school classes   Pt will be independent and compliant with comprehensive HEP to maintain progress achieved in PT    Plan: continue with strengthening, return to functional activities  Date: 1/7/2025  TX#: 2/8 Date:1/28/2025                 TX#: 3/8 Date: 2/18/2025              TX#: 4/8 Date: 2/20/2025              TX#: 5/8 Date:   Tx#: 6/   TherEx  4 way ankle banded range x 15 YTB, x 15 RTB  [place]  on L foot  Towel scrunches on L x 10  Heel-toe walking in // bars focusing pushup off.   Bilateral gastroc stretch 2 x 30 sec hold  Standing gastroc stretch 3 x 20 sec hold  Side stepping YTB around ankle in // bars x 3 laps  Fwd/bkwd monster walks YTB around ankle in // bars x 3 laps TherEx  4 way ankle banded range 2 x 15 RTB  Towel scrunches on L x 10  [place] yoga x 20  Side stepping in // bars RTB x 5 laps   Fwd/bkwd monster walks RTB around ankle in // bars x 3 laps  DL heel raise x 20   Bilateral gastroc stretch 2 x 30 sec hold  Standing gastroc stretch 3 x 20 sec hold  Eccentric single leg lowering from heel raise x 15   TE: 32  Upright bike, level 2, 5 min   4 way ankle banded range 2 x 15 RTB  DLHR 2x10    Eccentric single leg lowering from heel raise 2x10   Standing gastroc stretch 3 x 20 sec hold  DL shuttle press, #62, 2x15  SL shuttle press, #62, x15 bilat    TE: 33  Upright bike, level 2, 5 min   4 way ankle banded range 2 x 15 GTB  DL shuttle press, #75, 2x15  SL shuttle press, #62, x20 bilat   Wobble board, x2 min A/P and M/L   Lunge to BOSU, x10 bilat  Lat lunge to BOSU, x10 bilat   Tandem balance with 7# weight pass, 2x30 sec bilat    Manual therapy  Ankle PROM  STM to lateral left foot and ankle Manual therapy  Ankle PROM  STM to lateral left foot and ankle Manual: 10 min  Ankle PROM  STM to lateral left foot and ankle  Intertarsal glides Manual: 10 min  Ankle PROM  STM to lateral left foot and ankle  Intertarsal glides                  HEP: Access Code: [email or web address]  URL: [email or web address]  Date: 01/07/2025  Prepared by: Yaakov Betancourt    Exercises  - Seated Toe Towel Scrunches  - 2-3 x daily - 7 x weekly - 2 sets - 10 reps  - Seated Heel Raise  - 2-3 x daily - 7 x weekly - 3 sets - 10 reps  - Heel Raises with Counter Support  - 2-3 x daily - 7 x weekly - 2 sets - 10 reps  -  Standing Tandem Balance with Counter Support  - 2- x daily - 7 x weekly - 1 sets - 4 reps - 20 hold  - Long Sitting Ankle Inversion with Resistance  - 2 x daily - 7 x weekly - 2 sets - 15 reps  - Long Sitting Ankle Eversion with Resistance  - 2 x daily - 7 x weekly - 2 sets - 15 reps  - Long Sitting Ankle Plantar Flexion with Resistance  - 2 x daily - 7 x weekly - 2 sets - 15 reps  - Long Sitting Ankle Dorsiflexion with Anchored Resistance  - 2 x daily - 7 x weekly - 2 sets - 15 reps  - Side Stepping with Resistance at Ankles and Counter Support  - 2 x daily - 7 x weekly - 3 sets  - Forward and Backward Monster Walk with Resistance at Ankles and Counter Support  - 2 x daily - 7 x weekly - 3 sets  - toe yoga  - single leg eccentric lowering from double leg heel raise  Charges: 1 MT 2 therex       Total Timed Treatment: 43 min  Total Treatment Time: 43 min

## 2025-02-24 ENCOUNTER — LAB ENCOUNTER (OUTPATIENT)
Dept: LAB | Facility: HOSPITAL | Age: 55
End: 2025-02-24
Attending: UROLOGY
Payer: COMMERCIAL

## 2025-02-24 LAB
INR BLD: 0.92 (ref 0.8–1.2)
PROTHROMBIN TIME: 12.9 SECONDS (ref 11.6–14.8)

## 2025-02-24 PROCEDURE — 87086 URINE CULTURE/COLONY COUNT: CPT | Performed by: UROLOGY

## 2025-02-24 PROCEDURE — 85025 COMPLETE CBC W/AUTO DIFF WBC: CPT | Performed by: FAMILY MEDICINE

## 2025-02-24 PROCEDURE — 80061 LIPID PANEL: CPT | Performed by: FAMILY MEDICINE

## 2025-02-24 PROCEDURE — 85610 PROTHROMBIN TIME: CPT | Performed by: UROLOGY

## 2025-02-24 PROCEDURE — 80053 COMPREHEN METABOLIC PANEL: CPT | Performed by: FAMILY MEDICINE

## 2025-02-25 ENCOUNTER — APPOINTMENT (OUTPATIENT)
Dept: PHYSICAL THERAPY | Facility: HOSPITAL | Age: 55
End: 2025-02-25
Attending: FAMILY MEDICINE
Payer: COMMERCIAL

## 2025-02-25 ENCOUNTER — TELEPHONE (OUTPATIENT)
Dept: PHYSICAL THERAPY | Facility: HOSPITAL | Age: 55
End: 2025-02-25

## 2025-02-27 ENCOUNTER — OFFICE VISIT (OUTPATIENT)
Dept: PHYSICAL THERAPY | Facility: HOSPITAL | Age: 55
End: 2025-02-27
Attending: FAMILY MEDICINE
Payer: COMMERCIAL

## 2025-02-27 DIAGNOSIS — E78.2 MIXED HYPERLIPIDEMIA: ICD-10-CM

## 2025-02-27 PROCEDURE — 97110 THERAPEUTIC EXERCISES: CPT

## 2025-02-27 RX ORDER — ROSUVASTATIN CALCIUM 20 MG/1
20 TABLET, COATED ORAL NIGHTLY
Qty: 30 TABLET | Refills: 0 | Status: SHIPPED | OUTPATIENT
Start: 2025-02-27

## 2025-02-27 NOTE — PROGRESS NOTES
Diagnosis:   Closed displaced fracture of fifth metatarsal bone of left foot with routine healing, subsequent encounter (S92.352D)       Referring Provider: No ref. provider found  Date of Evaluation:    1/2/2025    Precautions:   osteopenia Next MD visit:   none scheduled  Date of Surgery: n/a   Insurance Primary/Secondary: [place] / N/A     # Auth Visits: POC 8 visits          Discharge Summary  Pt has attended 6 visits in Physical Therapy.     Subjective: Pt feels like the foot is back to normal. No pain recently or limitations.     Pain: 0/10      Objective:  AROM: (* denotes performed with pain)  Hip Knee Foot/Ankle   Flexion: R WFL; L WFL  Extension: R WFL; L WFL  Abduction: R WFL; L WFL  ER: R WFL; L WFL  IR: R WFL; L WFL Flexion: R WFL; L WFL  Extension: R WFL; L WFL    DF: R WFL; L 10 deg  PF: R WFL; L 50 deg  INV: R WFL; L 40 deg  EV: R WFL; L 28 deg  Great toe ext: R WFL; L 10 deg        Strength/MMT: (* denotes performed with pain)  Hip Knee Foot/Ankle   Flexion: R 5/5; L 5/5  Extension: R 5/5; L 5/5  Abduction: R 5/5; L 5/5  ER: R 5/5; L 5/5  IR: R 5/5; L 5/5 Flexion: R 5/5; L 5/5  Extension: R 5/5; L 5/5    DF: R 5/5; L 5/5  PF: R 5/5; L 5/5 (x10 SLHR)  INV: R 5/5; L 5/5  EV: R 5/5; L 4+/5  Great toe ext: R 5/5; L 4+/5      Gait: pt ambulates on level ground with normal mechanics  Balance: SLS: R 30 sec, L 30 sec      Assessment: Pt has attended 6 visits in outpatient physical therapy. In this time, pt has been able to improve strength, range of motion and stability following fracture of 5th metatarsal. Pt subjectively reports no pain or ongoing limitations recently. Pt able to maintain SLS for 30 sec on LLE compared to previous 6 sec, thus increased ankle joint stability. Discussed with pt continuing HEP with blue band for increased challenge a few times per week. At this time, pt to be discharged from PT continuing at home with HEP.       Goals: (to be met in 8 visits) ALL MET  Pt will  demonstrate improved DF AROM to >= 10 degrees to promote proper foot clearance during gait and greater ease descending stairs without compensation  Pt will have increased ankle strength to 5/5 throughout for improved ankle control with ADLs such as prolonged gait and stair negotiation   Pt will have improved SLS to >30s for increased ankle stability with ambulation on uneven surfaces such as gravel and grass   Pt will report <1/10 pain with work and home activities such as standing for school classes   Pt will be independent and compliant with comprehensive HEP to maintain progress achieved in PT    LEFS Score  LEFS Score: (Patient-Rptd) 67.5 % (1/1/2025  5:44 PM)    Post LEFS Score  Post LEFS Score: (Patient-Rptd) 95 % (2/27/2025 12:01 PM)    27.5 % improvement    Plan: Discharge    Patient/Family/Caregiver was advised of these findings, precautions, and treatment options and has agreed to actively participate in planning and for this course of care.    Thank you for your referral. If you have any questions, please contact me at Dept: [phone].    Sincerely,  Electronically signed by therapist: Tonya Leos PT, DPT    Certification From: 2/27/2025  To:5/28/2025     Date: 1/7/2025  TX#: 2/8 Date:1/28/2025                 TX#: 3/8 Date: 2/18/2025              TX#: 4/8 Date: 2/20/2025             TX#: 5/8 Date: 2/27/2025  Tx#: 6/8   TherEx  4 way ankle banded range x 15 YTB, x 15 RTB  [place]  on L foot  Towel scrunches on L x 10  Heel-toe walking in // bars focusing pushup off.   Bilateral gastroc stretch 2 x 30 sec hold  Standing gastroc stretch 3 x 20 sec hold  Side stepping YTB around ankle in // bars x 3 laps  Fwd/bkwd monster walks YTB around ankle in // bars x 3 laps TherEx  4 way ankle banded range 2 x 15 RTB  Towel scrunches on L x 10  [place] yoga x 20  Side stepping in // bars RTB x 5 laps   Fwd/bkwd monster walks RTB around ankle in // bars x 3 laps  DL heel raise x 20   Bilateral gastroc stretch 2 x  30 sec hold  Standing gastroc stretch 3 x 20 sec hold  Eccentric single leg lowering from heel raise x 15   TE: 32  Upright bike, level 2, 5 min   4 way ankle banded range 2 x 15 RTB  DLHR 2x10    Eccentric single leg lowering from heel raise 2x10   Standing gastroc stretch 3 x 20 sec hold  DL shuttle press, #62, 2x15  SL shuttle press, #62, x15 bilat    TE: 33  Upright bike, level 2, 5 min   4 way ankle banded range 2 x 15 GTB  DL shuttle press, #75, 2x15  SL shuttle press, #62, x20 bilat   Wobble board, x2 min A/P and M/L   Lunge to BOSU, x10 bilat  Lat lunge to BOSU, x10 bilat   Tandem balance with 7# weight pass, 2x30 sec bilat TE: 40  Upright bike, level 2, 6 min   Reassessment in objective   DL shuttle press, #75, 2x20  SL shuttle press, #62, x20 bilat   Wobble board, x2 min A/P and M/L   SLHR, 2x10 bilat  Ankle 4 way w/ blue band, 2x15 each way   Pt education: continuing HEP at home, blue band to increase challenge.    Manual therapy  Ankle PROM  STM to lateral left foot and ankle Manual therapy  Ankle PROM  STM to lateral left foot and ankle Manual: 10 min  Ankle PROM  STM to lateral left foot and ankle  Intertarsal glides Manual: 10 min  Ankle PROM  STM to lateral left foot and ankle  Intertarsal glides                  HEP: Access Code: [email or web address]  URL: [email or web address]  Date: 01/07/2025  Prepared by: Yaakov Betancourt    Exercises  - Seated Toe Towel Scrunches  - 2-3 x daily - 7 x weekly - 2 sets - 10 reps  - Seated Heel Raise  - 2-3 x daily - 7 x weekly - 3 sets - 10 reps  - Heel Raises with Counter Support  - 2-3 x daily - 7 x weekly - 2 sets - 10 reps  - Standing Tandem Balance with Counter Support  - 2- x daily - 7 x weekly - 1 sets - 4 reps - 20 hold  - Long Sitting Ankle Inversion with Resistance  - 2 x daily - 7 x weekly - 2 sets - 15 reps  - Long Sitting Ankle Eversion with Resistance  - 2 x daily - 7 x weekly - 2 sets - 15 reps  - Long Sitting Ankle Plantar Flexion with  Resistance  - 2 x daily - 7 x weekly - 2 sets - 15 reps  - Long Sitting Ankle Dorsiflexion with Anchored Resistance  - 2 x daily - 7 x weekly - 2 sets - 15 reps  - Side Stepping with Resistance at Ankles and Counter Support  - 2 x daily - 7 x weekly - 3 sets  - Forward and Backward Monster Walk with Resistance at Ankles and Counter Support  - 2 x daily - 7 x weekly - 3 sets  - toe yoga  - single leg eccentric lowering from double leg heel raise  Charges: 3 therex       Total Timed Treatment: 40 min  Total Treatment Time: 40 min

## 2025-02-28 RX ORDER — ONDANSETRON 4 MG/1
4 TABLET, ORALLY DISINTEGRATING ORAL EVERY 8 HOURS PRN
COMMUNITY

## 2025-03-05 NOTE — H&P
PRE-OP NOTE     NAME/MRN/PROCEDURE: Felicita Jara - cystoscopy, right urs, ll/stent  HPI: 54-year-old woman who was noted to have a large kidney stone at outside hospital.  She was counseled on ureteroscopy  PMH: Anxiety, arthritis, alcohol use, hemorrhoids, irritable bowel syndrome, obesity, nausea, trigger finger  PSH: Colonoscopy, and finger surgery  MEDS: Aspirin, calcium carbonate, bupropion, escitalopram, methocarbamol, mebutamate, ropinirole, rosuvastatin, topiramate, Valtrex, vitamin D  ALL: Niacin, gluten meal  LABS: Urine culture negative, INR 0.92, hematocrit 38.6, creatinine 0.91; no other positive urine cultures will follow  IMAGING:     OTHER:   NAD, cooperative, communicative  Nonlabored breathing on room air  Soft, ND, NT, no guarding, no RT  wwp    ABX: ancef       FAMILY HISTORY:  Family History         Family History   Adopted: Yes   Problem Relation Age of Onset    Thyroid Disorder Mother      Thyroid disease Mother      Other (gallbladder removal) Mother      Other (TIA) Mother      Thyroid Disorder Maternal Grandmother      Brain Cancer Maternal Grandmother      Breast Cancer Maternal Grandmother      Other (smoker) Maternal Grandmother      Heart Disease Maternal Grandfather      Brain Cancer Maternal Aunt      Cancer Maternal Aunt           lung    Other (smoker) Maternal Aunt      Cancer Maternal Aunt      No Known Problems Maternal Aunt              SOCIAL HISTORY:  Short Social Hx on File[]Expand by Default   Social History            Socioeconomic History    Marital status:    Tobacco Use    Smoking status: Former       Current packs/day: 0.00       Average packs/day: 0.3 packs/day for 15.0 years (3.8 ttl pk-yrs)       Types: Cigarettes       Start date: 2002       Quit date: 2017       Years since quittin.5    Smokeless tobacco: Never   Vaping Use    Vaping status: Never Used   Substance and Sexual Activity    Alcohol use: Not Currently       Alcohol/week: 0.0  standard drinks of alcohol       Comment: Quit August 21, 2019    Drug use: Never   Other Topics Concern    Caffeine Concern Yes       Comment: Trying to quit drinking diet coke    Stress Concern No    Weight Concern Yes       Comment: Hard time losing weight    Special Diet Yes       Comment: Not doing well on Mediterranean diet    Exercise No    Seat Belt No

## 2025-03-05 NOTE — DISCHARGE INSTRUCTIONS
DISCHARGE INSTRUCTIONS    You should expect to have some blood in your urine, burning when you pee, urgency and frequency. This is normal. If you have a fever >101F, chills, nausea, vomiting, inability to urinate please go to the emergency room for evaluation.   Pain is normal after this procedure. Try to take Tylenol ibuprofen and use hot packs. Next ibuprofen can be taken at 230pm today.  Next tylenol may be taken at 11am today. Do not take torodol with ibuprofen.  Next torodol may be taken at 230pm today.  Drink as much water as possible. Additionally you can try over the counter pyridium if you notice bladder discomfort.Toradol is very harsh on the gut. Do not take with advil/aspirin/ibuprofen. Take with food. Take sparingly.    We have also prescribed you 3 days of an antibiotic that I request that you take.    You should remove your stent in 7 days. It is taped to the top part of your groin. You can take the tape off and pull the black string. You should see 2 curls of the stent when it is removed. If you do not feel comfortable removing it yourself you should contact us and we can help arrange follow up. Please BE CAREFUL not to inadvertently pull on the black string (stent) taped to your groin. This may cause the stent to dislodge slightly and lead to continuous urine leakage (incontinence). We will ask you to wear a pad or depend if this happens until your stent can come out (7 days).  You have a follow up in 6 to 8 weeks with a renal bladder ultrasound and follow-up with your physician or a nurse practitioner. This is to make sure that your kidney still looks healthy  Stone prevention methods including hydration (until urine is clear), limiting salt and red meat/meat intake, eating a normal calcium diet, and drinking lemon with water. We also talked about reasons to go to the emergency room - namely acute flank pain associated with fevers, chills, nausea or vomiting.   Call T: [phone] if you have any  questions or concerns    TO DO:  1. Schedule ultrasound 6-8 weeks after stent is removed  2. Schedule follow up with Dr. Clay 1 week after ultrasound      [facility] [place] HOME CARE INSTRUCTIONS: POST-OP ANESTHESIA  The medication that you received for sedation or general anesthesia can last up to 24 hours. Your judgment and reflexes may be altered, even if you feel like your normal self.      We Recommend:   Do not drive any motor vehicle or bicycle   Avoid mowing the lawn, playing sports, or working with power tools/applicances (power saws, electric knives or mixers)   That you have someone stay with you on your first night home   Do not drink alcohol or take sleeping pills or tranquilizers   Do not sign legal documents within 24 hours of your procedure   If you had a nerve block for your surgery, take extra care not to put any pressure on your arm or hand for 24 hours    It is normal:  For you to have a sore throat if you had a breathing tube during surgery (while you were asleep!). The sore throat should get better within 48 hours. You can gargle with warm salt water (1/2 tsp in 4 oz warm water) or use a throat lozenge for comfort  To feel muscle aches or soreness especially in the abdomen, chest or neck. The achy feeling should go away in the next 24 hours  To feel weak, sleepy or \"wiped out\". Your should start feeling better in the next 24 hours.   To experience mild discomforts such as sore lip or tongue, headache, cramps, gas pains or a bloated feeling in your abdomen.   To experience mild back pain or soreness for a day or two if you had spinal or epidural anesthesia.   If you had laparoscopic surgery, to feel shoulder pain or discomfort on the day of surgery.   For some patients to have nausea after surgery/anesthesia    If you feel nausea or experience vomiting:   Try to move around less.   Eat less than usual or drink only liquids until the next morning   Nausea should resolve in about 24 hours    If you have  a problem when you are at home:    Call your surgeons office   Discharge Instructions: After Your Surgery  You’ve just had surgery. During surgery, you were given medicine called anesthesia to keep you relaxed and free of pain. After surgery, you may have some pain or nausea. This is common. Here are some tips for feeling better and getting well after surgery.   Going home  Your healthcare provider will show you how to take care of yourself when you go home. They'll also answer your questions. Have an adult family member or friend drive you home. For the first 24 hours after your surgery:   Don't drive or use heavy equipment.  Don't make important decisions or sign legal papers.  Take medicines as directed.  Don't drink alcohol.  Have someone stay with you, if needed. They can watch for problems and help keep you safe.  Be sure to go to all follow-up visits with your healthcare provider. And rest after your surgery for as long as your provider tells you to.   Coping with pain  If you have pain after surgery, pain medicine will help you feel better. Take it as directed, before pain becomes severe. Also, ask your healthcare provider or pharmacist about other ways to control pain. This might be with heat, ice, or relaxation. And follow any other instructions your surgeon or nurse gives you.      Stay on schedule with your medicine.     Tips for taking pain medicine  To get the best relief possible, remember these points:   Pain medicines can upset your stomach. Taking them with a little food may help.  Most pain relievers taken by mouth need at least 20 to 30 minutes to start to work.  Don't wait till your pain becomes severe before you take your medicine. Try to time your medicine so that you can take it before starting an activity. This might be before you get dressed, go for a walk, or sit down for dinner.  Constipation is a common side effect of some pain medicines. Call your healthcare provider before taking any  medicines such as laxatives or stool softeners to help ease constipation. Also ask if you should skip any foods. Drinking lots of fluids and eating foods such as fruits and vegetables that are high in fiber can also help. Remember, don't take laxatives unless your surgeon has prescribed them.  Drinking alcohol and taking pain medicine can cause dizziness and slow your breathing. It can even be deadly. Don't drink alcohol while taking pain medicine.  Pain medicine can make you react more slowly to things. Don't drive or run machinery while taking pain medicine.  Your healthcare provider may tell you to take acetaminophen to help ease your pain. Ask them how much you're supposed to take each day. Acetaminophen or other pain relievers may interact with your prescription medicines or other over-the-counter (OTC) medicines. Some prescription medicines have acetaminophen and other ingredients in them. Using both prescription and OTC acetaminophen for pain can cause you to accidentally overdose. Read the labels on your OTC medicines with care. This will help you to clearly know the list of ingredients, how much to take, and any warnings. It may also help you not take too much acetaminophen. If you have questions or don't understand the information, ask your pharmacist or healthcare provider to explain it to you before you take the OTC medicine.   Managing nausea  Some people have an upset stomach (nausea) after surgery. This is often because of anesthesia, pain, or pain medicine, less movement of food in the stomach, or the stress of surgery. These tips will help you handle nausea and eat healthy foods as you get better. If you were on a special food plan before surgery, ask your healthcare provider if you should follow it while you get better. Check with your provider on how your eating should progress. It may depend on the surgery you had. These general tips may help:   Don't push yourself to eat. Your body will tell you  when to eat and how much.  Start off with clear liquids and soup. They're easier to digest.  Next try semi-solid foods as you feel ready. These include mashed potatoes, applesauce, and gelatin.  Slowly move to solid foods. Don’t eat fatty, rich, or spicy foods at first.  Don't force yourself to have 3 large meals a day. Instead eat smaller amounts more often.  Take pain medicines with a small amount of solid food, such as crackers or toast. This helps prevent nausea.  When to call your healthcare provider  Call your healthcare provider right away if you have any of these:   You still have too much pain, or the pain gets worse, after taking the medicine. The medicine may not be strong enough. Or there may be a complication from the surgery.  You feel too sleepy, dizzy, or groggy. The medicine may be too strong.  Side effects such as nausea or vomiting. Your healthcare provider may advise taking other medicines to .  Skin changes such as rash, itching, or hives. This may mean you have an allergic reaction. Your provider may advise taking other medicines.  The incision looks different (for instance, part of it opens up).  Bleeding or fluid leaking from the incision site, and weren't told to expect that.  Fever of 100.4°F (38°C) or higher, or as directed by your provider.  Call 911  Call 911 right away if you have:   Trouble breathing  Facial swelling    If you have obstructive sleep apnea   You were given anesthesia medicine during surgery to keep you comfortable and free of pain. After surgery, you may have more apnea spells because of this medicine and other medicines you were given. The spells may last longer than normal.    At home:  Keep using the continuous positive airway pressure (CPAP) device when you sleep. Unless your healthcare provider tells you not to, use it when you sleep, day or night. CPAP is a common device used to treat obstructive sleep apnea.  Talk with your provider before taking any pain  medicine, muscle relaxants, or sedatives. Your provider will tell you about the possible dangers of taking these medicines.  Contact your provider if your sleeping changes a lot even when taking medicines as directed.  Jean-Claude last reviewed this educational content on 10/1/2021  © 2686-8924 [employer]. All rights reserved. This information is not intended as a substitute for professional medical care. Always follow your healthcare professional's instructions.

## 2025-03-06 ENCOUNTER — HOSPITAL ENCOUNTER (OUTPATIENT)
Facility: HOSPITAL | Age: 55
Setting detail: HOSPITAL OUTPATIENT SURGERY
Discharge: HOME | End: 2025-03-06
Attending: UROLOGY | Admitting: UROLOGY
Payer: COMMERCIAL

## 2025-03-06 ENCOUNTER — ANESTHESIA EVENT (OUTPATIENT)
Dept: SURGERY | Facility: HOSPITAL | Age: 55
End: 2025-03-06
Payer: COMMERCIAL

## 2025-03-06 ENCOUNTER — APPOINTMENT (OUTPATIENT)
Dept: GENERAL RADIOLOGY | Facility: HOSPITAL | Age: 55
End: 2025-03-06
Attending: UROLOGY
Payer: COMMERCIAL

## 2025-03-06 ENCOUNTER — ANESTHESIA (OUTPATIENT)
Dept: SURGERY | Facility: HOSPITAL | Age: 55
End: 2025-03-06
Payer: COMMERCIAL

## 2025-03-06 ENCOUNTER — TELEPHONE (OUTPATIENT)
Dept: SURGERY | Facility: CLINIC | Age: 55
End: 2025-03-06

## 2025-03-06 VITALS
TEMPERATURE: 97 F | DIASTOLIC BLOOD PRESSURE: 60 MMHG | RESPIRATION RATE: 16 BRPM | OXYGEN SATURATION: 95 % | HEIGHT: 68 IN | WEIGHT: 151 LBS | SYSTOLIC BLOOD PRESSURE: 101 MMHG | BODY MASS INDEX: 22.88 KG/M2 | HEART RATE: 73 BPM

## 2025-03-06 DIAGNOSIS — N20.0 NEPHROLITHIASIS: Primary | ICD-10-CM

## 2025-03-06 PROCEDURE — 0TC08ZZ EXTIRPATION OF MATTER FROM RIGHT KIDNEY, VIA NATURAL OR ARTIFICIAL OPENING ENDOSCOPIC: ICD-10-PCS | Performed by: UROLOGY

## 2025-03-06 PROCEDURE — 74420 UROGRAPHY RTRGR +-KUB: CPT | Performed by: UROLOGY

## 2025-03-06 PROCEDURE — 0T768DZ DILATION OF RIGHT URETER WITH INTRALUMINAL DEVICE, VIA NATURAL OR ARTIFICIAL OPENING ENDOSCOPIC: ICD-10-PCS | Performed by: UROLOGY

## 2025-03-06 PROCEDURE — 52356 CYSTO/URETERO W/LITHOTRIPSY: CPT | Performed by: UROLOGY

## 2025-03-06 PROCEDURE — BT1DZZZ FLUOROSCOPY OF RIGHT KIDNEY, URETER AND BLADDER: ICD-10-PCS | Performed by: UROLOGY

## 2025-03-06 DEVICE — URETERAL STENT
Type: IMPLANTABLE DEVICE | Site: URETER | Status: FUNCTIONAL
Brand: CONTOUR™

## 2025-03-06 RX ORDER — MIDAZOLAM HYDROCHLORIDE 1 MG/ML
INJECTION INTRAMUSCULAR; INTRAVENOUS AS NEEDED
Status: DISCONTINUED | OUTPATIENT
Start: 2025-03-06 | End: 2025-03-06 | Stop reason: SURG

## 2025-03-06 RX ORDER — ONDANSETRON 2 MG/ML
INJECTION INTRAMUSCULAR; INTRAVENOUS AS NEEDED
Status: DISCONTINUED | OUTPATIENT
Start: 2025-03-06 | End: 2025-03-06 | Stop reason: SURG

## 2025-03-06 RX ORDER — DEXAMETHASONE SODIUM PHOSPHATE 4 MG/ML
VIAL (ML) INJECTION AS NEEDED
Status: DISCONTINUED | OUTPATIENT
Start: 2025-03-06 | End: 2025-03-06 | Stop reason: SURG

## 2025-03-06 RX ORDER — HYDROMORPHONE HYDROCHLORIDE 1 MG/ML
0.4 INJECTION, SOLUTION INTRAMUSCULAR; INTRAVENOUS; SUBCUTANEOUS EVERY 5 MIN PRN
Status: DISCONTINUED | OUTPATIENT
Start: 2025-03-06 | End: 2025-03-06

## 2025-03-06 RX ORDER — NALOXONE HYDROCHLORIDE 0.4 MG/ML
0.08 INJECTION, SOLUTION INTRAMUSCULAR; INTRAVENOUS; SUBCUTANEOUS AS NEEDED
Status: DISCONTINUED | OUTPATIENT
Start: 2025-03-06 | End: 2025-03-06

## 2025-03-06 RX ORDER — PROCHLORPERAZINE EDISYLATE 5 MG/ML
5 INJECTION INTRAMUSCULAR; INTRAVENOUS EVERY 8 HOURS PRN
Status: DISCONTINUED | OUTPATIENT
Start: 2025-03-06 | End: 2025-03-06

## 2025-03-06 RX ORDER — SULFAMETHOXAZOLE AND TRIMETHOPRIM 800; 160 MG/1; MG/1
1 TABLET ORAL 2 TIMES DAILY
Qty: 6 TABLET | Refills: 0 | Status: SHIPPED | OUTPATIENT
Start: 2025-03-06 | End: 2025-03-09

## 2025-03-06 RX ORDER — ROCURONIUM BROMIDE 10 MG/ML
INJECTION, SOLUTION INTRAVENOUS AS NEEDED
Status: DISCONTINUED | OUTPATIENT
Start: 2025-03-06 | End: 2025-03-06 | Stop reason: SURG

## 2025-03-06 RX ORDER — PHENYLEPHRINE HCL 10 MG/ML
VIAL (ML) INJECTION AS NEEDED
Status: DISCONTINUED | OUTPATIENT
Start: 2025-03-06 | End: 2025-03-06 | Stop reason: SURG

## 2025-03-06 RX ORDER — HYDROMORPHONE HYDROCHLORIDE 1 MG/ML
0.6 INJECTION, SOLUTION INTRAMUSCULAR; INTRAVENOUS; SUBCUTANEOUS EVERY 5 MIN PRN
Status: DISCONTINUED | OUTPATIENT
Start: 2025-03-06 | End: 2025-03-06

## 2025-03-06 RX ORDER — ONDANSETRON 2 MG/ML
4 INJECTION INTRAMUSCULAR; INTRAVENOUS EVERY 6 HOURS PRN
Status: DISCONTINUED | OUTPATIENT
Start: 2025-03-06 | End: 2025-03-06

## 2025-03-06 RX ORDER — SODIUM CHLORIDE, SODIUM LACTATE, POTASSIUM CHLORIDE, CALCIUM CHLORIDE 600; 310; 30; 20 MG/100ML; MG/100ML; MG/100ML; MG/100ML
INJECTION, SOLUTION INTRAVENOUS CONTINUOUS
Status: DISCONTINUED | OUTPATIENT
Start: 2025-03-06 | End: 2025-03-06

## 2025-03-06 RX ORDER — KETOROLAC TROMETHAMINE 10 MG/1
10 TABLET, FILM COATED ORAL EVERY 6 HOURS PRN
Qty: 10 TABLET | Refills: 0 | Status: SHIPPED | OUTPATIENT
Start: 2025-03-06

## 2025-03-06 RX ORDER — KETOROLAC TROMETHAMINE 30 MG/ML
INJECTION, SOLUTION INTRAMUSCULAR; INTRAVENOUS AS NEEDED
Status: DISCONTINUED | OUTPATIENT
Start: 2025-03-06 | End: 2025-03-06 | Stop reason: SURG

## 2025-03-06 RX ORDER — HYDROMORPHONE HYDROCHLORIDE 1 MG/ML
0.2 INJECTION, SOLUTION INTRAMUSCULAR; INTRAVENOUS; SUBCUTANEOUS EVERY 5 MIN PRN
Status: DISCONTINUED | OUTPATIENT
Start: 2025-03-06 | End: 2025-03-06

## 2025-03-06 RX ORDER — TAMSULOSIN HYDROCHLORIDE 0.4 MG/1
0.4 CAPSULE ORAL DAILY
Qty: 30 CAPSULE | Refills: 0 | Status: SHIPPED | OUTPATIENT
Start: 2025-03-06 | End: 2025-04-05

## 2025-03-06 RX ORDER — GLYCOPYRROLATE 0.2 MG/ML
INJECTION, SOLUTION INTRAMUSCULAR; INTRAVENOUS AS NEEDED
Status: DISCONTINUED | OUTPATIENT
Start: 2025-03-06 | End: 2025-03-06 | Stop reason: SURG

## 2025-03-06 RX ORDER — ACETAMINOPHEN 500 MG
1000 TABLET ORAL ONCE
Status: COMPLETED | OUTPATIENT
Start: 2025-03-06 | End: 2025-03-06

## 2025-03-06 RX ORDER — LIDOCAINE HYDROCHLORIDE 10 MG/ML
INJECTION, SOLUTION EPIDURAL; INFILTRATION; INTRACAUDAL; PERINEURAL AS NEEDED
Status: DISCONTINUED | OUTPATIENT
Start: 2025-03-06 | End: 2025-03-06 | Stop reason: SURG

## 2025-03-06 RX ADMIN — GLYCOPYRROLATE 0.2 MG: 0.2 INJECTION, SOLUTION INTRAMUSCULAR; INTRAVENOUS at 07:33:00

## 2025-03-06 RX ADMIN — MIDAZOLAM HYDROCHLORIDE 2 MG: 1 INJECTION INTRAMUSCULAR; INTRAVENOUS at 07:32:00

## 2025-03-06 RX ADMIN — KETOROLAC TROMETHAMINE 30 MG: 30 INJECTION, SOLUTION INTRAMUSCULAR; INTRAVENOUS at 08:15:00

## 2025-03-06 RX ADMIN — ONDANSETRON 4 MG: 2 INJECTION INTRAMUSCULAR; INTRAVENOUS at 07:33:00

## 2025-03-06 RX ADMIN — DEXAMETHASONE SODIUM PHOSPHATE 8 MG: 4 MG/ML VIAL (ML) INJECTION at 07:33:00

## 2025-03-06 RX ADMIN — PHENYLEPHRINE HCL 200 MCG: 10 MG/ML VIAL (ML) INJECTION at 08:08:00

## 2025-03-06 RX ADMIN — PHENYLEPHRINE HCL 100 MCG: 10 MG/ML VIAL (ML) INJECTION at 08:06:00

## 2025-03-06 RX ADMIN — LIDOCAINE HYDROCHLORIDE 50 MG: 10 INJECTION, SOLUTION EPIDURAL; INFILTRATION; INTRACAUDAL; PERINEURAL at 07:35:00

## 2025-03-06 RX ADMIN — PHENYLEPHRINE HCL 100 MCG: 10 MG/ML VIAL (ML) INJECTION at 08:04:00

## 2025-03-06 RX ADMIN — ROCURONIUM BROMIDE 40 MG: 10 INJECTION, SOLUTION INTRAVENOUS at 07:35:00

## 2025-03-06 NOTE — ANESTHESIA PREPROCEDURE EVALUATION
Anesthesia PreOp Note    HPI:     Felicita Jara is a 54 year old female who presents for preoperative consultation requested by: Kennedi Clay MD    Date of Surgery: 3/6/2025    Procedure(s):  Cystoscopy right ureteroscopy, laser lithotripsy, retrograde pyelogram, stent placement  CYSTOSCOPY WITH HOLMIUM LASER  CYSTOSCOPY RETROGRADE  CYSTOSCOPY STENT INSERTION  Indication: Nephrolithiasis [N20.0]    Relevant Problems   No relevant active problems       NPO:  Last Liquid Consumption Date: 03/05/25  Last Liquid Consumption Time: 1900  Last Solid Consumption Date: 03/05/25  Last Solid Consumption Time: 1900  Last Liquid Consumption Date: 03/05/25          History Review:  Patient Active Problem List    Diagnosis Date Noted    Intramural leiomyoma of uterus 10/26/2024    Gross hematuria 10/26/2024    Migraine with aura and without status migrainosus, not intractable 10/26/2024    Osteopenia of neck of femur 02/15/2024    Adhesive capsulitis of left shoulder 01/23/2024    Thyroid nodule 06/27/2023    Vitamin D deficiency 06/27/2023    Memory loss 03/02/2023    Mixed hyperlipidemia 03/02/2023    Anxiety and depression 05/24/2022    Chronic pain of both knees 05/24/2022    Acute dysfunction of left eustachian tube 05/24/2022    History of adenomatous polyp of colon 12/10/2021    Benign neoplasm of transverse colon 12/10/2021    Benign neoplasm of sigmoid colon 12/10/2021    Recurrent cold sores 04/05/2021    Restless leg syndrome 02/01/2021    Short-term memory loss 02/01/2021    History of alcohol abuse 02/01/2021    Snoring 10/02/2020    Bilateral carpal tunnel syndrome 07/25/2018    Hand arthritis 07/25/2018    BMI 25.0-25.9,adult 02/27/2017       Past Medical History:    Abdominal distention    Alcoholism (HCC)    Allergic rhinitis    Sinus headaches    Anxiety    Arthritis    Bloating    Calculus of kidney    Colon polyp    Decorative tattoo    Depression    Diarrhea, unspecified    Essential hypertension     Hemorrhoids    High cholesterol    Hx of motion sickness    IBS (irritable bowel syndrome)    Irregular bowel habits    Migraines    Nausea    Obesity    Im working on it    Osteoarthritis    Stress    Trigger finger    Visual impairment    glasses    Weight gain       Past Surgical History:   Procedure Laterality Date    Colonoscopy  2014    Colonoscopy  Tummy problems    Getting better    Hand/finger surgery unlisted      Other surgical history  Finger-pinky. Lipoma removed       Prescriptions Prior to Admission[1]  Current Medications and Prescriptions Ordered in Epic[2]    Allergies[3]    Family History   Adopted: Yes   Problem Relation Age of Onset    Thyroid Disorder Mother     Thyroid disease Mother     Other (gallbladder removal) Mother     Other (TIA) Mother     Thyroid Disorder Maternal Grandmother     Brain Cancer Maternal Grandmother     Breast Cancer Maternal Grandmother     Other (smoker) Maternal Grandmother     Heart Disease Maternal Grandfather     Brain Cancer Maternal Aunt     Cancer Maternal Aunt         lung    Other (smoker) Maternal Aunt     Cancer Maternal Aunt     No Known Problems Maternal Aunt      Social History     Socioeconomic History    Marital status:    Tobacco Use    Smoking status: Former     Current packs/day: 0.00     Average packs/day: 0.3 packs/day for 15.0 years (3.8 ttl pk-yrs)     Types: Cigarettes     Start date: 2002     Quit date: 2017     Years since quittin.6    Smokeless tobacco: Never   Vaping Use    Vaping status: Former    Substances: Nicotine, THC   Substance and Sexual Activity    Alcohol use: Not Currently     Alcohol/week: 0.0 standard drinks of alcohol     Comment: Quit 2019    Drug use: Not Currently     Types: Cannabis   Other Topics Concern    Caffeine Concern Yes     Comment: Trying to quit drinking diet coke    Stress Concern No    Weight Concern Yes     Comment: Hard time losing weight    Special Diet Yes      Comment: Not doing well on Mediterranean diet    Exercise No    Seat Belt No       Available pre-op labs reviewed.  Lab Results   Component Value Date    WBC 8.7 02/24/2025    RBC 4.20 02/24/2025    HGB 12.7 02/24/2025    HCT 38.6 02/24/2025    MCV 91.9 02/24/2025    MCH 30.2 02/24/2025    MCHC 32.9 02/24/2025    RDW 11.9 02/24/2025    .0 02/24/2025     Lab Results   Component Value Date     02/24/2025    K 4.1 02/24/2025     02/24/2025    CO2 29.0 02/24/2025    BUN 12 02/24/2025    CREATSERUM 0.91 02/24/2025    GLU 77 02/24/2025    CA 9.6 02/24/2025     Lab Results   Component Value Date    INR 0.92 02/24/2025       Vital Signs:  Body mass index is 22.96 kg/m².   height is 1.727 m (5' 8\") and weight is 68.5 kg (151 lb). Her oral temperature is 98.3 °F (36.8 °C). Her blood pressure is 94/64 and her pulse is 71. Her respiration is 16 and oxygen saturation is 98%.   Vitals:    02/28/25 1415 03/06/25 0631 03/06/25 0651   BP:   94/64   Pulse:   71   Resp:   16   Temp:   98.3 °F (36.8 °C)   TempSrc:   Oral   SpO2:   98%   Weight: 70.3 kg (155 lb) 68.5 kg (151 lb)    Height: 1.727 m (5' 8\")          Anesthesia Evaluation     Patient summary reviewed and Nursing notes reviewed    Airway   Mallampati: II  TM distance: >3 FB  Neck ROM: full  Dental - Dentition appears grossly intact     Pulmonary - normal exam   Cardiovascular - normal exam  (-) hypertension    Neuro/Psych    (+)  anxiety/panic attacks,  depression      GI/Hepatic/Renal      Comments: irritable bowel syndrome, kidney stone  Former alcohol user    Endo/Other      Comments: obesity, nausea,   Abdominal                  Anesthesia Plan:   ASA:  2  Plan:   General  Airway:  ETT  Informed Consent Plan and Risks Discussed With:  Patient      I have informed Felicita Jara and/or legal guardian or family member of the nature of the anesthetic plan, benefits, risks including possible dental damage if relevant, major complications, and any  alternative forms of anesthetic management.   All of the patient's questions were answered to the best of my ability. The patient desires the anesthetic management as planned.  Ozzie Esposito MD  3/6/2025 7:05 AM  Present on Admission:  **None**           [1]   Medications Prior to Admission   Medication Sig Dispense Refill Last Dose/Taking    ondansetron 4 MG Oral Tablet Dispersible Take 1 tablet (4 mg total) by mouth every 8 (eight) hours as needed for Nausea.   3/5/2025 at 11:00 PM    rosuvastatin 20 MG Oral Tab Take 1 tablet (20 mg total) by mouth nightly. 30 tablet 0 3/5/2025 at  7:00 PM    buPROPion  MG Oral Tablet 24 Hr Take 1 tablet (300 mg total) by mouth daily. 90 tablet 1 3/5/2025 at  8:00 AM    escitalopram 20 MG Oral Tab Take 1 tablet (20 mg total) by mouth daily. 90 tablet 1 3/5/2025 at  8:00 AM    rOPINIRole 2 MG Oral Tab Take 1 tablet (2 mg total) by mouth nightly. 90 tablet 1 3/5/2025 at  7:00 PM    topiramate 100 MG Oral Tab Take 1 tablet (100 mg total) by mouth daily. 90 tablet 1 3/5/2025 at  8:00 AM    aspirin 81 MG Oral Tab EC Take 1 tablet (81 mg total) by mouth daily. For foot fracture 90 tablet  2/26/2025    Calcium Carbonate-Vit D-Min (CALCIUM-VITAMIN D-MINERALS) 600-800 MG-UNIT Oral Chew Tab Chew 1 tablet by mouth in the morning and 1 tablet before bedtime. 180 tablet 3 Past Week    valACYclovir 1 G Oral Tab Take 2 tablets (2,000 mg total) by mouth every 12 (twelve) hours. (Patient taking differently: Take 2 tablets (2,000 mg total) by mouth every 12 (twelve) hours. As needed) 4 tablet 3 Taking Differently   [2]   Current Facility-Administered Medications Ordered in Epic   Medication Dose Route Frequency Provider Last Rate Last Admin    lactated ringers infusion   Intravenous Continuous Kennedi Clay MD 20 mL/hr at 03/06/25 0653 New Bag at 03/06/25 0653    ceFAZolin (Ancef) 2g in 10mL IV syringe premix  2 g Intravenous Once Kennedi Clay MD         No current [facility]-ordered  outpatient medications on file.   [3]   Allergies  Allergen Reactions    Niacin FACE FLUSHING    Gluten Meal DIARRHEA     Gluten sensitivity

## 2025-03-06 NOTE — ANESTHESIA PROCEDURE NOTES
Airway  Date/Time: 3/6/2025 7:37 AM  Urgency: Elective    Airway not difficult    General Information and Staff    Patient location during procedure: OR  Anesthesiologist: Ozzie Esposito MD  Performed: anesthesiologist   Performed by: Ozzie Esposito MD  Authorized by: Ozzie Esposito MD      Indications and Patient Condition  Indications for airway management: anesthesia  Sedation level: deep  Preoxygenated: yes  Patient position: sniffing  Mask difficulty assessment: 1 - vent by mask    Final Airway Details  Final airway type: endotracheal airway      Successful airway: ETT  Cuffed: yes   Successful intubation technique: Video laryngoscopy  Endotracheal tube insertion site: oral  Blade type: Weaver.    Cormack-Lehane Classification: grade I - full view of glottis  Placement verified by: capnometry   Cuff volume (mL): 8  Measured from: teeth  ETT to teeth (cm): 20  Number of attempts at approach: 1

## 2025-03-06 NOTE — ANESTHESIA POSTPROCEDURE EVALUATION
Patient: Felicita Jara    Procedure Summary       Date: 03/06/25 Room / Location: [facility] MAIN OR  / [facility] MAIN OR    Anesthesia Start: 0731 Anesthesia Stop: 0829    Procedures:       Cystoscopy right ureteroscopy, laser lithotripsy, retrograde pyelogram, stent placement (Right)      CYSTOSCOPY WITH HOLMIUM LASER (Right)      CYSTOSCOPY RETROGRADE (Right)      CYSTOSCOPY STENT INSERTION (Right) Diagnosis:       Nephrolithiasis      (Nephrolithiasis [N20.0])    Surgeons: Kennedi Clay MD Anesthesiologist: Ozzie Esposito MD    Anesthesia Type: general ASA Status: 2            Anesthesia Type: general    Vitals Value Taken Time   /65 03/06/25 0829   Temp 97.5 03/06/25 0829   Pulse 86 03/06/25 0829   Resp 17 03/06/25 0829   SpO2 93 % 03/06/25 0829   Vitals shown include unfiled device data.    [facility] AN Post Evaluation:   Patient Evaluated in PACU  Patient Participation: complete - patient participated  Level of Consciousness: awake  Airway Patency:patent  Dental exam unchanged from preop  Yes    Nausea/Vomiting: none  Cardiovascular Status: acceptable  Respiratory Status: acceptable  Postoperative Hydration acceptable      Ozzie Esposito MD  3/6/2025 8:29 AM

## 2025-03-06 NOTE — OPERATIVE REPORT
OPERATIVE REPORT  DATE OF SURGERY: 3/6/2025  PREOPERATIVE DIAGNOSIS: right kidney stone  POSTOPERATIVE DIAGNOSIS: same  PROCEDURE: Cystoscopy, right Ureteroscopy, laser lithotripsy, right retrograde pyelogram, placement of 6 x 26cm double j stent - ON A STRING  SURGEON: Kennedi Clay MD  ASSISTANT: none  ANESTHESIA: general  FLUIDS: per anesthesia  URINE OUTPUT: n/a  ESTIMATED BLOOD LOSS: 3cc  SPECIMENS: right kidney stone  CONDITION: Stable to PACU    INDICATIONS: right kidney stone  PROCEDURE: The patient was met in the preoperative holding area. Appropriate informed consent was obtained and the patient was brought to the operative suite. ?Appropriate timeout was performed per hospital protocol. After the successful induction of general anesthesia, the patient was placed in the dorsal lithotomy position. ?Pressure points were meticulously padded. The patient was prepped and draped in a standard sterile fashion and IV ancef was given as preoperative prophylaxis. At this point, we passed a 22-[place] cystoscope per urethra into the bladder. ?We identified the right ureteric orifice and used fluoroscopic guidance to cannulate this with a .038 Sensor guidewire to the level of the kidney. ?A dual lumen catheter was then passed into the proximal ureter and a second wire was passed through the dual lumen catheter and the dual lumen catheter was removed. We kept one wire as a safety wire and an over our working wire, we passed a 12/14-[place] ureteral access sheath keeping one wire behind as a safety wire. We then passed a ureteroscope through the access sheath up into the kidney. She did have a partially duplicated system. The stone was seen in the lower system and was big in size. We brought in the 200 nanometer Track tip laser fiber on variable settings we ablated into smaller pieces. The tipless nitinol basket was used to completely extract all of the significant fragments and then we confirmed this with  ureteroscopy again. She did have some preethi's plaques suggesting new stones formation. We brought the laser back in and dusted a few more tiny fragments with the laser, but then there was nothing significant remaining. ?At this point, the laser fiber was removed and the basket was removed. ?The access sheath was removed while scoping the entire ureter without evidence of any trauma or stone. A retrograde pyelogram showed delicate calyces and a partially duplicated system versus long infundibulum.  ?Using the safety wire and fluoroscopic guidance, we passed a 6-Bulgarian x 26 cm stent with a nice coil in the right kidney and a nice coil in the bladder, the string was left. We secured the string to the mons with Mastisol, steri strips and Tegaderm. ?The procedure was then complete. Patient was awoken from anesthesia and taken to the recovery room in stable condition. All counts were correct x2.    IMPRESSION: s/p right urs all stones treated - stent on string     PLAN:  Stent x 7 days  3d abx  Tamsulosin, toradol, 3d abx

## 2025-03-06 NOTE — OR PREOP
Anesthesia aware patient had shingles and pneumonia vaccine on Friday. After developed chills, fever, body aches for 2 days then has been fine ever since. No fever today. Okay to proceed with surgery per anesthesia.

## 2025-03-10 ENCOUNTER — TELEPHONE (OUTPATIENT)
Dept: SURGERY | Facility: CLINIC | Age: 55
End: 2025-03-10

## 2025-03-10 NOTE — TELEPHONE ENCOUNTER
I s/w pt and told her that unfortunately the stent can cause the blood in her urine and it can be all the time or intermittently and she could also experience flank pain, abdominal pain, frequency and urgency and dysuria also. She denies fever and chills or N/V.

## 2025-03-10 NOTE — TELEPHONE ENCOUNTER
I s/w pt and told her that unfortunately the symptoms she is experiencing are typical d/t the stent she has in place. I determined that she does not have fever/chills or N/V. I told her to drink plenty of fluids and call if worsening symptoms.       DATE OF SURGERY: 3/6/2025  PREOPERATIVE DIAGNOSIS: right kidney stone  POSTOPERATIVE DIAGNOSIS: same  PROCEDURE: Cystoscopy, right Ureteroscopy, laser lithotripsy, right retrograde pyelogram, placement of 6 x 26cm double j stent - ON A STRING  SURGEON: Kennedi Clay MD

## 2025-03-10 NOTE — TELEPHONE ENCOUNTER
Patient stating she had surgery last Thursday with Dr Clay  and she has been urinating blood and is dark color.Patient would like to know if is normal.Please advise

## 2025-03-13 ENCOUNTER — TELEPHONE (OUTPATIENT)
Dept: SURGERY | Facility: CLINIC | Age: 55
End: 2025-03-13

## 2025-03-13 NOTE — TELEPHONE ENCOUNTER
I s/w pt and determined that she was having acute pain in her Rt. kidney after removing the stent. I explained that this can happen as I have heard AR tell pt's this. I determined that she had no fever/chills, hematuria, dysuria or difficulty urinating. She took 1 tab on Ketoralac and felt better. I told her she can take the Tylenol 2 tabs between doses of Ketorolac and she can try a heating pad to the area for 20 min every hr and push plenty of fluids. I told her that I will also let AR know. Pt verbalized understanding and was thankful for the call.

## 2025-03-13 NOTE — TELEPHONE ENCOUNTER
Patient remove stent today morning and patient is having acute pain where her kidney is located.Please advise

## 2025-03-15 LAB
CAOX DIHYDRATE: 60 %
CAOX MONOHYDRATE: 10 %
HYDROXYAPATITE: 30 %
WEIGHT-STONE: 22 MG

## 2025-03-17 NOTE — TELEPHONE ENCOUNTER
Kennedi Clay MD Ohalloran, Nina, RN  Caller: Unspecified (4 days ago, 11:55 AM)  Yes pain can be normal. Hydration, tamsulosin, ibuprofen/toradol can help. If fevers -->ER

## 2025-03-24 ENCOUNTER — MED REC SCAN ONLY (OUTPATIENT)
Dept: FAMILY MEDICINE CLINIC | Facility: CLINIC | Age: 55
End: 2025-03-24

## 2025-04-19 DIAGNOSIS — G25.81 RESTLESS LEG SYNDROME: ICD-10-CM

## 2025-04-23 RX ORDER — ROPINIROLE 2 MG/1
2 TABLET, FILM COATED ORAL NIGHTLY
Qty: 30 TABLET | Refills: 0 | Status: SHIPPED | OUTPATIENT
Start: 2025-04-23

## 2025-04-23 NOTE — TELEPHONE ENCOUNTER
Refill(s) Requested:   Requested Prescriptions     Pending Prescriptions Disp Refills    rOPINIRole 2 MG Oral Tab [Pharmacy Med Name: ROPINIROLE 2MG TABLETS] 90 tablet 0     Sig: TAKE 1 TABLET(2 MG) BY MOUTH EVERY NIGHT     Medication Last Prescribed:  10/26/2024 90 days 1 refill     Has dose or medication been changed    since last prescription? *Review notes*    []  Yes       [x]  No     Last office visit: 10/26/2024 (in-office)  Visit date not found (virtual visit)     Relevant details from LOV note: Restless leg syndrome  -Increase rOPINIRole 2 MG Oral Tab; Take 1 tablet (2 mg total) by mouth nightly.  Dispense: 90 tablet; Refill: 1  Denies medication side effects  Recheck in 4 to 6 weeks-May be virtual visit     Relevant lab results: N/A    Patient was asked to follow-up in: Return in about 5 months (around 3/26/2025) for annual physical, fasting labs AM, also follow-up 1 month virtual visit restless leg.     Appointment due: March 2025    Future Appointments: Visit date not found     Action taken:  [x] Medication refilled per protocol

## 2025-05-15 ENCOUNTER — PATIENT MESSAGE (OUTPATIENT)
Dept: SURGERY | Facility: CLINIC | Age: 55
End: 2025-05-15

## 2025-05-15 ENCOUNTER — PATIENT MESSAGE (OUTPATIENT)
Dept: FAMILY MEDICINE CLINIC | Facility: CLINIC | Age: 55
End: 2025-05-15

## 2025-05-15 DIAGNOSIS — G43.109 MIGRAINE WITH AURA AND WITHOUT STATUS MIGRAINOSUS, NOT INTRACTABLE: ICD-10-CM

## 2025-05-15 NOTE — TELEPHONE ENCOUNTER
LOV 10/26/24    Pt. takes calcium carbonate 600-800 mg daily.     Pt. sent my chart message stating that she previously had a large “calcium” kidney stone surgically taken out and I’m wondering if the two are connected?     Pt. wants to know if she is consuming too much calcium for her osteopenia?     Should pt. come into the office to discuss? Pt. was asked to follow up around 3/26/2025 for annual physical.    Please advise!

## 2025-05-19 RX ORDER — TOPIRAMATE 100 MG/1
100 TABLET, FILM COATED ORAL DAILY
Qty: 30 TABLET | Refills: 0 | Status: SHIPPED | OUTPATIENT
Start: 2025-05-19

## 2025-05-19 NOTE — TELEPHONE ENCOUNTER
Refill Request Action taken:  [x] Request routed to provider for review - [ ] Due for appointment- no future appointment scheduled    Refill(s) Requested:   Requested Prescriptions     Pending Prescriptions Disp Refills    topiramate 100 MG Oral Tab [Pharmacy Med Name: TOPIRAMATE 100MG TABLETS] 90 tablet 0     Sig: TAKE 1 TABLET(100 MG) BY MOUTH DAILY     Medication Last Prescribed:  10/26/2024 90 days 1 refill    / Has dose or medication been changed    since last prescription? *Review notes*    []  Yes       [x]  No      Last office visit: 10/26/2024 (in-office)  Visit date not found (virtual visit)     Relevant details from LOV note: Migraine with aura and without status migrainosus, not intractable  - topiramate 100 MG Oral Tab; Take 1 tablet (100 mg total) by mouth daily.  Dispense: 90 tablet; Refill: 1  - Comp Metabolic Panel (14)  Controlled   Continue topiramate     Relevant lab results: N/A    Patient was asked to follow-up in: Return in about 5 months (around 3/26/2025) for annual physical, fasting labs AM, also follow-up 1 month virtual visit restless leg.     Appointment due: March 2025    Future Appointments: Visit date not found     Statement Selected

## 2025-05-20 NOTE — TELEPHONE ENCOUNTER
Kidney stones are typically precipitated by dehydration.  Patient may eat 3 calcium rich servings such as low-fat yogurt or low-fat milk or Or other calcium rich food as a natural source of calcium.  Patient can just take vitamin D3 800 to 1000 units after dinner daily and hold on the calcium.  She may also ask the opinion of her urologist.    Thank you

## 2025-06-06 ENCOUNTER — TELEPHONE (OUTPATIENT)
Dept: FAMILY MEDICINE CLINIC | Facility: CLINIC | Age: 55
End: 2025-06-06

## 2025-06-06 DIAGNOSIS — G25.81 RESTLESS LEG SYNDROME: ICD-10-CM

## 2025-06-06 DIAGNOSIS — E78.2 MIXED HYPERLIPIDEMIA: ICD-10-CM

## 2025-06-09 DIAGNOSIS — G25.81 RESTLESS LEG SYNDROME: ICD-10-CM

## 2025-06-09 DIAGNOSIS — E78.2 MIXED HYPERLIPIDEMIA: ICD-10-CM

## 2025-06-09 RX ORDER — ROPINIROLE 2 MG/1
2 TABLET, FILM COATED ORAL NIGHTLY
Qty: 30 TABLET | Refills: 0 | Status: SHIPPED | OUTPATIENT
Start: 2025-06-09

## 2025-06-09 RX ORDER — ROSUVASTATIN CALCIUM 20 MG/1
20 TABLET, COATED ORAL NIGHTLY
Qty: 30 TABLET | Refills: 0 | Status: SHIPPED | OUTPATIENT
Start: 2025-06-09

## 2025-06-09 NOTE — TELEPHONE ENCOUNTER
Patient : please call patient to assist with scheduling appointment with Primary Care Provider  From 10/26/24 : \"Return in about 5 months (around 3/26/2025) for annual physical, also follow-up 1 month virtual visit restless leg.\"      Protocol failed for appointment only  30 day refill given on 06/09/25, appointment needed for further refills.    Requested Prescriptions   Pending Prescriptions Disp Refills    rosuvastatin 20 MG Oral Tab 30 tablet 0     Sig: Take 1 tablet (20 mg total) by mouth nightly.  **Appointment needed for further refills.        Cholesterol Medication Protocol Passed - 6/9/2025  3:26 PM       rOPINIRole 2 MG Oral Tab 30 tablet 0     Sig: Take 1 tablet (2 mg total) by mouth nightly.   **Appointment needed for further refills.        Neurology Medications Failed - 6/9/2025  3:26 PM        Failed - In person appointment or virtual visit in the past 6 mos or appointment in next 3 mos        Passed - Medication is active on med list

## 2025-06-11 RX ORDER — ROSUVASTATIN CALCIUM 20 MG/1
20 TABLET, COATED ORAL NIGHTLY
Qty: 90 TABLET | Refills: 0 | OUTPATIENT
Start: 2025-06-11

## 2025-06-11 RX ORDER — ROPINIROLE 2 MG/1
2 TABLET, FILM COATED ORAL NIGHTLY
Qty: 90 TABLET | Refills: 0 | OUTPATIENT
Start: 2025-06-11

## 2025-06-11 NOTE — TELEPHONE ENCOUNTER
rosuvastatin 20 MG Oral Tab 30 tablet 0 6/9/2025 --    Sig - Route: Take 1 tablet (20 mg total) by mouth nightly. **Appointment needed for further refills. - Oral    Sent to pharmacy as: Rosuvastatin Calcium 20 MG Oral Tablet (Crestor)    Notes to Pharmacy: 06/09/25, **Appointment needed for further refills.    E-Prescribing Status: Receipt confirmed by pharmacy (6/9/2025  3:29 PM CDT)      Associated Diagnoses    Mixed hyperlipidemia        Pharmacy    [id number] - [place] - [address] AT [address], [phone], [phone]      Disp Refills Start End    rOPINIRole 2 MG Oral Tab 30 tablet 0 6/9/2025 --    Sig - Route: Take 1 tablet (2 mg total) by mouth nightly. **Appointment needed for further refills. - Oral    Sent to pharmacy as: rOPINIRole HCl 2 MG Oral Tablet (Requip)    Notes to Pharmacy: 06/09/25, **Appointment needed for further refills.    E-Prescribing Status: Receipt confirmed by pharmacy (6/9/2025  3:29 PM CDT)      Associated Diagnoses    Restless leg syndrome        Pharmacy    [id number] - [place] - [address] AT [address], [phone], [phone]

## 2025-07-07 DIAGNOSIS — F32.A ANXIETY AND DEPRESSION: ICD-10-CM

## 2025-07-07 DIAGNOSIS — F41.9 ANXIETY AND DEPRESSION: ICD-10-CM

## 2025-07-07 RX ORDER — ESCITALOPRAM OXALATE 20 MG/1
20 TABLET ORAL DAILY
Qty: 90 TABLET | Refills: 0 | Status: SHIPPED | OUTPATIENT
Start: 2025-07-07

## 2025-07-07 RX ORDER — BUPROPION HYDROCHLORIDE 300 MG/1
300 TABLET ORAL DAILY
Qty: 90 TABLET | Refills: 0 | Status: SHIPPED | OUTPATIENT
Start: 2025-07-07

## 2025-07-07 NOTE — TELEPHONE ENCOUNTER
Please review; protocol failed/ has no protocol    Felicita STR[facility] (supporting Jael Galindo DO)1 hour ago (11:19 AM)       Refills have been requested for the following medications:         escitalopram 20 MG Oral Tab [Jael Galindo]      Patient Comment: I am conpletely out. Please send in asap. Thabk you so much.     Please see message below for upcoming appointment.    Future Appointments   Date Time Provider Department Center   7/17/2025  1:30 PM Jael Galindo DO [facility] [place]

## 2025-07-07 NOTE — TELEPHONE ENCOUNTER
Please review; protocol failed/ has no protocol    Felicita STRICKLAND [facility] (supporting Jael Galindo DO)1 hour ago (11:18 AM)       Refills have been requested for the following medications:         buPROPion  MG Oral Tablet 24 Hr [Jael Galindo]      Patient Comment: I am completely out. Please send asap.    Please see message below for upcoming appointment.    Future Appointments   Date Time Provider Department Center   7/17/2025  1:30 PM Jael Galindo DO [facility] [place]

## 2025-07-17 ENCOUNTER — OFFICE VISIT (OUTPATIENT)
Dept: FAMILY MEDICINE CLINIC | Facility: CLINIC | Age: 55
End: 2025-07-17
Payer: COMMERCIAL

## 2025-07-17 VITALS
RESPIRATION RATE: 18 BRPM | TEMPERATURE: 97 F | DIASTOLIC BLOOD PRESSURE: 82 MMHG | OXYGEN SATURATION: 97 % | HEIGHT: 68 IN | HEART RATE: 73 BPM | SYSTOLIC BLOOD PRESSURE: 126 MMHG | WEIGHT: 149 LBS | BODY MASS INDEX: 22.58 KG/M2

## 2025-07-17 DIAGNOSIS — B00.1 RECURRENT COLD SORES: ICD-10-CM

## 2025-07-17 DIAGNOSIS — M85.859 OSTEOPENIA OF NECK OF FEMUR, UNSPECIFIED LATERALITY: ICD-10-CM

## 2025-07-17 DIAGNOSIS — Z13.21 SCREENING FOR ENDOCRINE, NUTRITIONAL, METABOLIC AND IMMUNITY DISORDER: ICD-10-CM

## 2025-07-17 DIAGNOSIS — Z12.31 ENCOUNTER FOR SCREENING MAMMOGRAM FOR MALIGNANT NEOPLASM OF BREAST: ICD-10-CM

## 2025-07-17 DIAGNOSIS — T88.7XXA SIDE EFFECT OF MEDICATION: ICD-10-CM

## 2025-07-17 DIAGNOSIS — Z13.228 SCREENING FOR ENDOCRINE, NUTRITIONAL, METABOLIC AND IMMUNITY DISORDER: ICD-10-CM

## 2025-07-17 DIAGNOSIS — Z00.00 ANNUAL PHYSICAL EXAM: Primary | ICD-10-CM

## 2025-07-17 DIAGNOSIS — F41.9 ANXIETY AND DEPRESSION: ICD-10-CM

## 2025-07-17 DIAGNOSIS — F32.A ANXIETY AND DEPRESSION: ICD-10-CM

## 2025-07-17 DIAGNOSIS — Z13.0 SCREENING FOR ENDOCRINE, NUTRITIONAL, METABOLIC AND IMMUNITY DISORDER: ICD-10-CM

## 2025-07-17 DIAGNOSIS — G25.81 RESTLESS LEG SYNDROME: ICD-10-CM

## 2025-07-17 DIAGNOSIS — Z13.29 SCREENING FOR ENDOCRINE, NUTRITIONAL, METABOLIC AND IMMUNITY DISORDER: ICD-10-CM

## 2025-07-17 DIAGNOSIS — R41.3 MEMORY LOSS: ICD-10-CM

## 2025-07-17 DIAGNOSIS — N20.0 NEPHROLITHIASIS: ICD-10-CM

## 2025-07-17 DIAGNOSIS — E78.2 MIXED HYPERLIPIDEMIA: ICD-10-CM

## 2025-07-17 PROBLEM — E04.1 THYROID NODULE: Status: RESOLVED | Noted: 2023-06-27 | Resolved: 2025-07-17

## 2025-07-17 PROBLEM — R31.0 GROSS HEMATURIA: Status: RESOLVED | Noted: 2024-10-26 | Resolved: 2025-07-17

## 2025-07-17 LAB
BASOPHILS # BLD AUTO: 0.05 X10(3) UL (ref 0–0.2)
BASOPHILS NFR BLD AUTO: 0.6 %
EOSINOPHIL # BLD AUTO: 0.13 X10(3) UL (ref 0–0.7)
EOSINOPHIL NFR BLD AUTO: 1.6 %
ERYTHROCYTE [DISTWIDTH] IN BLOOD BY AUTOMATED COUNT: 12.9 %
HCT VFR BLD AUTO: 39.9 % (ref 35–48)
HGB BLD-MCNC: 13.3 G/DL (ref 12–16)
IMM GRANULOCYTES # BLD AUTO: 0.02 X10(3) UL (ref 0–1)
IMM GRANULOCYTES NFR BLD: 0.2 %
LYMPHOCYTES # BLD AUTO: 2.47 X10(3) UL (ref 1–4)
LYMPHOCYTES NFR BLD AUTO: 29.5 %
MCH RBC QN AUTO: 29.9 PG (ref 26–34)
MCHC RBC AUTO-ENTMCNC: 33.3 G/DL (ref 31–37)
MCV RBC AUTO: 89.7 FL (ref 80–100)
MONOCYTES # BLD AUTO: 0.53 X10(3) UL (ref 0.1–1)
MONOCYTES NFR BLD AUTO: 6.3 %
NEUTROPHILS # BLD AUTO: 5.16 X10 (3) UL (ref 1.5–7.7)
NEUTROPHILS # BLD AUTO: 5.16 X10(3) UL (ref 1.5–7.7)
NEUTROPHILS NFR BLD AUTO: 61.8 %
PLATELET # BLD AUTO: 269 10(3)UL (ref 150–450)
RBC # BLD AUTO: 4.45 X10(6)UL (ref 3.8–5.3)
WBC # BLD AUTO: 8.4 X10(3) UL (ref 4–11)

## 2025-07-17 PROCEDURE — 3079F DIAST BP 80-89 MM HG: CPT | Performed by: FAMILY MEDICINE

## 2025-07-17 PROCEDURE — 99214 OFFICE O/P EST MOD 30 MIN: CPT | Performed by: FAMILY MEDICINE

## 2025-07-17 PROCEDURE — 99396 PREV VISIT EST AGE 40-64: CPT | Performed by: FAMILY MEDICINE

## 2025-07-17 PROCEDURE — G2211 COMPLEX E/M VISIT ADD ON: HCPCS | Performed by: FAMILY MEDICINE

## 2025-07-17 PROCEDURE — 3074F SYST BP LT 130 MM HG: CPT | Performed by: FAMILY MEDICINE

## 2025-07-17 PROCEDURE — 3008F BODY MASS INDEX DOCD: CPT | Performed by: FAMILY MEDICINE

## 2025-07-17 PROCEDURE — 36415 COLL VENOUS BLD VENIPUNCTURE: CPT | Performed by: FAMILY MEDICINE

## 2025-07-17 PROCEDURE — 80061 LIPID PANEL: CPT | Performed by: FAMILY MEDICINE

## 2025-07-17 PROCEDURE — 80050 GENERAL HEALTH PANEL: CPT | Performed by: FAMILY MEDICINE

## 2025-07-17 PROCEDURE — 99499 UNLISTED E&M SERVICE: CPT | Performed by: FAMILY MEDICINE

## 2025-07-17 RX ORDER — VALACYCLOVIR HYDROCHLORIDE 1 G/1
2000 TABLET, FILM COATED ORAL EVERY 12 HOURS SCHEDULED
Qty: 4 TABLET | Refills: 5 | Status: SHIPPED | OUTPATIENT
Start: 2025-07-17

## 2025-07-17 RX ORDER — ROPINIROLE 0.5 MG/1
TABLET, FILM COATED ORAL
Qty: 85 TABLET | Refills: 0 | Status: SHIPPED | OUTPATIENT
Start: 2025-07-17 | End: 2025-08-16

## 2025-07-17 RX ORDER — AMOXICILLIN 500 MG/1
500 CAPSULE ORAL EVERY 8 HOURS
COMMUNITY
Start: 2025-06-09

## 2025-07-17 RX ORDER — DEXAMETHASONE 2 MG/1
TABLET ORAL
COMMUNITY
Start: 2025-06-09

## 2025-07-17 RX ORDER — ONDANSETRON 8 MG/1
8 TABLET, ORALLY DISINTEGRATING ORAL EVERY 8 HOURS PRN
Qty: 10 TABLET | Refills: 0 | Status: SHIPPED | OUTPATIENT
Start: 2025-07-17

## 2025-07-17 RX ORDER — IBUPROFEN 800 MG/1
800 TABLET, FILM COATED ORAL EVERY 6 HOURS PRN
COMMUNITY
Start: 2025-06-09

## 2025-07-17 NOTE — PROGRESS NOTES
The following individual(s) verbally consented to be recorded using ambient AI listening technology and understand that they can each withdraw their consent to this listening technology at any point by asking the clinician to turn off or pause the recording:    Patient name: Felicita Nielson Marek

## 2025-07-17 NOTE — PROGRESS NOTES
REASON FOR VISIT:    Felicita Jara is a 54 year old female who presents for an Annual Health Assessment.    History of Present Illness  Felicita Jara is a 54 year old female with restless leg syndrome and hyperlipidemia who presents with medication management issues.    She has been experiencing significant issues with medication management, particularly with ropinirole for restless leg syndrome and rosuvastatin for hyperlipidemia. While caring for her mother in [place], she mixed up her medications due to their similar appearance, leading to nausea. Consequently, she stopped both medications about a month ago. The nausea resolved shortly after discontinuation, but her restless leg symptoms have worsened significantly, impacting her sleep.    She has a history of hyperlipidemia and was previously on atorvastatin, which was switched to rosuvastatin due to nausea. She suspects that the nausea might have been caused by the statin, but is unsure. She was taking both medications at night.    She also has a history of depression and anxiety, for which she is taking Lexapro and bupropion. She feels 'super stressed out' but has energy and motivation to do things. No insomnia and states she is sleeping well.Denies feeling depressed, hopeless or sad.  Denies anhedonia and brain fog.  Sleeping well.   Denies SI or HI.      She has experienced significant recent life stressors, including the recent death of her father and her mother's declining health. Her mother, aged 96, is in a nursing home but refuses to move to a higher level of care despite multiple falls. She is an only child and has been managing her mother's care alone, which has been extremely stressful.    She has a history of kidney stones and is concerned about calcium intake. She stopped taking calcium supplements after a large calcium-based kidney stone. She takes vitamin D3 and is considering a multivitamin due to memory concerns.    She  reports memory issues, which have been worsening, and is concerned about early-onset Alzheimer's. She has noticed difficulty with tasks such as writing papers and others have noticed her memory issues as well.    She has a history of cold sores, which occur rarely and are managed with medication. She also reports hair thinning, which she attributes to stress and possibly dental issues, as she has a broken tooth.    , monogamous    menses: Menopause age 47-denies vaginal bleeding.  Hot flashes controlled  Last pap: 2/15/2024-normal, negative HPV.    History of abnormal pap: Denies  On vit D daily -1000IU daily   MVI- no  Calcium-not monitoring, some dairy  Colonoscopy-12/10/2021-recall in 10 years-hyperplastic polyp-performed by Dr. Prieto,\                          2014 3-4 polyps-[facility]  DEXA: 2024-osteopenia in the femoral neck with a T-score of -2.0-other parameters normal  Mammogram: 2024 BI-RADS 2  Exercise-none   Diet- healthy keto lean proteins, fish 2x weekly, chicken, rare red meat  Dentist regularly- yes  Annual eye exam-yes  Etoh: Abstinent from alcohol  Cigs: former smoker quit 2017-15-pack-year history  Illicits: Denies, no marijuana  Immunizations: Needs COVID-19 booster, Shingrix, consider hepatitis A and B  FH significant: Adopted    Patient Active Problem List   Diagnosis    Bilateral carpal tunnel syndrome    Hand arthritis    BMI 25.0-25.9,adult    Snoring    Restless leg syndrome    Short-term memory loss    History of alcohol abuse    Recurrent cold sores    History of adenomatous polyp of colon    Benign neoplasm of transverse colon    Benign neoplasm of sigmoid colon    Anxiety and depression    Chronic pain of both knees    Acute dysfunction of left eustachian tube    Memory loss    Mixed hyperlipidemia    Thyroid nodule    Vitamin D deficiency    Adhesive capsulitis of left shoulder    Osteopenia of neck of femur    Intramural leiomyoma of  uterus    Gross hematuria    Migraine with aura and without status migrainosus, not intractable    Nephrolithiasis     Current Outpatient Medications   Medication Sig Dispense Refill    amoxicillin 500 MG Oral Cap Take 1 capsule (500 mg total) by mouth every 8 (eight) hours.      dexamethasone 2 MG Oral Tab       ibuprofen 800 MG Oral Tab Take 1 tablet (800 mg total) by mouth every 6 (six) hours as needed.      buPROPion  MG Oral Tablet 24 Hr Take 1 tablet (300 mg total) by mouth daily. 90 tablet 0    escitalopram 20 MG Oral Tab Take 1 tablet (20 mg total) by mouth daily. 90 tablet 0    topiramate 100 MG Oral Tab Take 1 tablet (100 mg total) by mouth daily. 30 tablet 0    Ketorolac Tromethamine 10 MG Oral Tab Take 1 tablet (10 mg total) by mouth every 6 (six) hours as needed for Pain. 10 tablet 0    ondansetron 4 MG Oral Tablet Dispersible Take 1 tablet (4 mg total) by mouth every 8 (eight) hours as needed for Nausea.      Calcium Carbonate-Vit D-Min (CALCIUM-VITAMIN D-MINERALS) 600-800 MG-UNIT Oral Chew Tab Chew 1 tablet by mouth in the morning and 1 tablet before bedtime. 180 tablet 3    rosuvastatin 20 MG Oral Tab Take 1 tablet (20 mg total) by mouth nightly. **Appointment needed for further refills. 30 tablet 0    rOPINIRole 2 MG Oral Tab Take 1 tablet (2 mg total) by mouth nightly. **Appointment needed for further refills. 30 tablet 0    aspirin 81 MG Oral Tab EC Take 1 tablet (81 mg total) by mouth daily. For foot fracture 90 tablet     valACYclovir 1 G Oral Tab Take 2 tablets (2,000 mg total) by mouth every 12 (twelve) hours. (Patient taking differently: Take 2 tablets (2,000 mg total) by mouth every 12 (twelve) hours. As needed) 4 tablet 3     Wt Readings from Last 6 Encounters:   07/17/25 149 lb (67.6 kg)   03/06/25 151 lb (68.5 kg)   01/08/25 160 lb (72.6 kg)   10/26/24 165 lb (74.8 kg)   10/09/24 157 lb 9.6 oz (71.5 kg)   05/09/24 157 lb 9.6 oz (71.5 kg)     Body mass index is 22.66 kg/m².    No  results found for: \"GLUCOSE\"  Lab Results   Component Value Date    CHOLEST 117 02/24/2025    CHOLEST 144 12/03/2021    CHOLEST 145 04/08/2021     Lab Results   Component Value Date    HDL 42 02/24/2025    HDL 48 12/03/2021    HDL 49 04/08/2021     No results found for: \"TRIGLY\"  Lab Results   Component Value Date    LDL 60 02/24/2025    LDL 84 12/03/2021    LDL 83 04/08/2021     Lab Results   Component Value Date    AST 25 02/24/2025    AST 22 12/03/2021    AST 24 02/04/2021     Lab Results   Component Value Date    ALT 25 02/24/2025    ALT 40 12/03/2021    ALT 30 02/04/2021     Lab Results   Component Value Date    TSH 2.730 09/17/2020    TSH 1.640 10/10/2019     Lab Results   Component Value Date    BUN 12 02/24/2025    BUN 17 12/03/2021    BUN 16 09/17/2020    CREATSERUM 0.91 02/24/2025    CREATSERUM 0.84 12/03/2021    CREATSERUM 0.67 09/17/2020       General Health     How would you describe your current health state?: Fair    Type of Diet: Balanced    How do you maintain positive mental well-being?: Social Interaction, Visiting Friends    How would you describe your daily physical activity?: Moderate    If you are a male age 45-79 or a female age 55-79, do you take aspirin?: Yes    Have you had any immunizations at another office such as Influenza, Hepatitis B, Tetanus, or Pneumococcal?: Yes    At any time do you feel concerned for the safety/well-being of yourself and/or your children, in your home or elsewhere?: No     CAGE:     Cut: Have you ever felt you should Cut down on your drinking?: No    Annoyed: Have people Annoyed you by criticizing your drinking?: No    Guilty: Have you ever felt bad or Guilty about your drinking?: No    Eye Opener: Have you ever had a drink first thing in the morning to steady your nerves or to get rid of a hangover (Eye opener)?: No    Scoring  Total Score: 0     Depression Screening (PHQ-2/PHQ-9): Over the LAST 2 WEEKS   Little interest or pleasure in doing things (over the  last two weeks)?: Not at all    Feeling down, depressed, or hopeless (over the last two weeks)?: Several days    PHQ-2 SCORE: 1   Patient states she does not feel depressed.  Asked specifically.  She has had days she has felt down given the difficult to with her mother's recent health and living in [place] and refuses to move closer to her or live in a higher level of care that is required.  She denied feeling hopeless.  States her mother is narcissist and very difficult so none of this is surprising.  Denies any SI or HI.  She has been fatigued with her restless leg has not been on her medication and sleep has been disturbed     PREVENTATIVE SERVICES  INDICATIONS AND SCHEDULE Recommendation Internal Lab or Procedure External Lab or Procedure   Breast Cancer Screening   Every 2 yrs age 50-74 Health Maintenance   Topic Date Due    Mammogram  02/19/2025       Pap Every 3 yrs age 21-65 or Pap and HPV every 5 yrs age 30-65 Health Maintenance   Topic Date Due    Pap Smear  02/15/2027       Chlamydia Screening Screen Annually age<25, if sex active/on OCPs; >24 high risk No results found for: \"CHLAMYDIA\"    Colonoscopy Screen Every 10 years Health Maintenance   Topic Date Due    Colorectal Cancer Screening  12/10/2031       Flex Sigmoidoscopy Screen  Every 5 years No results found for this or any previous visit.    Fecal Occult Blood  Annually Occult Blood (no units)   Date Value   07/05/2021 Negative       Obesity Screening Screen all adults annually Body mass index is 22.66 kg/m².      Preventive Services for Which Recommendations Vary with Risk Recommendation Internal Lab or Procedure External Lab or Procedure   Cholesterol Screening Recommended screening varies with age, risk and gender LDL Cholesterol (mg/dL)   Date Value   02/24/2025 60       Diabetes Screening  if history of high blood pressure or other  risk factors HgbA1C (%)   Date Value   02/04/2021 5.6     Glucose (mg/dL)   Date Value   02/24/2025 77          Gonorrhea Screening if high risk No results found for: \"GONOCOCCUS\"    HIV Screening For all adults age 18-65, older adults at increased risk No results found for: \"HIV\"    Syphilis Screening Screen if pregnant or high risk No results found for: \"RPR\"    Hepatitis C Screening Screen those at high risk plus screen one time for adults born 1947-1 965 No results found for: \"HCVAB\"    Tuberculosis Screen if high risk No components found for: \"PPDINDURAT\"      Disease Monitoring:    SPECIFIC DISEASE MONITORING Internal Lab or Procedure External Lab or Procedure   Annual Monitoring of Persistent     Medications (ACE/ARB, digoxin, diuretics)    Potassium  Annually Potassium (mmol/L)   Date Value   02/24/2025 4.1         No data to display                Creatinine  Annually Creatinine (mg/dL)   Date Value   02/24/2025 0.91         No data to display                Digoxin Serum Conc  Annually No results found for: \"DIGOXIN\"      No data to display                Diabetes      HgbA1C  Annually HgbA1C (%)   Date Value   02/04/2021 5.6         No data to display                Creat/alb ratio  Annually Creatinine (mg/dL)   Date Value   02/24/2025 0.91        LDL  Annually LDL Cholesterol (mg/dL)   Date Value   02/24/2025 60         No data to display                 Dilated Eye exam  Annually      No data to display                   No data to display                Asthma  (Annually between Nov. 1 & Dec. 31)    Date of last AAP/ACT and counseling given on importance of controller meds.                 ALLERGIES:     Allergies   Allergen Reactions    Niacin FACE FLUSHING    Gluten Meal DIARRHEA     Gluten sensitivity        CURRENT MEDICATIONS:   Current Outpatient Medications   Medication Sig Dispense Refill    amoxicillin 500 MG Oral Cap Take 1 capsule (500 mg total) by mouth every 8 (eight) hours.      dexamethasone 2 MG Oral Tab       ibuprofen 800 MG Oral Tab Take 1 tablet (800 mg total) by mouth every 6 (six) hours  as needed.      buPROPion  MG Oral Tablet 24 Hr Take 1 tablet (300 mg total) by mouth daily. 90 tablet 0    escitalopram 20 MG Oral Tab Take 1 tablet (20 mg total) by mouth daily. 90 tablet 0    topiramate 100 MG Oral Tab Take 1 tablet (100 mg total) by mouth daily. 30 tablet 0    Ketorolac Tromethamine 10 MG Oral Tab Take 1 tablet (10 mg total) by mouth every 6 (six) hours as needed for Pain. 10 tablet 0    ondansetron 4 MG Oral Tablet Dispersible Take 1 tablet (4 mg total) by mouth every 8 (eight) hours as needed for Nausea.      Calcium Carbonate-Vit D-Min (CALCIUM-VITAMIN D-MINERALS) 600-800 MG-UNIT Oral Chew Tab Chew 1 tablet by mouth in the morning and 1 tablet before bedtime. 180 tablet 3    rosuvastatin 20 MG Oral Tab Take 1 tablet (20 mg total) by mouth nightly. **Appointment needed for further refills. 30 tablet 0    rOPINIRole 2 MG Oral Tab Take 1 tablet (2 mg total) by mouth nightly. **Appointment needed for further refills. 30 tablet 0    aspirin 81 MG Oral Tab EC Take 1 tablet (81 mg total) by mouth daily. For foot fracture 90 tablet     valACYclovir 1 G Oral Tab Take 2 tablets (2,000 mg total) by mouth every 12 (twelve) hours. (Patient taking differently: Take 2 tablets (2,000 mg total) by mouth every 12 (twelve) hours. As needed) 4 tablet 3      MEDICAL INFORMATION:   Past Medical History:    Abdominal distention    Alcoholism (HCC)    Allergic rhinitis    Sinus headaches    Anxiety    Arthritis    Bloating    Calculus of kidney    Colon polyp    Decorative tattoo    Depression    Diarrhea, unspecified    Essential hypertension    Hemorrhoids    High cholesterol    Hx of motion sickness    IBS (irritable bowel syndrome)    Irregular bowel habits    Migraines    Nausea    Obesity    Im working on it    Osteoarthritis    Stress    Trigger finger    Visual impairment    glasses    Weight gain      Past Surgical History:   Procedure Laterality Date    Colonoscopy  01/01/2014    Colonoscopy  Tummy  problems    Getting better    Hand/finger surgery unlisted      Other surgical history  Finger-pinky. Lipoma removed      Family History   Adopted: Yes   Problem Relation Age of Onset    Thyroid Disorder Mother     Thyroid disease Mother     Other (gallbladder removal) Mother     Other (TIA) Mother     Thyroid Disorder Maternal Grandmother     Brain Cancer Maternal Grandmother     Breast Cancer Maternal Grandmother     Other (smoker) Maternal Grandmother     Heart Disease Maternal Grandfather     Brain Cancer Maternal Aunt     Cancer Maternal Aunt         lung    Other (smoker) Maternal Aunt     Cancer Maternal Aunt     No Known Problems Maternal Aunt       SOCIAL HISTORY:   Social History     Socioeconomic History    Marital status:    Tobacco Use    Smoking status: Former     Current packs/day: 0.00     Average packs/day: 0.3 packs/day for 15.0 years (3.8 ttl pk-yrs)     Types: Cigarettes     Start date: 2002     Quit date: 2017     Years since quittin.9    Smokeless tobacco: Never   Vaping Use    Vaping status: Former    Substances: Nicotine, THC   Substance and Sexual Activity    Alcohol use: Not Currently     Alcohol/week: 0.0 standard drinks of alcohol     Comment: Quit 2019    Drug use: Not Currently     Types: Cannabis   Other Topics Concern    Caffeine Concern Yes     Comment: Trying to quit drinking diet coke    Stress Concern No    Weight Concern Yes     Comment: Hard time losing weight    Special Diet Yes     Comment: Not doing well on Mediterranean diet    Exercise No    Seat Belt No     Social Drivers of Health     Food Insecurity: No Food Insecurity (2025)    NCSS - Food Insecurity     Worried About Running Out of Food in the Last Year: No     Ran Out of Food in the Last Year: No   Transportation Needs: No Transportation Needs (2025)    NCSS - Transportation     Lack of Transportation: No   Housing Stability: Not At Risk (2025)    NCSS -  Housing/Utilities     Has Housing: Yes     Worried About Losing Housing: No     Unable to Get Utilities: No     Occ:   : yes       REVIEW OF SYSTEMS:   GENERAL: feels well otherwise  SKIN: denies any unusual skin lesions  EYES: denies blurred vision or double vision  HEENT: denies nasal congestion, sinus pain or ST  LUNGS: denies shortness of breath with exertion  CARDIOVASCULAR: denies chest pain on exertion  GI: denies abdominal pain, denies heartburn  : denies dysuria, vaginal discharge or itching.  menopausal, denies vaginal bleeding  MUSCULOSKELETAL: denies back pain  NEURO: Brain fog denies headaches  PSYCHE: Insomnia, fatigue denies depression or anxiety  HEMATOLOGIC: denies hx of anemia  ENDOCRINE: Vasomotor symptoms hot then cold denies thyroid history  ALL/ASTHMA: denies hx of allergy or asthma    EXAM:   /82 (BP Location: Right arm, Patient Position: Sitting, Cuff Size: adult)   Pulse 73   Temp 97.3 °F (36.3 °C) (Temporal)   Resp 18   Ht 5' 8\" (1.727 m)   Wt 149 lb (67.6 kg)   LMP  (LMP Unknown)   SpO2 97%   BMI 22.66 kg/m²    No LMP recorded (lmp unknown). Patient is postmenopausal.   GENERAL: well developed, well nourished, in no apparent distress  SKIN: no rashes, no suspicious lesions  HEENT: atraumatic, normocephalic, ears clear bilateral-left TM is intact slightly retracted no redness.  EACs are clear bilateral tragus nontender bilateral  EYES:PERRLA, EOMI, normal optic disk, conjunctiva are clear  NECK: supple, no adenopathy, no bruits  CHEST: no chest tenderness  BREAST: no dominant or suspicious mass bilateral, axilla clear bilateral  LUNGS: clear to auscultation  CARDIO: RRR without murmur  GI: good BS's, no masses, HSM or tenderness  : Deferred-no concerns no vaginal bleeding  RECTAL: Deferred  MUSCULOSKELETAL: back is not tender, FROM of the back  EXTREMITIES: no cyanosis, clubbing or edema  NEURO: Oriented times three, cranial nerves are intact, motor  and sensory are grossly intact  Psych: Slightly flat affect, very friendly, appropriate answers to questions.  Good insight.  Well-groomed, normal speech, denies SI or HI  ASSESSMENT AND OTHER RELEVANT CHRONIC CONDITIONS:   Felicita Jara is a 54 year old female who presents for an Annual Health Assessment.     PLAN SUMMARY:   1. Annual physical exam  -Encourage Mediterranean diet  -Encouraged exercise 30 minutes to 60 minutes daily x 3-5x weekly for 150 minutes or more to prevent obesity and chronic disease and eliminate stress and its effect on the body.  -encouraged to continue not smoking  -safe sex practices - recommend condom use  -mammogram order placed- if age 40y or older, recommend annual  -self breast exams encouraged monthly  -immunizations- needs flu shot, Shingrix, consider Twinrix hepatitis A and B recommend annual influenza shot in fall  -Vitamin D3  2000 units daily recommended  -Needs 1000 mg of calcium daily for osteoporosis prevention discussed  -thin prep pap recommended every 3 years if normal  - [facility] LUAN 2D+3D SCREENING BILAT (CPT=77067/64753); Future  - CBC With Differential With Platelet; Future  - Comp Metabolic Panel; Future  - Lipid Panel; Future  - TSH W Reflex To Free T4; Future  - CBC With Differential With Platelet  - Comp Metabolic Panel  - Lipid Panel  - TSH W Reflex To Free T4    2. Side effect of medication  - ondansetron 8 MG Oral Tablet Dispersible; Take 1 tablet (8 mg total) by mouth every 8 (eight) hours as needed for Nausea.  Dispense: 10 tablet; Refill: 0  Suspect due to repair and all patient may have been missing doses and stopping and starting with travel  Will give ondansetron to help with restarting medications individually to determine which was causing the nausea    3. Restless leg syndrome  - Restart rOPINIRole 0.5 MG Oral Tab; Take 1 tablet (0.5 mg total) by mouth nightly for 7 days, THEN 2 tablets (1 mg total) nightly for 7 days, THEN 4 tablets (2 mg total)  nightly for 16 days. **Appointment needed for further refills.  Dispense: 85 tablet; Refill: 0  Patient may stay at 1 mg of droperidol if effective controlling her restless leg and not increase to 2 mg  Reevaluate in 1 month    4. Mixed hyperlipidemia  - Lipid Panel; Future  - Lipid Panel  Hold on rosuvastatin since patient is restarting her medications and would like to evaluate which medication is truly causing nausea.  Patient will start ropinirole and all since restless leg is affecting possibly her memory loss with poor quality sleep and this can affect her mood and is under a lot of stress with losing her father who  in the past year then her mother's worsening illness.  - At follow-up visit in 1 month we will restart rosuvastatin after starting repair and I will    5. Memory loss  - Neuro Referral - In Network  - Refer neuropsych  Complete neuropsych testing and schedule follow-up with neurologist    6. Anxiety and depression  Continue bupropion and escitalopram  Patient with anxiety has been worse but is dealing with a lot of family stressors and poor quality sleep due to uncontrolled restless leg.  Has no difficulty at times falling asleep but can wake up throughout the night due to leg movement.  -contracts for safety- if suicidal or homicidal, call 911 or go to nearest ER and call office  -increased suicide risk on SSRI or SNRI   Exercise 3 x weekly x 30-60min  Reevaluate mood and anxiety in 1 month    7. Recurrent cold sores  - valACYclovir 1 G Oral Tab; Take 2 tablets (2,000 mg total) by mouth every 12 (twelve) hours. As needed  Dispense: 4 tablet; Refill: 5  Controlled    8. Nephrolithiasis  Drink 60 to 80 ounces of water daily    9. Osteopenia of neck of femur, unspecified laterality  Encouraged starting multivitamin for vitamin D and may help with memory according to research and age 60 above-Take after dinner  Weightbearing exercises 30 minutes 3 times a week recommended  Calcium rich diet avoid  calcium supplements due to kidney stones  DEXA scan every 2 years-due 1/27/2026    10. Screening for endocrine, nutritional, metabolic and immunity disorder  - CBC With Differential With Platelet; Future  - Comp Metabolic Panel; Future  - TSH W Reflex To Free T4; Future  - CBC With Differential With Platelet  - Comp Metabolic Panel  - TSH W Reflex To Free T4    11. Encounter for screening mammogram for malignant neoplasm of breast  - [facility] LUAN 2D+3D SCREENING BILAT (CPT=77067/68138); Future    12.  BMI 22.0-22.9, adult-normal    The patient indicates understanding of these issues and agrees to the plan.  Return in about 1 month (around 8/17/2025) for medication monitoring-may be in person or virtual.    Diet counseling perfomed  Exercise counseling perfomed  Endometrial cancer awareness counseling performed    SUGGESTED VACCINATIONS - Influenza, Pneumococcal, Zoster, Tetanus     Immunization History   Administered Date(s) Administered    Covid-19 Vaccine Moderna 100 mcg/0.5 ml 04/05/2021, 05/03/2021, 01/07/2022    FLULAVAL 6 months & older 0.5 ml Prefilled syringe (87999) 10/10/2019, 09/17/2020    FLUZONE 6 months and older PFS 0.5 ml (22002) 11/17/2016, 10/10/2019, 09/17/2020, 09/30/2021, 02/15/2024    Fluvirin, 3 Years & >, Im 10/03/2013, 10/01/2014    Fluzone Vaccine Medicare () 11/22/2011    Influenza 01/19/2018, 09/21/2022    Influenza Vaccine, trivalent (IIV3), PF 0.5mL ([id number]) 10/04/2024    Pneumovax 23 09/17/2020    TDAP 11/22/2011, 10/26/2024    Td 06/21/2018       Influenza Annually   Pneumococcal if high risk   Td/Tdap once then every 10 years   HPV Females 11-26: 3 doses   Zoster (Shingles) 60 and older: one dose   Varicella 2 doses if not immune   MMR 1-2 doses if born after 1958 and not immune

## 2025-07-17 NOTE — PROGRESS NOTES
Patient came in for draw of ordered fasting labs. Patient drawn out of right arm  AC, x 1 attempt and tolerated well.  1 sst ( GREEN) 1 lavender  tube drawn.

## 2025-07-18 ENCOUNTER — TELEPHONE (OUTPATIENT)
Dept: CASE MANAGEMENT | Age: 55
End: 2025-07-18

## 2025-07-18 DIAGNOSIS — R41.3 MEMORY LOSS: Primary | ICD-10-CM

## 2025-07-18 LAB
ALBUMIN SERPL-MCNC: 4.4 G/DL (ref 3.2–4.8)
ALBUMIN/GLOB SERPL: 1.6 {RATIO} (ref 1–2)
ALP LIVER SERPL-CCNC: 96 U/L (ref 46–118)
ALT SERPL-CCNC: 22 U/L (ref 10–49)
ANION GAP SERPL CALC-SCNC: 7 MMOL/L (ref 0–18)
AST SERPL-CCNC: 27 U/L (ref ?–34)
BILIRUB SERPL-MCNC: 0.4 MG/DL (ref 0.3–1.2)
BUN BLD-MCNC: 9 MG/DL (ref 9–23)
CALCIUM BLD-MCNC: 9.5 MG/DL (ref 8.7–10.6)
CHLORIDE SERPL-SCNC: 108 MMOL/L (ref 98–112)
CHOLEST SERPL-MCNC: 202 MG/DL (ref ?–200)
CO2 SERPL-SCNC: 27 MMOL/L (ref 21–32)
CREAT BLD-MCNC: 0.95 MG/DL (ref 0.55–1.02)
EGFRCR SERPLBLD CKD-EPI 2021: 70 ML/MIN/1.73M2 (ref 60–?)
FASTING PATIENT LIPID ANSWER: YES
FASTING STATUS PATIENT QL REPORTED: YES
GLOBULIN PLAS-MCNC: 2.7 G/DL (ref 2–3.5)
GLUCOSE BLD-MCNC: 82 MG/DL (ref 70–99)
HDLC SERPL-MCNC: 50 MG/DL (ref 40–59)
LDLC SERPL CALC-MCNC: 132 MG/DL (ref ?–100)
NONHDLC SERPL-MCNC: 152 MG/DL (ref ?–130)
OSMOLALITY SERPL CALC.SUM OF ELEC: 292 MOSM/KG (ref 275–295)
POTASSIUM SERPL-SCNC: 4 MMOL/L (ref 3.5–5.1)
PROT SERPL-MCNC: 7.1 G/DL (ref 5.7–8.2)
SODIUM SERPL-SCNC: 142 MMOL/L (ref 136–145)
TRIGL SERPL-MCNC: 114 MG/DL (ref 30–149)
TSI SER-ACNC: 3.46 UIU/ML (ref 0.55–4.78)
VLDLC SERPL CALC-MCNC: 21 MG/DL (ref 0–30)

## 2025-07-25 ENCOUNTER — HOSPITAL ENCOUNTER (OUTPATIENT)
Dept: ULTRASOUND IMAGING | Facility: HOSPITAL | Age: 55
Discharge: HOME | End: 2025-07-25
Attending: UROLOGY
Payer: COMMERCIAL

## 2025-07-25 DIAGNOSIS — N20.0 NEPHROLITHIASIS: ICD-10-CM

## 2025-07-25 PROCEDURE — 76770 US EXAM ABDO BACK WALL COMP: CPT | Performed by: UROLOGY

## 2025-07-30 ENCOUNTER — TELEPHONE (OUTPATIENT)
Dept: SURGERY | Facility: CLINIC | Age: 55
End: 2025-07-30

## 2025-07-30 ENCOUNTER — OFFICE VISIT (OUTPATIENT)
Dept: SURGERY | Facility: CLINIC | Age: 55
End: 2025-07-30
Payer: COMMERCIAL

## 2025-07-30 DIAGNOSIS — N20.0 NEPHROLITHIASIS: Primary | ICD-10-CM

## 2025-07-30 PROCEDURE — G2211 COMPLEX E/M VISIT ADD ON: HCPCS | Performed by: UROLOGY

## 2025-07-30 PROCEDURE — 99213 OFFICE O/P EST LOW 20 MIN: CPT | Performed by: UROLOGY

## 2025-08-16 ENCOUNTER — HOSPITAL ENCOUNTER (OUTPATIENT)
Dept: MAMMOGRAPHY | Facility: HOSPITAL | Age: 55
Discharge: HOME | End: 2025-08-16
Attending: FAMILY MEDICINE

## 2025-08-16 DIAGNOSIS — Z00.00 ANNUAL PHYSICAL EXAM: ICD-10-CM

## 2025-08-16 DIAGNOSIS — Z12.31 ENCOUNTER FOR SCREENING MAMMOGRAM FOR MALIGNANT NEOPLASM OF BREAST: ICD-10-CM

## 2025-08-16 PROCEDURE — 77067 SCR MAMMO BI INCL CAD: CPT | Performed by: FAMILY MEDICINE

## 2025-08-16 PROCEDURE — 77063 BREAST TOMOSYNTHESIS BI: CPT | Performed by: FAMILY MEDICINE

## (undated) DIAGNOSIS — E78.00 PURE HYPERCHOLESTEROLEMIA: ICD-10-CM

## (undated) DEVICE — CATHETER URET 5FR L70CM FLX OPN TIP NONPORTED

## (undated) DEVICE — ENDOSCOPIC VALVE WITH ADAPTER.: Brand: SURSEAL® II

## (undated) DEVICE — SYRINGE MED 10ML LL TIP W/O SFTY DISP

## (undated) DEVICE — SLEEVE COMPR MD KNEE LEN SGL USE KENDALL SCD

## (undated) DEVICE — MINI LAP PACK-LF: Brand: MEDLINE INDUSTRIES, INC.

## (undated) DEVICE — KENDALL SCD EXPRESS SLEEVES, KNEE LENGTH, MEDIUM: Brand: KENDALL SCD

## (undated) DEVICE — GAUZE SPONGES,USP TYPE VII GAUZE, 12 PLY: Brand: CURITY

## (undated) DEVICE — SOLUTION IRRIG 3000ML 0.9% NACL FLX CONT

## (undated) DEVICE — FIBER LSR 200UM 2J 80HZ 60W DL FOR LITHO

## (undated) DEVICE — URETERAL ACCESS SHEATH SET: Brand: NAVIGATOR HD

## (undated) DEVICE — SUTURE VICRYL 3-0 SH

## (undated) DEVICE — 3M™ STERI-STRIP™ REINFORCED ADHESIVE SKIN CLOSURES, R1547, 1/2 IN X 4 IN (12 MM X 100 MM), 6 STRIPS/ENVELOPE: Brand: 3M™ STERI-STRIP™

## (undated) DEVICE — JELLY,LUBE,STERILE,FLIP TOP,TUBE,2-OZ: Brand: MEDLINE

## (undated) DEVICE — SOLUTION IRRIG 1000ML 0.9% NACL USP BTL

## (undated) DEVICE — NITINOL WIRE WITH HYDROPHILIC TIP: Brand: SENSOR

## (undated) DEVICE — 3M™ TEGADERM™ HP TRANSPARENT FILM DRESSING FRAME STYLE, 9534HP, 2-3/8 X 2-3/4 IN (6 CM X 7 CM), 100/CT 4CT/CASE: Brand: 3M™ TEGADERM™

## (undated) DEVICE — 3M(TM) MICROPORE TAPE DISPENSER 1535-2: Brand: 3M™ MICROPORE™

## (undated) DEVICE — NITINOL STONE RETRIEVAL BASKET: Brand: ZERO TIP

## (undated) DEVICE — UNDYED BRAIDED (POLYGLACTIN 910), SYNTHETIC ABSORBABLE SUTURE: Brand: COATED VICRYL

## (undated) DEVICE — SOL  .9 1000ML BTL

## (undated) DEVICE — GAMMEX® PI HYBRID SIZE 6, STERILE POWDER-FREE SURGICAL GLOVE, POLYISOPRENE AND NEOPRENE BLEND: Brand: GAMMEX

## (undated) DEVICE — GAMMEX® PI HYBRID SIZE 8, STERILE POWDER-FREE SURGICAL GLOVE, POLYISOPRENE AND NEOPRENE BLEND: Brand: GAMMEX

## (undated) DEVICE — CATHETER URET 5FRX28IN CONE TIP POLYUR DISP

## (undated) DEVICE — CHLORAPREP 26ML APPLICATOR

## (undated) DEVICE — PACK CUSTOM CYSTO

## (undated) DEVICE — ADHESIVE LIQ 2/3ML VI MASTISOL

## (undated) DEVICE — TOWEL OR BLU 16X26 STRL

## (undated) DEVICE — SNAP KOVER: Brand: UNBRANDED

## (undated) DEVICE — TUR Y CYSTO TUBING SET IRRIG L96IN

## (undated) DEVICE — STERILE SYNTHETIC POLYISOPRENE POWDER-FREE SURGICAL GLOVES WITH HYDROGEL COATING: Brand: PROTEXIS

## (undated) DEVICE — HYDROGEL COATED URETERAL DILATOR: Brand: NOTTINGHAM ONE-STEP

## (undated) DEVICE — SOLUTION IRRIG 1000ML ST H2O AQUALITE PLAS

## (undated) DEVICE — UROLOGY DRAIN BAG

## (undated) NOTE — LETTER
12/10/20        [address] [id number]      Dear Melissa Grewal,    [facility] records indicate that you have outstanding lab work and or testing that was ordered for you and has not yet been completed:        [facility]

## (undated) NOTE — Clinical Note
Jarrett Key, please enter cage and PHQ 2's/9-I know was done at the physical-because we talked about it prior to me entering the room. Also into general health questions. I could not find any of these completed or entered in the flowsheets.   I will need to

## (undated) NOTE — LETTER
10/16/20    Patient: Salma Nguyen  : 1970 Visit date: 10/15/2020    Dear  Miko Galvna, DO    Thank you for referring Salma Nguyen to my practice. Please find my assessment and plan below.        History of Present Illness     5